# Patient Record
Sex: MALE | Race: WHITE | Employment: OTHER | URBAN - METROPOLITAN AREA
[De-identification: names, ages, dates, MRNs, and addresses within clinical notes are randomized per-mention and may not be internally consistent; named-entity substitution may affect disease eponyms.]

---

## 2018-02-15 ENCOUNTER — HOSPITAL ENCOUNTER (OUTPATIENT)
Age: 68
Discharge: HOME | End: 2018-02-15
Payer: COMMERCIAL

## 2018-02-15 LAB
ALT SERPL-CCNC: 25 U/L (ref 5–41)
AST SERPL-CCNC: 31 U/L
CHOLESTEROL, FASTING: 146 MG/DL
CHOLESTEROL/HDL RATIO: 3.2
HDLC SERPL-MCNC: 46 MG/DL
LDL CHOLESTEROL: 70 MG/DL (ref 0–130)
PROSTATE SPECIFIC ANTIGEN: 1.86 UG/L
TRIGLYCERIDE, FASTING: 150 MG/DL
VLDLC SERPL CALC-MCNC: ABNORMAL MG/DL (ref 1–30)

## 2018-02-15 PROCEDURE — 84450 TRANSFERASE (AST) (SGOT): CPT

## 2018-02-15 PROCEDURE — 84460 ALANINE AMINO (ALT) (SGPT): CPT

## 2018-02-15 PROCEDURE — 84153 ASSAY OF PSA TOTAL: CPT

## 2018-02-15 PROCEDURE — 80061 LIPID PANEL: CPT

## 2018-02-15 PROCEDURE — 36415 COLL VENOUS BLD VENIPUNCTURE: CPT

## 2018-11-12 ENCOUNTER — OFFICE VISIT (OUTPATIENT)
Dept: PRIMARY CARE CLINIC | Age: 68
End: 2018-11-12
Payer: COMMERCIAL

## 2018-11-12 VITALS
BODY MASS INDEX: 38.8 KG/M2 | SYSTOLIC BLOOD PRESSURE: 130 MMHG | RESPIRATION RATE: 20 BRPM | DIASTOLIC BLOOD PRESSURE: 70 MMHG | HEIGHT: 70 IN | HEART RATE: 74 BPM | WEIGHT: 271 LBS | OXYGEN SATURATION: 97 %

## 2018-11-12 DIAGNOSIS — M25.512 CHRONIC LEFT SHOULDER PAIN: Primary | ICD-10-CM

## 2018-11-12 DIAGNOSIS — G89.29 CHRONIC LEFT SHOULDER PAIN: Primary | ICD-10-CM

## 2018-11-12 DIAGNOSIS — I10 ESSENTIAL HYPERTENSION: ICD-10-CM

## 2018-11-12 PROCEDURE — 99213 OFFICE O/P EST LOW 20 MIN: CPT | Performed by: FAMILY MEDICINE

## 2018-11-12 RX ORDER — METHYLPREDNISOLONE 4 MG/1
TABLET ORAL
Qty: 1 KIT | Refills: 0 | Status: SHIPPED | OUTPATIENT
Start: 2018-11-12 | End: 2018-11-18

## 2018-11-12 ASSESSMENT — ENCOUNTER SYMPTOMS
VOMITING: 0
SHORTNESS OF BREATH: 0
CHEST TIGHTNESS: 0
NAUSEA: 0

## 2018-11-12 ASSESSMENT — PATIENT HEALTH QUESTIONNAIRE - PHQ9
SUM OF ALL RESPONSES TO PHQ9 QUESTIONS 1 & 2: 0
2. FEELING DOWN, DEPRESSED OR HOPELESS: 0
SUM OF ALL RESPONSES TO PHQ QUESTIONS 1-9: 0
1. LITTLE INTEREST OR PLEASURE IN DOING THINGS: 0
SUM OF ALL RESPONSES TO PHQ QUESTIONS 1-9: 0

## 2018-11-12 NOTE — PATIENT INSTRUCTIONS
[employer] allows you to send messages to your doctor, view your test results, renew your prescriptions, schedule appointments, view visit notes, and more. How Do I Sign Up? 1. In your Internet browser, go to [email or web address]  2. Click on the Sign Up Now link in the Sign In box. You will see the New Member Sign Up page. 3. Enter your [employer] Access Code exactly as it appears below. You will not need to use this code after youve completed the sign-up process. If you do not sign up before the expiration date, you must request a new code. [employer] Access Code: [email or web address]  Expires: 12/27/2018 10:03 AM    4. Enter your Social Security Number ([id number]) and Date of Birth (mm/dd/yyyy) as indicated and click Submit. You will be taken to the next sign-up page. 5. Create a [employer] ID. This will be your [employer] login ID and cannot be changed, so think of one that is secure and easy to remember. 6. Create a [employer] password. You can change your password at any time. 7. Enter your Password Reset Question and Answer. This can be used at a later time if you forget your password. 8. Enter your e-mail address. You will receive e-mail notification when new information is available in [address] 9. Click Sign Up. You can now view your medical record. Additional Information  If you have questions, please contact the physician practice where you receive care. Remember, [employer] is NOT to be used for urgent needs. For medical emergencies, dial 911. For questions regarding your [employer] account call [phone]. If you have a clinical question, please call your doctor's office.

## 2018-11-12 NOTE — PROGRESS NOTES
and trapezius, some numbness in forearm that is intermittent. Objective:     Physical Exam   Constitutional: He is oriented to person, place, and time. He appears well-developed and well-nourished. HENT:   Head: Normocephalic and atraumatic. Mouth/Throat: No oropharyngeal exudate. Eyes: EOM are normal.   Neck: Normal range of motion. Neck supple. With extension of neck gets some pain in trapezius shoulder region. Cardiovascular: Normal rate, regular rhythm and normal heart sounds. Pulmonary/Chest: Effort normal and breath sounds normal. No respiratory distress. He has no wheezes. Musculoskeletal:   Normal ROM of shoulder, some discomfort with flexion of shoulder. No ttp of shoulder. Negative tinel and phalens sign of left hand/arm,   Neurological: He is alert and oriented to person, place, and time. Skin: Skin is warm and dry. Psychiatric: He has a normal mood and affect. His behavior is normal.     /70   Pulse 74   Resp 20   Ht 5' 10\" (1.778 m)   Wt 271 lb (122.9 kg)   SpO2 97%   BMI 38.88 kg/m²     Assessment:      1. Chronic left shoulder pain  - hx of shoulder pain in past that improved with medrol dose faustino. No pain in neck but pain in shoulder area, but with neck extension sometime gets numbness in his left upper arm and thumb/index finger. Declines imaging at this time. Will try trial of medrol faustino as that has helped resolve it before. Ibuprofen for pain as well. Will check bmp.   - External Referral To Physical Therapy    2. Essential hypertension  - Basic Metabolic Panel;  Future           Plan:      Return in about 3 months (around 2/12/2019) for physical exam.    Orders Placed This Encounter   Procedures    Basic Metabolic Panel     Standing Status:   Future     Standing Expiration Date:   11/12/2019    External Referral To Physical Therapy     Referral Priority:   Routine     Referral Type:   Eval and Treat     Referral Reason:   Specialty Services Required Requested Specialty:   Physical Therapy     Number of Visits Requested:   1     Orders Placed This Encounter   Medications    methylPREDNISolone (MEDROL DOSEPACK) 4 MG tablet     Sig: Take by mouth.      Dispense:  1 kit     Refill:  0            Electronically signed by Sam Hayden DO on 11/12/2018 at 11:22 AM

## 2019-02-05 ENCOUNTER — HOSPITAL ENCOUNTER (OUTPATIENT)
Age: 69
Discharge: HOME | End: 2019-02-05
Payer: COMMERCIAL

## 2019-02-05 LAB
ALBUMIN SERPL-MCNC: 4.1 G/DL (ref 3.5–5.2)
ALBUMIN/GLOBULIN RATIO: 1.4 (ref 1–2.5)
ALP BLD-CCNC: 100 U/L (ref 40–129)
ALT SERPL-CCNC: 18 U/L (ref 5–41)
ANION GAP SERPL CALCULATED.3IONS-SCNC: 11 MMOL/L (ref 9–17)
AST SERPL-CCNC: 19 U/L
BILIRUB SERPL-MCNC: 0.53 MG/DL (ref 0.3–1.2)
BUN BLDV-MCNC: 16 MG/DL (ref 8–23)
BUN/CREAT BLD: ABNORMAL (ref 9–20)
CALCIUM SERPL-MCNC: 8.8 MG/DL (ref 8.6–10.4)
CHLORIDE BLD-SCNC: 104 MMOL/L (ref 98–107)
CHOLESTEROL, FASTING: 128 MG/DL
CHOLESTEROL/HDL RATIO: 3
CO2: 26 MMOL/L (ref 20–31)
CREAT SERPL-MCNC: 0.66 MG/DL (ref 0.7–1.2)
GFR AFRICAN AMERICAN: >60 ML/MIN
GFR NON-AFRICAN AMERICAN: >60 ML/MIN
GFR SERPL CREATININE-BSD FRML MDRD: ABNORMAL ML/MIN/{1.73_M2}
GFR SERPL CREATININE-BSD FRML MDRD: ABNORMAL ML/MIN/{1.73_M2}
GLUCOSE BLD-MCNC: 106 MG/DL (ref 70–99)
HDLC SERPL-MCNC: 42 MG/DL
LDL CHOLESTEROL: 65 MG/DL (ref 0–130)
POTASSIUM SERPL-SCNC: 3.7 MMOL/L (ref 3.7–5.3)
PROSTATE SPECIFIC ANTIGEN: 1.7 UG/L
SODIUM BLD-SCNC: 141 MMOL/L (ref 135–144)
TOTAL PROTEIN: 7.1 G/DL (ref 6.4–8.3)
TRIGLYCERIDE, FASTING: 107 MG/DL
VLDLC SERPL CALC-MCNC: NORMAL MG/DL (ref 1–30)

## 2019-02-05 PROCEDURE — 80053 COMPREHEN METABOLIC PANEL: CPT

## 2019-02-05 PROCEDURE — 84153 ASSAY OF PSA TOTAL: CPT

## 2019-02-05 PROCEDURE — 36415 COLL VENOUS BLD VENIPUNCTURE: CPT

## 2019-02-05 PROCEDURE — 80061 LIPID PANEL: CPT

## 2019-02-11 ENCOUNTER — OFFICE VISIT (OUTPATIENT)
Dept: PRIMARY CARE CLINIC | Age: 69
End: 2019-02-11
Payer: COMMERCIAL

## 2019-02-11 VITALS
OXYGEN SATURATION: 96 % | RESPIRATION RATE: 16 BRPM | DIASTOLIC BLOOD PRESSURE: 74 MMHG | WEIGHT: 261 LBS | SYSTOLIC BLOOD PRESSURE: 126 MMHG | BODY MASS INDEX: 37.45 KG/M2 | HEART RATE: 64 BPM

## 2019-02-11 DIAGNOSIS — Z12.11 ENCOUNTER FOR SCREENING COLONOSCOPY: ICD-10-CM

## 2019-02-11 DIAGNOSIS — Z13.6 ENCOUNTER FOR ABDOMINAL AORTIC ANEURYSM (AAA) SCREENING: ICD-10-CM

## 2019-02-11 DIAGNOSIS — Z00.00 HEALTH MAINTENANCE EXAMINATION: Primary | ICD-10-CM

## 2019-02-11 DIAGNOSIS — Z13.1 SCREENING FOR DIABETES MELLITUS: ICD-10-CM

## 2019-02-11 LAB — HBA1C MFR BLD: 5.4 %

## 2019-02-11 PROCEDURE — 83036 HEMOGLOBIN GLYCOSYLATED A1C: CPT | Performed by: FAMILY MEDICINE

## 2019-02-11 PROCEDURE — 99397 PER PM REEVAL EST PAT 65+ YR: CPT | Performed by: FAMILY MEDICINE

## 2019-02-11 ASSESSMENT — PATIENT HEALTH QUESTIONNAIRE - PHQ9
2. FEELING DOWN, DEPRESSED OR HOPELESS: 0
SUM OF ALL RESPONSES TO PHQ QUESTIONS 1-9: 0
1. LITTLE INTEREST OR PLEASURE IN DOING THINGS: 0
SUM OF ALL RESPONSES TO PHQ QUESTIONS 1-9: 0
SUM OF ALL RESPONSES TO PHQ9 QUESTIONS 1 & 2: 0

## 2019-02-11 ASSESSMENT — ENCOUNTER SYMPTOMS
NAUSEA: 0
SHORTNESS OF BREATH: 0
BLOOD IN STOOL: 0
ABDOMINAL PAIN: 0
SORE THROAT: 0
VOMITING: 0
CONSTIPATION: 0
DIARRHEA: 0
COUGH: 0

## 2019-02-14 ENCOUNTER — TELEPHONE (OUTPATIENT)
Dept: PRIMARY CARE CLINIC | Age: 69
End: 2019-02-14

## 2019-03-07 ENCOUNTER — HOSPITAL ENCOUNTER (OUTPATIENT)
Dept: VASCULAR LAB | Age: 69
Discharge: HOME | End: 2019-03-07
Payer: COMMERCIAL

## 2019-03-07 PROCEDURE — 76706 US ABDL AORTA SCREEN AAA: CPT

## 2019-03-12 ENCOUNTER — ANESTHESIA (OUTPATIENT)
Dept: ENDOSCOPY | Age: 69
End: 2019-03-12
Payer: COMMERCIAL

## 2019-03-12 ENCOUNTER — ANESTHESIA EVENT (OUTPATIENT)
Dept: ENDOSCOPY | Age: 69
End: 2019-03-12
Payer: COMMERCIAL

## 2019-03-12 ENCOUNTER — HOSPITAL ENCOUNTER (OUTPATIENT)
Age: 69
Setting detail: OUTPATIENT SURGERY
Discharge: HOME | End: 2019-03-12
Attending: INTERNAL MEDICINE | Admitting: INTERNAL MEDICINE
Payer: COMMERCIAL

## 2019-03-12 VITALS
TEMPERATURE: 98.1 F | BODY MASS INDEX: 37.22 KG/M2 | OXYGEN SATURATION: 95 % | WEIGHT: 260 LBS | SYSTOLIC BLOOD PRESSURE: 111 MMHG | HEART RATE: 60 BPM | HEIGHT: 70 IN | DIASTOLIC BLOOD PRESSURE: 64 MMHG | RESPIRATION RATE: 14 BRPM

## 2019-03-12 VITALS
OXYGEN SATURATION: 96 % | DIASTOLIC BLOOD PRESSURE: 103 MMHG | RESPIRATION RATE: 17 BRPM | SYSTOLIC BLOOD PRESSURE: 129 MMHG

## 2019-03-12 PROCEDURE — 2720000010 HC SURG SUPPLY STERILE: Performed by: INTERNAL MEDICINE

## 2019-03-12 PROCEDURE — 3609009900 HC COLONOSCOPY W/CONTROL BLEEDING ANY METHOD: Performed by: INTERNAL MEDICINE

## 2019-03-12 PROCEDURE — 2580000003 HC RX 258: Performed by: NURSE ANESTHETIST, CERTIFIED REGISTERED

## 2019-03-12 PROCEDURE — 7100000011 HC PHASE II RECOVERY - ADDTL 15 MIN: Performed by: INTERNAL MEDICINE

## 2019-03-12 PROCEDURE — 7100000010 HC PHASE II RECOVERY - FIRST 15 MIN: Performed by: INTERNAL MEDICINE

## 2019-03-12 PROCEDURE — 2500000003 HC RX 250 WO HCPCS: Performed by: NURSE ANESTHETIST, CERTIFIED REGISTERED

## 2019-03-12 PROCEDURE — 88305 TISSUE EXAM BY PATHOLOGIST: CPT

## 2019-03-12 PROCEDURE — 3700000000 HC ANESTHESIA ATTENDED CARE: Performed by: INTERNAL MEDICINE

## 2019-03-12 PROCEDURE — 3609010400 HC COLONOSCOPY POLYPECTOMY HOT BIOPSY: Performed by: INTERNAL MEDICINE

## 2019-03-12 PROCEDURE — 76937 US GUIDE VASCULAR ACCESS: CPT

## 2019-03-12 PROCEDURE — 6360000002 HC RX W HCPCS: Performed by: NURSE ANESTHETIST, CERTIFIED REGISTERED

## 2019-03-12 PROCEDURE — 2580000003 HC RX 258: Performed by: INTERNAL MEDICINE

## 2019-03-12 PROCEDURE — 2709999900 HC NON-CHARGEABLE SUPPLY: Performed by: INTERNAL MEDICINE

## 2019-03-12 PROCEDURE — 3700000001 HC ADD 15 MINUTES (ANESTHESIA): Performed by: INTERNAL MEDICINE

## 2019-03-12 PROCEDURE — C1773 RET DEV, INSERTABLE: HCPCS | Performed by: INTERNAL MEDICINE

## 2019-03-12 RX ORDER — SODIUM CHLORIDE 9 MG/ML
INJECTION, SOLUTION INTRAVENOUS CONTINUOUS PRN
Status: DISCONTINUED | OUTPATIENT
Start: 2019-03-12 | End: 2019-03-12 | Stop reason: SDUPTHER

## 2019-03-12 RX ORDER — PROPOFOL 10 MG/ML
INJECTION, EMULSION INTRAVENOUS PRN
Status: DISCONTINUED | OUTPATIENT
Start: 2019-03-12 | End: 2019-03-12 | Stop reason: SDUPTHER

## 2019-03-12 RX ORDER — PROPOFOL 10 MG/ML
INJECTION, EMULSION INTRAVENOUS CONTINUOUS PRN
Status: DISCONTINUED | OUTPATIENT
Start: 2019-03-12 | End: 2019-03-12 | Stop reason: SDUPTHER

## 2019-03-12 RX ORDER — LIDOCAINE HYDROCHLORIDE 10 MG/ML
INJECTION, SOLUTION EPIDURAL; INFILTRATION; INTRACAUDAL; PERINEURAL PRN
Status: DISCONTINUED | OUTPATIENT
Start: 2019-03-12 | End: 2019-03-12 | Stop reason: SDUPTHER

## 2019-03-12 RX ORDER — SODIUM CHLORIDE 9 MG/ML
INJECTION, SOLUTION INTRAVENOUS CONTINUOUS
Status: DISCONTINUED | OUTPATIENT
Start: 2019-03-12 | End: 2019-03-12 | Stop reason: HOSPADM

## 2019-03-12 RX ADMIN — SODIUM CHLORIDE: 9 INJECTION, SOLUTION INTRAVENOUS at 08:49

## 2019-03-12 RX ADMIN — PROPOFOL 10 MG: 10 INJECTION, EMULSION INTRAVENOUS at 10:17

## 2019-03-12 RX ADMIN — PROPOFOL 150 MCG/KG/MIN: 10 INJECTION, EMULSION INTRAVENOUS at 09:45

## 2019-03-12 RX ADMIN — PROPOFOL 50 MG: 10 INJECTION, EMULSION INTRAVENOUS at 09:45

## 2019-03-12 RX ADMIN — LIDOCAINE HYDROCHLORIDE 50 MG: 10 INJECTION, SOLUTION EPIDURAL; INFILTRATION; INTRACAUDAL at 09:45

## 2019-03-12 RX ADMIN — PROPOFOL 40 MG: 10 INJECTION, EMULSION INTRAVENOUS at 09:50

## 2019-03-12 RX ADMIN — SODIUM CHLORIDE: 9 INJECTION, SOLUTION INTRAVENOUS at 09:42

## 2019-03-12 ASSESSMENT — PAIN SCALES - GENERAL
PAINLEVEL_OUTOF10: 0
PAINLEVEL_OUTOF10: 0

## 2019-03-12 ASSESSMENT — PAIN - FUNCTIONAL ASSESSMENT: PAIN_FUNCTIONAL_ASSESSMENT: 0-10

## 2019-03-12 ASSESSMENT — LIFESTYLE VARIABLES: SMOKING_STATUS: 0

## 2019-03-13 LAB — SURGICAL PATHOLOGY REPORT: NORMAL

## 2019-09-20 ENCOUNTER — OFFICE VISIT (OUTPATIENT)
Dept: PRIMARY CARE CLINIC | Age: 69
End: 2019-09-20
Payer: COMMERCIAL

## 2019-09-20 VITALS
BODY MASS INDEX: 39.22 KG/M2 | HEIGHT: 69 IN | HEART RATE: 56 BPM | WEIGHT: 264.8 LBS | DIASTOLIC BLOOD PRESSURE: 68 MMHG | SYSTOLIC BLOOD PRESSURE: 138 MMHG | RESPIRATION RATE: 18 BRPM

## 2019-09-20 DIAGNOSIS — Z00.00 ROUTINE GENERAL MEDICAL EXAMINATION AT A HEALTH CARE FACILITY: Primary | ICD-10-CM

## 2019-09-20 PROCEDURE — 3017F COLORECTAL CA SCREEN DOC REV: CPT | Performed by: FAMILY MEDICINE

## 2019-09-20 PROCEDURE — G0439 PPPS, SUBSEQ VISIT: HCPCS | Performed by: FAMILY MEDICINE

## 2019-09-20 PROCEDURE — 4040F PNEUMOC VAC/ADMIN/RCVD: CPT | Performed by: FAMILY MEDICINE

## 2019-09-20 PROCEDURE — 1123F ACP DISCUSS/DSCN MKR DOCD: CPT | Performed by: FAMILY MEDICINE

## 2019-09-20 ASSESSMENT — LIFESTYLE VARIABLES
HOW OFTEN DURING THE LAST YEAR HAVE YOU BEEN UNABLE TO REMEMBER WHAT HAPPENED THE NIGHT BEFORE BECAUSE YOU HAD BEEN DRINKING: 0
HOW OFTEN DURING THE LAST YEAR HAVE YOU HAD A FEELING OF GUILT OR REMORSE AFTER DRINKING: 0
HOW OFTEN DURING THE LAST YEAR HAVE YOU NEEDED AN ALCOHOLIC DRINK FIRST THING IN THE MORNING TO GET YOURSELF GOING AFTER A NIGHT OF HEAVY DRINKING: 0
HOW MANY STANDARD DRINKS CONTAINING ALCOHOL DO YOU HAVE ON A TYPICAL DAY: 1
AUDIT-C TOTAL SCORE: 5
AUDIT TOTAL SCORE: 5
HOW OFTEN DO YOU HAVE SIX OR MORE DRINKS ON ONE OCCASION: 0
HOW OFTEN DO YOU HAVE A DRINK CONTAINING ALCOHOL: 4
HOW OFTEN DURING THE LAST YEAR HAVE YOU FOUND THAT YOU WERE NOT ABLE TO STOP DRINKING ONCE YOU HAD STARTED: 0
HAVE YOU OR SOMEONE ELSE BEEN INJURED AS A RESULT OF YOUR DRINKING: 0
HOW OFTEN DURING THE LAST YEAR HAVE YOU FAILED TO DO WHAT WAS NORMALLY EXPECTED FROM YOU BECAUSE OF DRINKING: 0
HAS A RELATIVE, FRIEND, DOCTOR, OR ANOTHER HEALTH PROFESSIONAL EXPRESSED CONCERN ABOUT YOUR DRINKING OR SUGGESTED YOU CUT DOWN: 0

## 2019-09-20 ASSESSMENT — PATIENT HEALTH QUESTIONNAIRE - PHQ9
SUM OF ALL RESPONSES TO PHQ QUESTIONS 1-9: 0
SUM OF ALL RESPONSES TO PHQ QUESTIONS 1-9: 0

## 2019-09-20 NOTE — PROGRESS NOTES
performed by Yesenia Willard MD at [facility] Endoscopy    TOTAL KNEE ARTHROPLASTY Bilateral      Family History   Problem Relation Age of Onset    Heart Disease Father     Heart Disease Sister     Heart Disease Brother     Heart Disease Sister     Heart Disease Sister        CareTeam (Including outside providers/suppliers regularly involved in providing care):   Patient Care Team:  Abeba Crews DO as PCP - General (Family Medicine)  Abeba Crews DO as PCP - [facility] Empaneled Provider    Wt Readings from Last 3 Encounters:   09/20/19 264 lb 12.8 oz (120.1 kg)   03/12/19 260 lb (117.9 kg)   02/11/19 261 lb (118.4 kg)     Vitals:    09/20/19 1046   Resp: 18   Weight: 264 lb 12.8 oz (120.1 kg)   Height: 5' 8.5\" (1.74 m)     Body mass index is 39.68 kg/m². Based upon direct observation of the patient, evaluation of cognition reveals recent and remote memory intact. Patient's complete Health Risk Assessment and screening values have been reviewed and are found in Flowsheets. The following problems were reviewed today and where indicated follow up appointments were made and/or referrals ordered. Positive Risk Factor Screenings with Interventions:     Health Habits/Nutrition:  Health Habits/Nutrition  Do you exercise for at least 20 minutes 2-3 times per week?: (!) No  Have you lost any weight without trying in the past 3 months?: No  Do you eat fewer than 2 meals per day?: No  Have you seen a dentist within the past year?: Yes  Body mass index is 39.68 kg/m².   Health Habits/Nutrition Interventions:  · plays golf, walks dog, knows he should exercise more    Hearing/Vision:  No exam data present  Hearing/Vision  Do you or your family notice any trouble with your hearing?: No  Do you have difficulty driving, watching TV, or doing any of your daily activities because of your eyesight?: No  Have you had an eye exam within the past year?: (!) No  Hearing/Vision Interventions:  · has an eye doctor, counseled

## 2020-02-03 ENCOUNTER — OFFICE VISIT (OUTPATIENT)
Dept: PRIMARY CARE CLINIC | Age: 70
End: 2020-02-03
Payer: COMMERCIAL

## 2020-02-03 VITALS
DIASTOLIC BLOOD PRESSURE: 74 MMHG | WEIGHT: 267 LBS | SYSTOLIC BLOOD PRESSURE: 132 MMHG | BODY MASS INDEX: 39.55 KG/M2 | RESPIRATION RATE: 18 BRPM | OXYGEN SATURATION: 95 % | HEART RATE: 73 BPM | HEIGHT: 69 IN

## 2020-02-03 PROCEDURE — G8427 DOCREV CUR MEDS BY ELIG CLIN: HCPCS | Performed by: FAMILY MEDICINE

## 2020-02-03 PROCEDURE — 1123F ACP DISCUSS/DSCN MKR DOCD: CPT | Performed by: FAMILY MEDICINE

## 2020-02-03 PROCEDURE — G8417 CALC BMI ABV UP PARAM F/U: HCPCS | Performed by: FAMILY MEDICINE

## 2020-02-03 PROCEDURE — 99214 OFFICE O/P EST MOD 30 MIN: CPT | Performed by: FAMILY MEDICINE

## 2020-02-03 PROCEDURE — 4040F PNEUMOC VAC/ADMIN/RCVD: CPT | Performed by: FAMILY MEDICINE

## 2020-02-03 PROCEDURE — 1036F TOBACCO NON-USER: CPT | Performed by: FAMILY MEDICINE

## 2020-02-03 PROCEDURE — G8484 FLU IMMUNIZE NO ADMIN: HCPCS | Performed by: FAMILY MEDICINE

## 2020-02-03 PROCEDURE — 3017F COLORECTAL CA SCREEN DOC REV: CPT | Performed by: FAMILY MEDICINE

## 2020-02-03 ASSESSMENT — PATIENT HEALTH QUESTIONNAIRE - PHQ9
SUM OF ALL RESPONSES TO PHQ9 QUESTIONS 1 & 2: 0
1. LITTLE INTEREST OR PLEASURE IN DOING THINGS: 0
2. FEELING DOWN, DEPRESSED OR HOPELESS: 0
SUM OF ALL RESPONSES TO PHQ QUESTIONS 1-9: 0
SUM OF ALL RESPONSES TO PHQ QUESTIONS 1-9: 0

## 2020-02-03 ASSESSMENT — ENCOUNTER SYMPTOMS
DIARRHEA: 0
RHINORRHEA: 0
ABDOMINAL PAIN: 0
VOMITING: 0
SORE THROAT: 0
CONSTIPATION: 0
SHORTNESS OF BREATH: 0
NAUSEA: 0
CHEST TIGHTNESS: 0

## 2020-02-03 NOTE — PROGRESS NOTES
[facility] PRIMARY CARE  [facility] [address]  [address] [address]  Dept: [phone]  Dept Fax: [phone]    Taras Cagle is a 81 y.o. male who presents today for his medical conditions/complaints as noted below. Taras Montoyasin is c/o of  Chief Complaint   Patient presents with    Annual Exam         HPI:     HPI     Pt here for annual exam for chronic issues    HTN: well controlled on current medications    Hyperlipidemia and hx o cabg years ago and stent placement: follows with Dr. Jeaneth Prince urologist tyson- brother had prostate cancer, pt on flomax    Had colonoscopy last year. Sees dentist regularly.      Hemoglobin A1C (%)   Date Value   02/11/2019 5.4             ( goal A1C is < 7)   No results found for: LABMICR  LDL Cholesterol (mg/dL)   Date Value   02/05/2019 65   02/15/2018 70   09/26/2016 74       (goal LDL is <100)   AST (U/L)   Date Value   02/05/2019 19     ALT (U/L)   Date Value   02/05/2019 18     BUN (mg/dL)   Date Value   02/05/2019 16     BP Readings from Last 3 Encounters:   02/03/20 132/74   09/20/19 138/68   03/12/19 111/64          (goal 120/80)    Past Medical History:   Diagnosis Date    Coronary artery disease (CAD) excluded     Hyperlipidemia     Hypertension       Past Surgical History:   Procedure Laterality Date    CARDIAC SURGERY      bypass    CATARACT REMOVAL      COLONOSCOPY  3/12/2019    COLONOSCOPY CONTROL HEMORRHAGE performed by [facility]naldo [facility]ek MD at [facility]    COLONOSCOPY  3/12/2019    COLONOSCOPY POLYPECTOMY HOT BIOPSY performed by Arnaldo Cheek MD at [facility]    TOTAL KNEE ARTHROPLASTY Bilateral        Family History   Problem Relation Age of Onset    Heart Disease Father     Heart Disease Sister     Heart Disease Brother     Heart Disease Sister     Heart Disease Sister        Social History     Tobacco Use    Smoking status: Former Smoker     Packs/day: 0.50     Years: dysuria. Objective:     Physical Exam  Constitutional:       General: He is not in acute distress. Appearance: He is well-developed. He is not diaphoretic. HENT:      Head: Normocephalic and atraumatic. Mouth/Throat:      Mouth: Mucous membranes are moist.      Pharynx: Oropharynx is clear. No oropharyngeal exudate. Eyes:      Pupils: Pupils are equal, round, and reactive to light. Neck:      Musculoskeletal: Neck supple. Cardiovascular:      Rate and Rhythm: Normal rate and regular rhythm. Heart sounds: Normal heart sounds. No murmur. Pulmonary:      Effort: Pulmonary effort is normal. No respiratory distress. Breath sounds: Normal breath sounds. No stridor. Abdominal:      General: Bowel sounds are normal. There is no distension. Palpations: Abdomen is soft. Tenderness: There is no abdominal tenderness. Skin:     General: Skin is warm and dry. Neurological:      Mental Status: He is alert and oriented to person, place, and time. Psychiatric:         Behavior: Behavior normal.       /74 (Site: Left Upper Arm, Position: Sitting, Cuff Size: Large Adult)   Pulse 73   Resp 18   Ht 5' 8.5\" (1.74 m)   Wt 267 lb (121.1 kg)   SpO2 95%   BMI 40.00 kg/m²     Assessment:      1. Hyperlipidemia, unspecified hyperlipidemia type  - continue current medication, following with Dr. Cholo Navarro. 2. Essential hypertension  - controlled, continue current medications    3. Hx of CABG  - continue current medications    4. Benign prostatic hyperplasia, unspecified whether lower urinary tract symptoms present  - on flomax, follows with urology    Recommended shingles vaccine and pneumonia vaccine, and healthy diet/exercise           Plan:      Return in about 1 year (around 2/3/2021). No orders of the defined types were placed in this encounter. No orders of the defined types were placed in this encounter.        Patient given educational materials - see patient

## 2020-10-16 ENCOUNTER — HOSPITAL ENCOUNTER (OUTPATIENT)
Dept: PREADMISSION TESTING | Age: 70
Setting detail: SPECIMEN
Discharge: HOME | End: 2020-10-20
Payer: MEDICARE

## 2020-10-16 LAB
SARS-COV-2, RAPID: NORMAL
SARS-COV-2: NORMAL
SARS-COV-2: NOT DETECTED
SOURCE: NORMAL

## 2020-10-16 PROCEDURE — U0003 INFECTIOUS AGENT DETECTION BY NUCLEIC ACID (DNA OR RNA); SEVERE ACUTE RESPIRATORY SYNDROME CORONAVIRUS 2 (SARS-COV-2) (CORONAVIRUS DISEASE [COVID-19]), AMPLIFIED PROBE TECHNIQUE, MAKING USE OF HIGH THROUGHPUT TECHNOLOGIES AS DESCRIBED BY CMS-2020-01-R: HCPCS

## 2020-10-17 ENCOUNTER — TELEPHONE (OUTPATIENT)
Dept: PRIMARY CARE CLINIC | Age: 70
End: 2020-10-17

## 2020-10-20 ENCOUNTER — ANESTHESIA (OUTPATIENT)
Dept: ENDOSCOPY | Age: 70
End: 2020-10-20
Payer: MEDICARE

## 2020-10-20 ENCOUNTER — ANESTHESIA EVENT (OUTPATIENT)
Dept: ENDOSCOPY | Age: 70
End: 2020-10-20
Payer: MEDICARE

## 2020-10-20 ENCOUNTER — HOSPITAL ENCOUNTER (OUTPATIENT)
Age: 70
Setting detail: OUTPATIENT SURGERY
Discharge: HOME | End: 2020-10-20
Attending: INTERNAL MEDICINE | Admitting: INTERNAL MEDICINE
Payer: MEDICARE

## 2020-10-20 VITALS
DIASTOLIC BLOOD PRESSURE: 85 MMHG | HEIGHT: 70 IN | WEIGHT: 260 LBS | TEMPERATURE: 97.4 F | HEART RATE: 69 BPM | RESPIRATION RATE: 24 BRPM | OXYGEN SATURATION: 93 % | SYSTOLIC BLOOD PRESSURE: 146 MMHG | BODY MASS INDEX: 37.22 KG/M2

## 2020-10-20 VITALS
DIASTOLIC BLOOD PRESSURE: 71 MMHG | OXYGEN SATURATION: 92 % | SYSTOLIC BLOOD PRESSURE: 117 MMHG | RESPIRATION RATE: 36 BRPM

## 2020-10-20 PROCEDURE — 3609010300 HC COLONOSCOPY W/BIOPSY SINGLE/MULTIPLE: Performed by: INTERNAL MEDICINE

## 2020-10-20 PROCEDURE — 3700000001 HC ADD 15 MINUTES (ANESTHESIA): Performed by: INTERNAL MEDICINE

## 2020-10-20 PROCEDURE — 7100000011 HC PHASE II RECOVERY - ADDTL 15 MIN: Performed by: INTERNAL MEDICINE

## 2020-10-20 PROCEDURE — 2580000003 HC RX 258: Performed by: INTERNAL MEDICINE

## 2020-10-20 PROCEDURE — 6360000002 HC RX W HCPCS: Performed by: NURSE ANESTHETIST, CERTIFIED REGISTERED

## 2020-10-20 PROCEDURE — 88305 TISSUE EXAM BY PATHOLOGIST: CPT

## 2020-10-20 PROCEDURE — 2709999900 HC NON-CHARGEABLE SUPPLY: Performed by: INTERNAL MEDICINE

## 2020-10-20 PROCEDURE — 7100000010 HC PHASE II RECOVERY - FIRST 15 MIN: Performed by: INTERNAL MEDICINE

## 2020-10-20 PROCEDURE — 3700000000 HC ANESTHESIA ATTENDED CARE: Performed by: INTERNAL MEDICINE

## 2020-10-20 PROCEDURE — 3609010600 HC COLONOSCOPY POLYPECTOMY SNARE/COLD BIOPSY: Performed by: INTERNAL MEDICINE

## 2020-10-20 RX ORDER — PROPOFOL 10 MG/ML
INJECTION, EMULSION INTRAVENOUS CONTINUOUS PRN
Status: DISCONTINUED | OUTPATIENT
Start: 2020-10-20 | End: 2020-10-20 | Stop reason: SDUPTHER

## 2020-10-20 RX ORDER — PROPOFOL 10 MG/ML
INJECTION, EMULSION INTRAVENOUS PRN
Status: DISCONTINUED | OUTPATIENT
Start: 2020-10-20 | End: 2020-10-20 | Stop reason: SDUPTHER

## 2020-10-20 RX ORDER — SODIUM CHLORIDE 9 MG/ML
INJECTION, SOLUTION INTRAVENOUS CONTINUOUS
Status: DISCONTINUED | OUTPATIENT
Start: 2020-10-20 | End: 2020-10-20 | Stop reason: HOSPADM

## 2020-10-20 RX ADMIN — PROPOFOL 50 MG: 10 INJECTION, EMULSION INTRAVENOUS at 09:06

## 2020-10-20 RX ADMIN — PROPOFOL 125 MCG/KG/MIN: 10 INJECTION, EMULSION INTRAVENOUS at 09:06

## 2020-10-20 RX ADMIN — SODIUM CHLORIDE: 9 INJECTION, SOLUTION INTRAVENOUS at 07:44

## 2020-10-20 ASSESSMENT — LIFESTYLE VARIABLES: SMOKING_STATUS: 1

## 2020-10-20 ASSESSMENT — ENCOUNTER SYMPTOMS
SHORTNESS OF BREATH: 0
STRIDOR: 0

## 2020-10-20 ASSESSMENT — PAIN SCALES - GENERAL
PAINLEVEL_OUTOF10: 0
PAINLEVEL_OUTOF10: 0

## 2020-10-20 ASSESSMENT — PAIN - FUNCTIONAL ASSESSMENT: PAIN_FUNCTIONAL_ASSESSMENT: 0-10

## 2020-10-20 NOTE — ANESTHESIA PRE PROCEDURE
Department of Anesthesiology  Preprocedure Note       Name:  Beka Dugan   Age:  78 y.o.  :  1950                                          MRN:  [id number]         Date:  10/20/2020      Surgeon: Rochelle Sanchez):  Chong Armando MD    Procedure: Procedure(s):  COLONOSCOPY DIAGNOSTIC    Medications prior to admission:   Prior to Admission medications    Medication Sig Start Date End Date Taking? Authorizing Provider   clopidogrel (PLAVIX) 75 MG tablet Take 1 tablet by mouth daily 5/19/15  Yes Serge Pepe MD   metoprolol (TOPROL-XL) 25 MG XL tablet Take 25 mg by mouth. 1/2 tab daily   Yes Historical Provider, MD   isosorbide mononitrate (IMDUR) 30 MG CR tablet Take 30 mg by mouth daily. Yes Historical Provider, MD   lisinopril (PRINIVIL;ZESTRIL) 10 MG tablet Take 10 mg by mouth daily. Yes Historical Provider, MD   amLODIPine (NORVASC) 10 MG tablet Take 10 mg by mouth daily. Yes Historical Provider, MD   simvastatin (ZOCOR) 40 MG tablet Take 40 mg by mouth nightly. Yes Historical Provider, MD   aspirin 81 MG tablet Take 81 mg by mouth daily    Yes Historical Provider, MD   tamsulosin (FLOMAX) 0.4 MG capsule Take 0.4 mg by mouth daily.    Yes Historical Provider, MD   nitroGLYCERIN (NITROSTAT) 0.4 MG SL tablet Place 1 tablet under the tongue every 5 minutes as needed for Chest pain 5/19/15   Serge Pepe MD       Current medications:    Current Facility-Administered Medications   Medication Dose Route Frequency Provider Last Rate Last Dose    0.9 % sodium chloride infusion   Intravenous Continuous Chong Armando  mL/hr at 10/20/20 0744         Allergies:  No Known Allergies    Problem List:    Patient Active Problem List   Diagnosis Code    S/P CABG x 4  Z95.1    S/P coronary artery stent placement-Native LCX  / Dr. Arlin Arceo Z95.5    Obesity E66.9    HTN (hypertension) I10    Hyperlipidemia E78.5    S/P LAURA - OM1 5/18/15-Dr. Arlin Arceo Z95.5       Past Medical History: GI/Hepatic/Renal ROS            Endo/Other: Negative Endo/Other ROS                    Abdominal:   (+) obese,         Vascular: negative vascular ROS. Anesthesia Plan      general and MAC     ASA 3       Induction: intravenous. Anesthetic plan and risks discussed with patient. Use of blood products discussed with patient whom consented to blood products. Plan discussed with CRNA.     Attending anesthesiologist reviewed and agrees with 2015 ORAL Pinzon - CRNA   10/20/2020

## 2020-10-20 NOTE — H&P
History and Physical    Pt Name: Dali Alves  MRN: [id number]  YOB: 1950  Date of evaluation: 10/20/2020  Primary Care Physician: Elda Patel DO    SUBJECTIVE:   History of Chief Complaint:    Dali Alves is a 78 y.o. male who is scheduled today for colonoscopy. He reports most recent colonoscopy was last year, and that he \"had a lot of polyps\". He presents for follow-up on this. He denies any acute GI complaints. He reports having stopped his Plavix (hx CAD, CABG,stents) one week ago in prep for today's procedure. Allergies  has No Known Allergies. Medications  Prior to Admission medications    Medication Sig Start Date End Date Taking? Authorizing Provider   clopidogrel (PLAVIX) 75 MG tablet Take 1 tablet by mouth daily 5/19/15  Yes Marcela Patton MD   metoprolol (TOPROL-XL) 25 MG XL tablet Take 25 mg by mouth. 1/2 tab daily   Yes Historical Provider, MD   isosorbide mononitrate (IMDUR) 30 MG CR tablet Take 30 mg by mouth daily. Yes Historical Provider, MD   lisinopril (PRINIVIL;ZESTRIL) 10 MG tablet Take 10 mg by mouth daily. Yes Historical Provider, MD   amLODIPine (NORVASC) 10 MG tablet Take 10 mg by mouth daily. Yes Historical Provider, MD   simvastatin (ZOCOR) 40 MG tablet Take 40 mg by mouth nightly. Yes Historical Provider, MD   aspirin 81 MG tablet Take 81 mg by mouth daily    Yes Historical Provider, MD   tamsulosin (FLOMAX) 0.4 MG capsule Take 0.4 mg by mouth daily. Yes Historical Provider, MD   nitroGLYCERIN (NITROSTAT) 0.4 MG SL tablet Place 1 tablet under the tongue every 5 minutes as needed for Chest pain 5/19/15   Marcela Patton MD     Past Medical History    has a past medical history of Arthritis, Coronary artery disease (CAD) excluded, Enlarged prostate, Hyperlipidemia, Hypertension, Obesity, and Wears partial dentures. Past Surgical History   has a past surgical history that includes Total knee arthroplasty (Bilateral);  Cataract removal; Colonoscopy extremities. VASCULAR:  Brisk cap refill bilateral fingers. Radial pulses are intact, 2+ bilaterally. Dorsalis pedis pulse 2+ bilaterally. 1+ pitting edema LLE, trace edema RLE, (states this is all usual for him)  NEUROLOGIC: CN II-XII are grossly intact. Gait not assessed. IMPRESSIONS:   History of colon polyps       Diagnosis Date    Arthritis     Coronary artery disease (CAD) excluded     CABG x 4 in 1993, 5 stents after between 2000 and 2005    Enlarged prostate     Hyperlipidemia     Hypertension     Obesity     Wears partial dentures     upper   1.    PLANS:   Colonoscopy     ANAIS GASTELUM  Electronically signed 10/20/2020 at 7:58 AM

## 2020-10-20 NOTE — OP NOTE
[facility]        COLONOSCOPY    Patient:   Megan Price    :    1950    Referring/PCP: Hong Dickens DO  Facility:  [facility]  Procedure:   Colonoscopy with biopsy, polypectomy (cold snare)  Date:     10/20/2020  Endoscopist:  Kenia Sanchez MD    Indication:  previous adenomatous polyp. Anesthesia: MAC    Complications:  None      Estimated blood loss: Minimal    Specimen collected: Yes    Description of Procedure:  Informed consent was obtained from the patient after explanation of the procedure including indications, description of the procedure,  benefits and possible risks and complications of the procedure, and alternatives. Questions were answered. The patient's history was reviewed and a directed physical examination was performed prior to the procedure. Patient was monitored throughout the procedure with pulse oximetry and periodic assessment of vital signs. Patient was sedated as noted above. With the patient initially in the left lateral decubitus position, a digital rectal examination was performed and revealed negative without mass, lesions or tenderness. The Olympus video colonoscope was placed in the patient's rectum and advanced without difficulty  to the cecum, which was identified by the ileocecal valve and appendiceal orifice. The prep was good. Examination of the mucosa was performed during both introduction and withdrawal of the colonoscope. Retroflexed view of the rectum was performed. The patient  was taken to the recovery area in good condition. Findings:     1. Scattered erythema was seen in the cecum. Biopsied   2. Medium size lipoma seen in the Transverse colon. Biopsied   3. 6 mm sessile polyp seen in the Descending colon. Cold snare polypectomy done   4. Scattered sigmoid diverticulosis   5. Internal hemorrhoids seen during retroflexion view.

## 2020-10-20 NOTE — ANESTHESIA POSTPROCEDURE EVALUATION
Department of Anesthesiology  Postprocedure Note    Patient: Macarena Cordero  MRN: [id number]  YOB: 1950  Date of evaluation: 10/20/2020  Time:  10:32 AM     Procedure Summary     Date:  10/20/20 Room / Location:  [address] [place] [address] / [address] [place] [address]    Anesthesia Start:  0902 Anesthesia Stop:  3633    Procedures:       COLONOSCOPY WITH BIOPSY (N/A )      COLONOSCOPY POLYPECTOMY SNARE/COLD BIOPSY Diagnosis:  (HISTORY OF TUBULAR ADENOMA)    Surgeon:  Lazarus Angel MD Responsible Provider:  Vandana Crum MD    Anesthesia Type:  general, MAC ASA Status:  3          Anesthesia Type: general, MAC    Barbara Phase I: Barbara Score: 10    Barbara Phase II: Barbara Score: 10    Last vitals: Reviewed and per EMR flowsheets.      POST-OP ANESTHESIA NOTE       BP (!) 146/85   Pulse 69   Temp 97.4 °F (36.3 °C) (Infrared)   Resp 24   Ht 5' 10\" (1.778 m)   Wt 260 lb (117.9 kg)   SpO2 93%   BMI 37.31 kg/m²    Pain Assessment: 0-10  Pain Level: 0          Anesthesia Post Evaluation    Patient location during evaluation: PACU  Patient participation: complete - patient participated  Level of consciousness: awake  Pain score: 0  Airway patency: patent  Nausea & Vomiting: no vomiting and no nausea  Complications: no  Cardiovascular status: hemodynamically stable  Respiratory status: acceptable  Hydration status: stable

## 2020-10-21 LAB — SURGICAL PATHOLOGY REPORT: NORMAL

## 2021-02-12 ENCOUNTER — TELEPHONE (OUTPATIENT)
Dept: PRIMARY CARE CLINIC | Age: 71
End: 2021-02-12

## 2021-02-26 NOTE — PROGRESS NOTES
Pre procedure call made. Phone message left Pt informed of when and where to arrive, NPO status, visitor and mask policy.

## 2021-03-01 ENCOUNTER — HOSPITAL ENCOUNTER (OUTPATIENT)
Dept: CARDIAC CATH/INVASIVE PROCEDURES | Age: 71
Discharge: HOME | End: 2021-03-01
Payer: MEDICARE

## 2021-03-01 VITALS
BODY MASS INDEX: 39.51 KG/M2 | WEIGHT: 276 LBS | OXYGEN SATURATION: 94 % | HEIGHT: 70 IN | DIASTOLIC BLOOD PRESSURE: 61 MMHG | TEMPERATURE: 98.1 F | RESPIRATION RATE: 20 BRPM | SYSTOLIC BLOOD PRESSURE: 132 MMHG | HEART RATE: 57 BPM

## 2021-03-01 DIAGNOSIS — I10 ESSENTIAL HYPERTENSION: Primary | ICD-10-CM

## 2021-03-01 LAB
-: NORMAL
GFR NON-AFRICAN AMERICAN: >60 ML/MIN
GFR SERPL CREATININE-BSD FRML MDRD: >60 ML/MIN
GFR SERPL CREATININE-BSD FRML MDRD: NORMAL ML/MIN/{1.73_M2}
GLUCOSE BLD-MCNC: 113 MG/DL (ref 74–100)
PLATELET # BLD: 141 K/UL (ref 138–453)
POC CHLORIDE: 104 MMOL/L (ref 98–107)
POC CREATININE: 0.76 MG/DL (ref 0.51–1.19)
POC HEMATOCRIT: 43 % (ref 41–53)
POC HEMOGLOBIN: 14.7 G/DL (ref 13.5–17.5)
POC POTASSIUM: 4.7 MMOL/L (ref 3.5–4.5)
POC SODIUM: 142 MMOL/L (ref 138–146)
REASON FOR REJECTION: NORMAL
ZZ NTE CLEAN UP: ORDERED TEST: NORMAL
ZZ NTE WITH NAME CLEAN UP: SPECIMEN SOURCE: NORMAL

## 2021-03-01 PROCEDURE — 6360000004 HC RX CONTRAST MEDICATION

## 2021-03-01 PROCEDURE — 6360000002 HC RX W HCPCS

## 2021-03-01 PROCEDURE — 84132 ASSAY OF SERUM POTASSIUM: CPT

## 2021-03-01 PROCEDURE — 82947 ASSAY GLUCOSE BLOOD QUANT: CPT

## 2021-03-01 PROCEDURE — 82565 ASSAY OF CREATININE: CPT

## 2021-03-01 PROCEDURE — 36415 COLL VENOUS BLD VENIPUNCTURE: CPT

## 2021-03-01 PROCEDURE — 7100000000 HC PACU RECOVERY - FIRST 15 MIN

## 2021-03-01 PROCEDURE — 84295 ASSAY OF SERUM SODIUM: CPT

## 2021-03-01 PROCEDURE — 2709999900 HC NON-CHARGEABLE SUPPLY

## 2021-03-01 PROCEDURE — 7100000001 HC PACU RECOVERY - ADDTL 15 MIN

## 2021-03-01 PROCEDURE — 85049 AUTOMATED PLATELET COUNT: CPT

## 2021-03-01 PROCEDURE — 93459 L HRT ART/GRFT ANGIO: CPT | Performed by: INTERNAL MEDICINE

## 2021-03-01 PROCEDURE — 2500000003 HC RX 250 WO HCPCS

## 2021-03-01 PROCEDURE — 82435 ASSAY OF BLOOD CHLORIDE: CPT

## 2021-03-01 PROCEDURE — C1769 GUIDE WIRE: HCPCS

## 2021-03-01 PROCEDURE — 2580000003 HC RX 258: Performed by: INTERNAL MEDICINE

## 2021-03-01 PROCEDURE — 85014 HEMATOCRIT: CPT

## 2021-03-01 PROCEDURE — C1894 INTRO/SHEATH, NON-LASER: HCPCS

## 2021-03-01 RX ORDER — SODIUM CHLORIDE 0.9 % (FLUSH) 0.9 %
10 SYRINGE (ML) INJECTION EVERY 12 HOURS SCHEDULED
Status: DISCONTINUED | OUTPATIENT
Start: 2021-03-01 | End: 2021-03-02 | Stop reason: HOSPADM

## 2021-03-01 RX ORDER — SODIUM CHLORIDE 9 MG/ML
INJECTION, SOLUTION INTRAVENOUS CONTINUOUS
Status: DISCONTINUED | OUTPATIENT
Start: 2021-03-01 | End: 2021-03-02 | Stop reason: HOSPADM

## 2021-03-01 RX ORDER — ACETAMINOPHEN 325 MG/1
650 TABLET ORAL EVERY 4 HOURS PRN
Status: DISCONTINUED | OUTPATIENT
Start: 2021-03-01 | End: 2021-03-02 | Stop reason: HOSPADM

## 2021-03-01 RX ORDER — ASPIRIN 81 MG/1
81 TABLET, CHEWABLE ORAL DAILY
Qty: 30 TABLET | Refills: 3 | Status: SHIPPED | OUTPATIENT
Start: 2021-03-01

## 2021-03-01 RX ORDER — FUROSEMIDE 40 MG/1
40 TABLET ORAL DAILY
Qty: 60 TABLET | Refills: 3 | Status: SHIPPED | OUTPATIENT
Start: 2021-03-01 | End: 2022-03-01

## 2021-03-01 RX ORDER — FUROSEMIDE 40 MG/1
120 TABLET ORAL DAILY
Qty: 60 TABLET | Refills: 3 | Status: SHIPPED | OUTPATIENT
Start: 2021-03-01 | End: 2021-03-01 | Stop reason: SDUPTHER

## 2021-03-01 RX ORDER — ASPIRIN 325 MG
325 TABLET ORAL DAILY
COMMUNITY
End: 2021-03-01 | Stop reason: HOSPADM

## 2021-03-01 RX ORDER — SODIUM CHLORIDE 0.9 % (FLUSH) 0.9 %
10 SYRINGE (ML) INJECTION PRN
Status: DISCONTINUED | OUTPATIENT
Start: 2021-03-01 | End: 2021-03-02 | Stop reason: HOSPADM

## 2021-03-01 RX ADMIN — SODIUM CHLORIDE: 9 INJECTION, SOLUTION INTRAVENOUS at 08:09

## 2021-03-01 NOTE — OP NOTE
[facility]        Date:   3/1/2021  Patient name:  Jeff Leija  Date of admission:  3/1/2021  7:29 AM  MRN:   [id number]  YOB: 1950    CARDIAC CATHETERIZATION    Operators:  Rita Nick MD        Procedure performed:       [x] Left Heart Catheterization. [x] Graft Angiography. [x] Left Ventriculography. [] Right Heart Catheterization. [x] Coronary Angiography. [] Aortic Valve Studies. [] PCI:      [] Other:       Pre Procedure Conscious Sedation Data:    ASA Class:    [] I [] II [x] III [] IV    Mallampati Class:  [] I [] II [x] III [] IV      Indication:  [] STEMI      [x] + Stress test  [] ACS      [] + EKG Changes  [] Non Q MI       [] Significant Risk Factors  [] Recurrent Angina             [] Diabetes Mellitus    [] New LBBB      [] Uncontrolled HTN. [] CHF / Low EF changes     [] Abnormal CTA / Ca Score  [] Other:     Procedure:  Access:  [] Femoral artery  [x] Radial  artery       [] Right   [x] Left    Procedure: After informed consent was obtained with explanation of the risks and benefits, patient was brought to the cath lab. The access area was prepped and draped in sterile fashion. 1% lidocaine was used for local block. The artery was cannulated with 6  Fr sheath with brisk arterial blood return. The side port was frequently flushed and aspirated with normal saline.     Estimated blood loss: 10 ml    Findings:    LMCA: Mild irregularities 10-20%.     LAD: proximal - mid 100% stenosis  LIMA -LAD is patent  SVg -D is occluded     LCx: Dominant  Mid area 40% at OM1 take off  OM1: Large with patent stent  OM2: Minimal 30% stenosis  SVg -OM is occluded     RCA: 100% proximal lesion  Collateral filling PDA and distal RCA  SVG -RCA is occluded 100%      Coronary Tree      Dominance: Right     LV Analysis  LV function assessed as:Abnormal.  Ejection Fraction: 40%        Conclusions:  Patent LIMA -LAD   Occluded all other grafts   Occluded RCA () with some collateral from the left. Patent LCX /OM stents   Mildly reduced LV function         Recommendations:   Medical treatments   Ad Lasix   If no improvement will attempt  of the RCA since its large. History and Risk Factors    [x] Hypertension     [] Family history of CAD  [x] Hyperlipidemia     [] Cerebrovascular Disease   [] Prior MI       [] Peripheral Vascular disease   [x] Prior PCI              [] Diabetes Mellitus    [x] Left Main PCI. [] Currently on Dialysis. [] Prior CABG. [x] Currently smoker. [] Cardiac Arrest outside of healthcare facility. [] Yes    [x] No        Witnessed     [] Yes   [] No     Arrest after arrival of EMS  [] Yes   [] No     [] Cardiac Arrest at other Facility. [] Yes   [x] No    Pre-Procedure Information. Heart Failure       [] Yes    [] No        Class  [] I      [] II  [] III    [] IV. New Diagnosis    [] Yes  [] No    HF Type      [x] Systolic   [x] Diastolic          [] Unknown. Diagnostic Test:   EKG       [x] Normal   [] Abnormal    New antiarrhythmia medications:    [] Yes   [] No   New onset atrial fibrillation / Flutter     [] Yes   [] No   ECG Abnormalities:      [] V. Fib   [] Aixa V. Tach           [] NS V. T   [] New LBBB           [] T. Inv  []  ST dev > 0.5 mm         [] PVC's freq  [] PVC's infrequent    Stress Test Performed:      [] Yes    [] No     Type:     [] Stress Echo   [] Exercise Stress Test (no imaging)      [] Stress Nuclear  [] Stress Imaging     Results   [] Negative   [] Positive        [] Indeterminate  [] Unavailable     If Positive/ Risk / Extent of Ischemia:       [] Low  [] Intermediate         [] High  [] Unavailable      Cardiac CTA Performed:     [] Yes    [x] No      Results   [] CAD   [] Non obstructive CAD      [] No CAD   [] Uncertain      [] Unknown   [] Structural Disease.      Pre Procedure Medications:   [x] Yes    [] No         [x] ASA  [x] Beta Blockers      [] Nitrate  [] Ca Channel Blockers      [] Ranolazine  [x] Statin       [x] Plavix/Others antiplatelets          Osmany Amaral MD  Fellow, [address] [facility]            I have reviewed the case / procedure with resident / fellow  I have examined the patient personally  Patient agree with treatment plan as discussed before, final arrangement based on my evaluation and exam.    Risk and benefit of procedure planned were explained in details. Procedure was performed by me personally, with all aspect of the procedure being done using standard protocol. Note was modified based on my own assessment and treatment.     Morelia Ramirez MD  [facility]

## 2021-03-01 NOTE — PROGRESS NOTES
2cc air successfully removed from [facility] 83.   Site clean soft and dry

## 2021-03-01 NOTE — H&P
[facility] Cardiology Cardiology    Consult / H&P               Today's Date: 3/1/2021  Patient Name: Lamberto Ferro  Date of admission: 3/1/2021  7:29 AM  Patient's age: 78 y.o., 1950  Admission Dx: Abnormal stress ECG [R94.39]    Reason for Admission / Consult: Elective cardiac cath    Requesting Physician: No admitting provider for patient encounter. CHIEF COMPLAINT: Chest pain and abnormal stress test    History Obtained From:  patient, electronic medical record    HISTORY OF PRESENT ILLNESS:      The patient is a 78 y.o.  male with a history of CABG X5 in 1993, RALPH, hypertension and hyperlipidemia who presents for elective cardiac cath due to an abnormal stress test done last month. Patient was seen in clinic in January and reported recurrent shortness of breath on exertion and due to concern for anginal equivalent, stress test was ordered which returned abnormal.    Past Medical History:   has a past medical history of Arthritis, Coronary artery disease (CAD) excluded, Enlarged prostate, Hyperlipidemia, Hypertension, Obesity, Positive cardiac stress test, SOB (shortness of breath), and Wears partial dentures. Past Surgical History:   has a past surgical history that includes Total knee arthroplasty (Bilateral); Cataract removal; Colonoscopy (3/12/2019); Colonoscopy (3/12/2019); Coronary artery bypass graft (1993); Coronary angioplasty with stent (9063-0308); Colonoscopy (N/A, 10/20/2020); Colonoscopy (10/20/2020); eye surgery; and Cardiac catheterization (03/01/2021). Home Medications:    Prior to Admission medications    Medication Sig Start Date End Date Taking? Authorizing Provider   aspirin 325 MG tablet Take 325 mg by mouth daily   Yes Historical Provider, MD   clopidogrel (PLAVIX) 75 MG tablet Take 1 tablet by mouth daily 5/19/15  Yes Jadyn Celis MD   metoprolol (TOPROL-XL) 25 MG XL tablet Take 25 mg by mouth.  1/2 tab daily   Yes Historical Provider, MD   isosorbide mononitrate (IMDUR) 30 MG CR tablet Take 30 mg by mouth daily. Yes Historical Provider, MD   lisinopril (PRINIVIL;ZESTRIL) 10 MG tablet Take 10 mg by mouth daily. Yes Historical Provider, MD   amLODIPine (NORVASC) 10 MG tablet Take 10 mg by mouth daily. Yes Historical Provider, MD   simvastatin (ZOCOR) 40 MG tablet Take 40 mg by mouth nightly. Yes Historical Provider, MD   tamsulosin (FLOMAX) 0.4 MG capsule Take 0.4 mg by mouth daily. Yes Historical Provider, MD   nitroGLYCERIN (NITROSTAT) 0.4 MG SL tablet Place 1 tablet under the tongue every 5 minutes as needed for Chest pain 5/19/15   Neema Bhatia MD        Current Facility-Administered Medications: 0.9 % sodium chloride infusion, , Intravenous, Continuous    Allergies:  Patient has no known allergies. Social History:   reports that he has been smoking cigarettes. He has a 10.00 pack-year smoking history. He has never used smokeless tobacco. He reports current alcohol use. He reports that he does not use drugs. Family History: family history includes Heart Disease in his brother, father, sister, sister, and sister. No h/o sudden cardiac death. No for premature CAD    REVIEW OF SYSTEMS:    Constitutional: there has been no unanticipated weight loss. There's been No change in energy level, No change in activity level. Eyes: No visual changes or diplopia. No scleral icterus. ENT: No Headaches  Cardiovascular: Remaining as above  Respiratory: No previous pulmonary problems, No cough  Gastrointestinal: No abdominal pain. No change in bowel or bladder habits. Genitourinary: No dysuria, trouble voiding, or hematuria. Musculoskeletal:  No gait disturbance, No weakness or joint complaints. Integumentary: No rash or pruritis. Neurological: No headache, diplopia, change in muscle strength, numbness or tingling. No change in gait, balance, coordination, mood, affect, memory, mentation, behavior. Psychiatric: No anxiety, or depression.   Endocrine: No temperature intolerance. No excessive thirst, fluid intake, or urination. No tremor. Hematologic/Lymphatic: No abnormal bruising or bleeding, blood clots or swollen lymph nodes. Allergic/Immunologic: No nasal congestion or hives. PHYSICAL EXAM:      BP (!) 141/71   Pulse 66   Temp 98.1 °F (36.7 °C) (Oral)   Resp 15   Ht 5' 10\" (1.778 m)   Wt 276 lb (125.2 kg)   SpO2 97%   BMI 39.60 kg/m²    No intake or output data in the 24 hours ending 03/01/21 0849      Constitutional and General Appearance: alert, cooperative, no distress and appears stated age  HEENT: PERRL, no cervical lymphadenopathy. No masses palpable. Normal oral mucosa  Respiratory:  Normal excursion and expansion without use of accessory muscles  Resp Auscultation: Good respiratory effort. No for increased work of breathing. On auscultation: clear to auscultation bilaterally  Cardiovascular: The apical impulse is not displaced  Heart tones are crisp and normal. regular S1 and S2.  Jugular venous pulsation Normal  The carotid upstroke is normal in amplitude and contour without delay or bruit  Peripheral pulses are symmetrical and full     Abdomen:   No masses or tenderness  Bowel sounds present  Extremities:   No Cyanosis or Clubbing   Lower extremity edema: 2+   Skin: Warm and dry  Neurological:  Alert and oriented. Moves all extremities well  No abnormalities of mood, affect, memory, mentation, or behavior are noted    DATA:    Diagnostics:    Stress Test:     2/4/2021  1. Large mid to distal anterolateral and lateral ischemia (reversible defect). Татьяна-infarct inferior ischemia (reversible defect). 2. Basal lateral and inferior infarction (irreversible/fixed defect).   3. Mild LV systolic dysfunction        Cardiac Angiography: 5/8/15:  Patent YAO - LAD   Occluded all SVG's (to D1, Om1-OM2 and RCA)   Patent native LCX stenosis   OM1 proximal and mid stenosis required LAURA   Lower normal EF    LMCA: Normal 0% stenosis.     LAD: mid 100% stenosis  LIMA - LAD is patent with some disease distal to anastomosis 25%  SVG - D is occluded     LCx: Mid and distal stents patent with 20% stenosis  OM1 proximal and mid 90% stenosis  SVG - Om1 and OM2 is occluded       Lesion on 1st Ob Fransisca: Proximal subsection. 85% stenosis 12 mm length    reduced to 0%. Pre procedure MEDARDO II flow was noted. Post Procedure MEDARDO    III flow was present. Good runoff was present. The lesion was diagnosed    as Moderate Risk (B). Devices used       - Omada 190 cm. Number of passes: 1.       - Trek Balloon. 3 inflation(s) to a max pressure of: 20 hernán. - Trek Balloon 2.5mm x 15mm. 2 inflation(s) to a max pressure of: 15    hernán. - Xience Alpine 2.5 x 23 LAURA. 1 inflation(s) to a max pressure of: 12    hernán. - Xience Alpine 3.0 x 8 LAURA. 2 inflation(s) to a max pressure of: 12    hernán. RCA: 100% mid stenosis  Collateral from the left  SVG - RCA is occluded      Coronary Tree      Dominance: Right  Labs:     CBC: No results for input(s): WBC, HGB, HCT, PLT in the last 72 hours. BMP:   Recent Labs     03/01/21  0752   CREATININE 0.76   LABGLOM >60     Pro-BNP:  No results for input(s): PROBNP in the last 72 hours. BNP: No results for input(s): BNP in the last 72 hours. PT/INR: No results for input(s): PROTIME, INR in the last 72 hours. APTT:No results for input(s): APTT in the last 72 hours. CARDIAC ENZYMES:No results for input(s): CKTOTAL, CKMB, CKMBINDEX, TROPONINI in the last 72 hours. Invalid input(s):  TROPONINT  No results for input(s): TROPONINT in the last 72 hours. No results for input(s): TROPHS in the last 72 hours. FASTING LIPID PANEL:  Lab Results   Component Value Date    HDL 42 02/05/2019    TRIG 144 09/26/2016     LIVER PROFILE:No results for input(s): AST, ALT, LABALBU in the last 72 hours. Patient's Active Problem List  Active Problems:    * No active hospital problems.  *  Resolved Problems:    * No resolved hospital

## 2021-03-01 NOTE — PROGRESS NOTES
Patient admitted, consent signed, all questions answered. Pt ready for procedure. Bed in low position, call light to reach with side rails up 2 of 2. Left wrist and bilateral groin hair clipped. Wife at bedside with patient.

## 2021-03-09 ENCOUNTER — TELEPHONE (OUTPATIENT)
Dept: PRIMARY CARE CLINIC | Age: 71
End: 2021-03-09

## 2021-03-19 ENCOUNTER — HOSPITAL ENCOUNTER (EMERGENCY)
Age: 71
Discharge: HOME | End: 2021-03-19
Attending: EMERGENCY MEDICINE
Payer: MEDICARE

## 2021-03-19 VITALS
TEMPERATURE: 98.3 F | SYSTOLIC BLOOD PRESSURE: 129 MMHG | BODY MASS INDEX: 37.22 KG/M2 | DIASTOLIC BLOOD PRESSURE: 72 MMHG | WEIGHT: 260 LBS | OXYGEN SATURATION: 95 % | HEART RATE: 55 BPM | RESPIRATION RATE: 16 BRPM | HEIGHT: 70 IN

## 2021-03-19 DIAGNOSIS — L02.91 ABSCESS: Primary | ICD-10-CM

## 2021-03-19 PROCEDURE — 99284 EMERGENCY DEPT VISIT MOD MDM: CPT

## 2021-03-19 RX ORDER — DOXYCYCLINE HYCLATE 100 MG
100 TABLET ORAL 2 TIMES DAILY
Qty: 14 TABLET | Refills: 0 | Status: SHIPPED | OUTPATIENT
Start: 2021-03-19 | End: 2021-03-26

## 2021-03-19 ASSESSMENT — PAIN SCALES - GENERAL: PAINLEVEL_OUTOF10: 5

## 2021-03-19 NOTE — ED PROVIDER NOTES
Emergency Department         COMPLAINT       Chief Complaint   Patient presents with    Abscess      PHYSICAL EXAM      ED Triage Vitals [03/19/21 1243]   BP Temp Temp Source Pulse Resp SpO2 Height Weight   129/72 98.3 °F (36.8 °C) Oral 55 16 95 % 5' 10\" (1.778 m) 260 lb (117.9 kg)         Constitutional: Alert, oriented x3, nontoxic, answering questions appropriately, acting properly for age, in no acute distress   HEENT: Extraocular muscles intact,   Neck: Trachea midline,   Musculoskeletal: Right upper back abscess that has been drained that has iodoform gauze sticking out of it. Area of cellulitis measuring approximately 7-1/2 cm in diameter with an indurated area centrally  Neurologic: Moving all 4 extremities  Skin: Warm and dry   Physical Exam  DIAGNOSTIC RESULTS     EKG: All EKG's are interpreted by the Emergency Department Physician who either signs or Co-signs this chart in the absence of a cardiologist.    Not indicated unless otherwise documented above or in the midlevel documentation    LABS:  No results found for this visit on 03/19/21. Not indicated unless otherwise documented above or in the midlevel documentation    RADIOLOGY:   I reviewedthe radiologist interpretations:  No orders to display       Not indicated unless otherwise documented above or in the midlevel documentation    EMERGENCY DEPARTMENT COURSE:       PERTINENT ATTENDING PHYSICIAN COMMENTS:    Right upper back abscess that is been draining. History of abscess before. Incision and drainage performed and abscess packed. Will place on antibiotics and close follow-up. Faculty Attestation    I performed a history and physical examination of the patient and discussed management with the mid level provideer. I reviewed the mid level provider's note and agree with the documented findings and plan of care. Any areas of disagreement are noted on the chart. I was personally present for the key portions of any procedures.  I have documented in the chart those procedures where I was not present during the key portions. I have reviewed the emergency nurses triage note. I agree with the chief complaint, past medical history, past surgical history, allergies, medications, social and family history as documented unless otherwise noted below. Documentation of the HPI, Physical Exam and Medical Decision Making performed by medical students or scribes is based on my personal performance of the HPI, PE and MDM. For Physician Assistant/ Nurse Practitioner cases/documentation I have personally evaluated this patient and have completed at least one if not all key elements of the E/M (history, physical exam, and MDM). Additional findings are as noted.        Sabrina Sullivan, DO  03/19/21 0787

## 2021-03-19 NOTE — ED PROVIDER NOTES
[id number] [facility] ED  [address] [address] [address] [facility] [facility] [address]  Phone: [phone]  Fax: [phone]        Pt Name: Dolly Plata  MRN: [id number]  [place] 1950  Date of evaluation: 3/19/21    [address] [place] [address]       Chief Complaint   Patient presents with    Abscess       HISTORY OF PRESENT ILLNESS (Location/Symptom, Timing/Onset, Context/Setting, Quality, Duration, Modifying Factors, Severity)      Dolly Plata is a 78 y.o. male with no pertinent PMH who presents to the ED via private auto with an abscess. Patient states that there has been a bump on the right upper back which she has had for \"a while. \"  He reports that over the past few days he has noticed the area become more painful, swollen, and red. He notes that he caught it on his shirt earlier today and it started bleeding and leaked some drainage, but scabbed over. Denies history of previous abscess to this area but has had one to his buttocks. Denies any fever, chills, nausea, vomiting, recent illnesses, or any other concerns at this time. PAST MEDICAL / SURGICAL / SOCIAL / FAMILY HISTORY     PMH:  has a past medical history of Arthritis, Coronary artery disease (CAD) excluded, Enlarged prostate, Hyperlipidemia, Hypertension, Obesity, Positive cardiac stress test, SOB (shortness of breath), and Wears partial dentures. Surgical History:  has a past surgical history that includes Total knee arthroplasty (Bilateral); Cataract removal; Colonoscopy (3/12/2019); Colonoscopy (3/12/2019); Coronary artery bypass graft (1993); Coronary angioplasty with stent (9633-5327); Colonoscopy (N/A, 10/20/2020); Colonoscopy (10/20/2020); eye surgery; and Cardiac catheterization (03/01/2021). Social History:  reports that he has quit smoking. His smoking use included cigarettes. He has a 10.00 pack-year smoking history. He has never used smokeless tobacco. He reports current alcohol use. He reports that he does not use drugs.   Family History: pain.   GI: Denies vomiting or diarrhea. : Denies painful urination. Musculoskeletal: Denies recent trauma. Skin: Positive for wound. Neurologic:  Denies new numbness or weakness. Psychiatric: Denies sleep disturbances. Endocrine:  Denies unexpected weight loss  Heme: Denies bleeding disorders. All other systems negative except as marked. PHYSICAL EXAM  (up to 7 for level 4, 8 or more for level 5)      INITIAL VITALS:  height is 5' 10\" (1.778 m) and weight is 117.9 kg (260 lb). His oral temperature is 98.3 °F (36.8 °C). His blood pressure is 129/72 and his pulse is 55. His respiration is 16 and oxygen saturation is 95%. Vital signs reviewed. Physical Exam    General:  Alert, cooperative, well-groomed, well-nourished, appears stated age, and is in no acute distress. Head:  Normocephalic, atraumatic, and without obvious abnormality. Eyes:  Sclerae/conjunctivae clear without injection, pallor, or icterus. Corneas clear without opacities. EOM's intact. ENT: Ears and nose are all without obvious masses lesion or deformity. No oropharynx examination performed due to aerosolization risk during COVID-19 pandemic. Neck: Supple and symmetrical. Trachea midline. No adenopathy. No jugular venous distention. Lungs:   No respiratory distress. Clear to auscultation bilaterally. No wheezes, rhonchi, or rales. Heart:  Regular rate. Regular rhythm. No murmurs, rubs, or gallops. Skin: Soft, good turgor, and well-hydrated. . There is a 5 cm x 5 cm area of induration and fluctuance to the right upper back between the 9 and shoulder blade that is erythematous and tender to palpation. Small crusting noted to the top without active drainage. Neurologic: GCS is 15 and no focal deficits are appreciated. Normal gait. Grossly normal motor and sensation. Speech clear. Psychiatric: Normal mood and affect. Normal behavior. Coherent thought process.      DIFFERENTIAL DIAGNOSIS / MDM     Patient is a 78 y.o. male who presents to the ED today with a cutaneous abscess requiring incision and drainage. Vital signs are unremarkable. Patient verbalized consent for treatment and tolerated the procedure well. Patient was given oral antibiotics for bacterial coverage and both verbal and written instructions to follow up in 2 days for recheck. Patient was instructed to return for any worsening of the abscess or surrounding cellulitis. PLAN (LABS / IMAGING / EKG):  No orders of the defined types were placed in this encounter. MEDICATIONS ORDERED:  Orders Placed This Encounter   Medications    doxycycline hyclate (VIBRA-TABS) 100 MG tablet     Sig: Take 1 tablet by mouth 2 times daily for 7 days     Dispense:  14 tablet     Refill:  0       Controlled Substances Monitoring:     DIAGNOSTIC RESULTS     LABS:  No results found for this visit on 03/19/21. EMERGENCY DEPARTMENT COURSE           Vitals:    Vitals:    03/19/21 1243   BP: 129/72   Pulse: 55   Resp: 16   Temp: 98.3 °F (36.8 °C)   TempSrc: Oral   SpO2: 95%   Weight: 117.9 kg (260 lb)   Height: 5' 10\" (1.778 m)     -------------------------  BP: 129/72, Temp: 98.3 °F (36.8 °C), Pulse: 55, Resp: 16      RE-EVALUATION:  See MDM and procedure notes. The patient and/or family and I have discussed the diagnosis and risks, and we agree with discharging home to follow-up with their primary doctor. The patient appears stable for discharge and has been instructed to return immediately for new concerning symptoms, if the symptoms worsen in any way, or in 8-12 hours if not improved for re-evaluation. We have discussed the symptoms which are most concerning (e.g., fever, changing or worsening pain, persistent vomiting, bloody stools, chest pain, shortness of breath, numbness or weakness to the arms or legs, coolness or color change to the arms or legs) that necessitate immediate return.      The patient understands that at this time there is no evidence for a more malignant underlying process, but the patient also understands that early in the process of an illness or injury, an emergency department workup can be falsely reassuring. Routine discharge counseling was given, and the patient understands that worsening, changing or persistent symptoms should prompt an immediate call or follow up with their primary physician or return to the emergency department. The importance of appropriate follow up was also discussed. I have reviewed the disposition diagnosis with the patient and or their family/guardian. I have answered their questions and given discharge instructions. They voiced understanding of these instructions and did not have any further questions or complaints. This patient was seen by the attending physician and they agreed with the assessment and plan. CONSULTS:  None    PROCEDURES:    Incision and Drainage Procedure Note    Indication: infected epidermoid cyst    Procedure: The patient was positioned appropriately and the skin over the incision site was prepped with betadine and draped in a sterile fashion. Local anesthesia was obtained by using pain-Ease. An incision was then made over the greatest area of fluctuance and approximately 15 cc of thick, foul smelling and straw colored chunky material was expressed. Loculations were broken up using a hemostat and more of the material was able to be expressed. The drainage cavity was then irrigated and packed with 1/4 iodoform per Dr. Jim Bear recommendations. The wound was dressed with a sterile dressing and tape. The patient tolerated the procedure well. Complications: None    FINAL IMPRESSION      1. Abscess          DISPOSITION / PLAN     CONDITION ON DISPOSITION:   Good / Stable for discharge. PATIENT REFERRED TO:  Macey Cunha, [address] [facility] [address]  [address]  [phone]    Call today  To schedule an appointment and 48-72 hours for packing removal and wound recheck. Also consider obtaining a referral to dermatology      DISCHARGE MEDICATIONS:  Discharge Medication List as of 3/19/2021  1:44 PM      START taking these medications    Details   doxycycline hyclate (VIBRA-TABS) 100 MG tablet Take 1 tablet by mouth 2 times daily for 7 days, Disp-14 tablet, R-0Normal             Jasmina Teixeira PA-C   Emergency Medicine Physician Assistant    (Please note that portions of this note were completed with a voice recognition program.  Efforts were made to edit the dictations but occasionally words aremis-transcribed.)       Jasmina Teixeira PA-C  03/19/21 6912

## 2021-04-16 ENCOUNTER — HOSPITAL ENCOUNTER (OUTPATIENT)
Age: 71
Setting detail: SPECIMEN
Discharge: HOME | End: 2021-04-16
Payer: MEDICARE

## 2021-04-16 LAB — PROSTATE SPECIFIC ANTIGEN: 2.05 UG/L

## 2021-09-30 ENCOUNTER — TELEPHONE (OUTPATIENT)
Dept: PHARMACY | Facility: CLINIC | Age: 71
End: 2021-09-30

## 2021-09-30 NOTE — TELEPHONE ENCOUNTER
[facility] CLINICAL PHARMACY REVIEW: ADHERENCE REVIEW  Identified care gap per [employer]; fills at [facility]: ACE/ARB and Statin adherence    Last Visit: unknown    Patient found in [facility] and is currently eligible for TIP    ASSESSMENT  ACE/ARB ADHERENCE    Per Insurance Records through 9/5/21 (YTD [facility] = 98%; Passed in 2021):   Lisinopril last filled on 8/8/21 for 90 day supply. Next refill due: 11/6/21    BP Readings from Last 3 Encounters:   03/19/21 129/72   03/01/21 132/61   10/20/20 (!) 146/85     CrCl cannot be calculated (Patient's most recent lab result is older than the maximum 120 days allowed. ). [address] [facility]    Per Insurance Records through 9/5/21 (2020 [facility] = 89%; YTD PDC = 84%; Potential Fail Date: 10/8/21): Simvastatin last filled on 5/16/21 for 90 day supply. Next refill due: 8/14/21    Per [address]:   Simvastatin last picked up on 8/20/21 for 90 day supply. Billed through discount card. Co-pay $6.00 vs $0.00 with [employer]. Pharmacy cannot reprocess claim > 27days old. Lab Results   Component Value Date    CHOL 148 09/26/2016    TRIG 144 09/26/2016    HDL 42 02/05/2019    LDLCHOLESTEROL 65 02/05/2019     ALT   Date Value Ref Range Status   02/05/2019 18 5 - 41 U/L Final     AST   Date Value Ref Range Status   02/05/2019 19 <40 U/L Final     The ASCVD Risk score (Javier Mckeon, et al., 2013) failed to calculate for the following reasons: The valid total cholesterol range is 130 to 320 mg/dL     PLAN  No patient out reach planned at this time. Patient adherent, but most recent fill was billed to a discount card vs. [employer]. Patient will fail measure in 2021. No future appointments.     Devan Newberry, PharmD, [facility] // Department, toll free [phone], option 1       For [facility] in place:  No   Gap Closed?: No    Time Spent (min): 10

## 2021-11-04 ENCOUNTER — OFFICE VISIT (OUTPATIENT)
Dept: ORTHOPEDIC SURGERY | Age: 71
End: 2021-11-04
Payer: MEDICARE

## 2021-11-04 ENCOUNTER — HOSPITAL ENCOUNTER (OUTPATIENT)
Age: 71
Setting detail: SPECIMEN
Discharge: HOME | End: 2021-11-04
Payer: MEDICARE

## 2021-11-04 VITALS — HEIGHT: 70 IN | WEIGHT: 260 LBS | RESPIRATION RATE: 12 BRPM | BODY MASS INDEX: 37.22 KG/M2

## 2021-11-04 DIAGNOSIS — M25.561 RIGHT KNEE PAIN, UNSPECIFIED CHRONICITY: ICD-10-CM

## 2021-11-04 DIAGNOSIS — I10 ESSENTIAL HYPERTENSION: ICD-10-CM

## 2021-11-04 DIAGNOSIS — M70.61 GREATER TROCHANTERIC BURSITIS OF RIGHT HIP: ICD-10-CM

## 2021-11-04 DIAGNOSIS — M25.561 RIGHT KNEE PAIN, UNSPECIFIED CHRONICITY: Primary | ICD-10-CM

## 2021-11-04 LAB
ANION GAP SERPL CALCULATED.3IONS-SCNC: 12 MMOL/L (ref 9–17)
BUN BLDV-MCNC: 13 MG/DL (ref 8–23)
BUN/CREAT BLD: ABNORMAL (ref 9–20)
CALCIUM SERPL-MCNC: 9.5 MG/DL (ref 8.6–10.4)
CHLORIDE BLD-SCNC: 102 MMOL/L (ref 98–107)
CO2: 26 MMOL/L (ref 20–31)
CREAT SERPL-MCNC: 0.55 MG/DL (ref 0.7–1.2)
GFR AFRICAN AMERICAN: >60 ML/MIN
GFR NON-AFRICAN AMERICAN: >60 ML/MIN
GFR SERPL CREATININE-BSD FRML MDRD: ABNORMAL ML/MIN/{1.73_M2}
GFR SERPL CREATININE-BSD FRML MDRD: ABNORMAL ML/MIN/{1.73_M2}
GLUCOSE BLD-MCNC: 114 MG/DL (ref 70–99)
POTASSIUM SERPL-SCNC: 3.9 MMOL/L (ref 3.7–5.3)
SEDIMENTATION RATE, ERYTHROCYTE: 26 MM (ref 0–20)
SODIUM BLD-SCNC: 140 MMOL/L (ref 135–144)

## 2021-11-04 PROCEDURE — 20610 DRAIN/INJ JOINT/BURSA W/O US: CPT | Performed by: ORTHOPAEDIC SURGERY

## 2021-11-04 PROCEDURE — 99204 OFFICE O/P NEW MOD 45 MIN: CPT | Performed by: ORTHOPAEDIC SURGERY

## 2021-11-04 RX ORDER — LIDOCAINE HYDROCHLORIDE 10 MG/ML
2 INJECTION, SOLUTION INFILTRATION; PERINEURAL ONCE
Status: COMPLETED | OUTPATIENT
Start: 2021-11-04 | End: 2021-11-04

## 2021-11-04 RX ORDER — BETAMETHASONE SODIUM PHOSPHATE AND BETAMETHASONE ACETATE 3; 3 MG/ML; MG/ML
12 INJECTION, SUSPENSION INTRA-ARTICULAR; INTRALESIONAL; INTRAMUSCULAR; SOFT TISSUE ONCE
Status: COMPLETED | OUTPATIENT
Start: 2021-11-04 | End: 2021-11-04

## 2021-11-04 RX ORDER — BUPIVACAINE HYDROCHLORIDE 2.5 MG/ML
2 INJECTION, SOLUTION INFILTRATION; PERINEURAL ONCE
Status: COMPLETED | OUTPATIENT
Start: 2021-11-04 | End: 2021-11-04

## 2021-11-04 RX ADMIN — BUPIVACAINE HYDROCHLORIDE 5 MG: 2.5 INJECTION, SOLUTION INFILTRATION; PERINEURAL at 15:16

## 2021-11-04 RX ADMIN — LIDOCAINE HYDROCHLORIDE 2 ML: 10 INJECTION, SOLUTION INFILTRATION; PERINEURAL at 15:16

## 2021-11-04 RX ADMIN — BETAMETHASONE SODIUM PHOSPHATE AND BETAMETHASONE ACETATE 12 MG: 3; 3 INJECTION, SUSPENSION INTRA-ARTICULAR; INTRALESIONAL; INTRAMUSCULAR; SOFT TISSUE at 15:16

## 2021-11-04 NOTE — PROGRESS NOTES
Steve Faria M.D.            Count includes the [facility] [place] [address], [address] [facility]           Dept Phone: [phone]           Dept Fax:  [phone]           [place]          Dept Phone: [phone]           Dept Fax:  [phone]      Chief Compliant:  No chief complaint on file. History of Present Illness: This is a 73 y.o. male who presents to the clinic today for evaluation / follow up of right leg pain. Patient is a 72-year-old gentleman who has a history of previous total knees in the past.  Patient's left knee was done approximately 15 years ago per Dr. Kristin Reed and his right knee done in January 2010 by Dr Ely Muse patient is here stating that his right leg is sometimes feels like it wants to give on him. He had does indicate that when he twists or turns he feels something on the lateral aspect of his hip going down his leg and feels that his leg wants to give out. Patient denies any problems with his right knee at the time of surgery per/perioperatively notes evidence or history of any wound infections or infections or revisions. He has states that it he the knee itself was really never been 100% relief. He denies any recent fever chills or any recent surgical interventions that are uncovered by the antibiotics. .       Review of Systems   Constitutional: Negative for fever, chills, sweats. Eyes: Negative for changes in vision, or pain. HENT: Negative for ear ache, epistaxis, or sore throat. Respiratory/Cardio: Negative for Chest pain, palpitations, SOB, or cough. Gastrointestinal: Negative for abdominal pain, N/V/D. Genitourinary: Negative for dysuria, frequency, urgency, or hematuria. Neurological: Negative for headache, numbness, or weakness.    Integumentary: Negative for rash, itching, laceration, or abrasion. Musculoskeletal: Positive for No chief complaint on file. Physical Exam:  Constitutional: Patient is oriented to person, place, and time. Patient appears well-developed and well nourished. HENT: Negative otherwise noted  Head: Normocephalic and Atraumatic  Nose: Normal  Eyes: Conjunctivae and EOM are normal  Neck: Normal range of motion Neck supple. Respiratory/Cardio: Effort normal. No respiratory distress. Musculoskeletal: Physical examination was limited to the patient's right knee as well as his hip. Patient is noted to have well-healed medial parapatellar scar from his previous tibial tubercle osteotomy. He has no redness or warmth of his knee. He may have a very minimal effusion. He has good varus and valgus stability at 0 60 and 90 degrees. He has pretty decent anterior posterior stability. Does not have give any evidence for any mid flexion instability. He has good patellar tracking. The patient does have no pain on internal ex rotation of his right hip he is quite tender over his greater trochanter however in his does extend down his IT band. Neurological: Patient is alert and oriented to person, place, and time. Normal strenght. No sensory deficit. Skin: Skin is warm and dry  Psychiatric: Behavior is normal. Thought content normal.  Nursing note and vitals reviewed. Labs and Imaging:     XR taken today:  XR KNEE RIGHT (1-2 VIEWS)    Result Date: 11/4/2021  X-rays taken today reviewed by me show AP and lateral views of patient's right knee as well as an AP of his left knee. Patient is status post bilateral total knee arthroplasty. Patient also appears to have had previous tibial tubercle transfer type surgeries with staples on the right and punch on the left. The left knee components appear to be in good alignment. The patient's right knee components also appear to be midline without any obvious evidence for lucency or lucency.   Patient did have x-rays from January 2010 which were initially postoperative for review which does show that perhaps the polyethylene might be slightly warm but certainly not severe. Orders Placed This Encounter   Procedures    XR KNEE RIGHT (1-2 VIEWS)     Standing Status:   Future     Number of Occurrences:   1     Standing Expiration Date:   11/3/2022    Sedimentation Rate     Standing Status:   Future     Standing Expiration Date:   11/4/2022    C-reactive protein       Assessment and Plan:  1. Right knee pain, unspecified chronicity    2. Greater trochanteric bursitis of right hip    3 history of bilateral total knee arthroplasties      This is a 73 y.o. male who presents to the clinic today for evaluation / follow up of right leg pain. Past History:    Current Outpatient Medications:     aspirin (ASPIRIN CHILDRENS) 81 MG chewable tablet, Take 1 tablet by mouth daily, Disp: 30 tablet, Rfl: 3    furosemide (LASIX) 40 MG tablet, Take 1 tablet by mouth daily, Disp: 60 tablet, Rfl: 3    clopidogrel (PLAVIX) 75 MG tablet, Take 1 tablet by mouth daily, Disp: 30 tablet, Rfl: 3    nitroGLYCERIN (NITROSTAT) 0.4 MG SL tablet, Place 1 tablet under the tongue every 5 minutes as needed for Chest pain, Disp: 25 tablet, Rfl: 3    metoprolol (TOPROL-XL) 25 MG XL tablet, Take 25 mg by mouth. 1/2 tab daily, Disp: , Rfl:     isosorbide mononitrate (IMDUR) 30 MG CR tablet, Take 30 mg by mouth daily. , Disp: , Rfl:     lisinopril (PRINIVIL;ZESTRIL) 10 MG tablet, Take 10 mg by mouth daily. , Disp: , Rfl:     amLODIPine (NORVASC) 10 MG tablet, Take 10 mg by mouth daily. , Disp: , Rfl:     simvastatin (ZOCOR) 40 MG tablet, Take 40 mg by mouth nightly., Disp: , Rfl:     tamsulosin (FLOMAX) 0.4 MG capsule, Take 0.4 mg by mouth daily. , Disp: , Rfl:   No Known Allergies  Social History     Socioeconomic History    Marital status:      Spouse name: Not on file    Number of children: Not on file    Years of education: Not on file    Highest education level: Not on file   Occupational History    Not on file   Tobacco Use    Smoking status: Former Smoker     Packs/day: 0.50     Years: 20.00     Pack years: 10.00     Types: Cigarettes    Smokeless tobacco: Never Used    Tobacco comment: one pack lasts a week and a half   Substance and Sexual Activity    Alcohol use: Yes     Comment: couple drinks a day, liquor    Drug use: No    Sexual activity: Not on file   Other Topics Concern    Not on file   Social History Narrative    Not on file     Social Determinants of Health     Financial Resource Strain:     Difficulty of Paying Living Expenses:    Food Insecurity:     Worried About Running Out of Food in the Last Year:     Ran Out of Food in the Last Year:    Transportation Needs:     Lack of Transportation (Medical):      Lack of Transportation (Non-Medical):    Physical Activity:     Days of Exercise per Week:     Minutes of Exercise per Session:    Stress:     Feeling of Stress :    Social Connections:     Frequency of Communication with Friends and Family:     Frequency of Social Gatherings with Friends and Family:     Attends Orthodoxy Services:     Active Member of Clubs or Organizations:     Attends Club or Organization Meetings:     Marital Status:    Intimate Partner Violence:     Fear of Current or Ex-Partner:     Emotionally Abused:     Physically Abused:     Sexually Abused:      Past Medical History:   Diagnosis Date    Arthritis     Coronary artery disease (CAD) excluded     CABG x 4 in 1993, 5 stents after between 2000 and 2005    Enlarged prostate     Hyperlipidemia     Hypertension     Obesity     Positive cardiac stress test 2021    SOB (shortness of breath)     Wears partial dentures     upper     Past Surgical History:   Procedure Laterality Date    CARDIAC CATHETERIZATION  03/01/2021    CATARACT REMOVAL      COLONOSCOPY  3/12/2019    COLONOSCOPY CONTROL HEMORRHAGE performed by Lisa LAGUNAS Sarah Garcia MD at [facility]    COLONOSCOPY  3/12/2019    COLONOSCOPY POLYPECTOMY HOT BIOPSY performed by Dash Gutierres MD at [facility]    COLONOSCOPY N/A 10/20/2020    COLONOSCOPY WITH BIOPSY performed by Dash Gutierres MD at [facility]    COLONOSCOPY  10/20/2020    COLONOSCOPY POLYPECTOMY SNARE/COLD BIOPSY performed by Dash Gutierres MD at . [address]  [id number]    thinks he may have 4 or 5 stents    CORONARY ARTERY BYPASS GRAFT  1993    x4    EYE SURGERY      TOTAL KNEE ARTHROPLASTY Bilateral      Family History   Problem Relation Age of Onset    Heart Disease Father     Heart Disease Sister     Heart Disease Brother     Heart Disease Sister     Heart Disease Sister    Plan  An informed verbal consent for the procedure was obtained and risks including, but not limited to: allergy to medications, injection, bleeding, stiffness of joint, recurrence of symptoms, loss of function, swelling, drainage, irrigation, need for surgery and pseudo-septic inflammation, were explained to the patient. Also, discussed was the potential for further injections, irrigation and debridement and surgery. Alternate means of treatment have also been discussed with the patient.       Administrations This Visit     betamethasone acetate-betamethasone sodium phosphate (CELESTONE) injection 12 mg     Admin Date  11/04/2021  15:16 Action  Given Dose  12 mg Route  Intra-artICUlar Site   Administered By  Tash Moon MA    Ordering Provider: Calvin Antoine MD    NDC: 74001-593-75    Lot#: [id number]    : Ernesto Hardy    Patient Supplied?: No          bupivacaine (MARCAINE) 0.25 % injection 5 mg     Admin Date  11/04/2021  15:16 Action  Given Dose  5 mg Route  Intra-artICUlar Site   Administered By  Tash Moon MA    Ordering Provider: Calvin Antoine MD    NDC: 6877-5643-71    Lot#: [id number]    : [address]    Patient Supplied?: No          lidocaine 1 % injection 2 mL     Admin Date  11/04/2021  15:16 Action  Given Dose  2 mL Route  Intra-artICUlar Site   Administered By  Sathya Nichols MA    Ordering Provider: Ava Davenport MD    NDC: 8128-8494-31    Lot#: [id number]    : HIKMA    Patient Supplied?: No            Under sterile conditions the patient's right greater trochanter 20 maximal tenderness was injected with lidocaine 2 cc bupivacaine 2 cc and Celestone 2 cc. He tolerated procedure well    Patient will also have a sed rate as well as CRP ordered just to verify they were not dealing with an infectious process. We will have the patient check back here in the next 2 to 3 weeks for reevaluation to see how he reacts to the injection                    Provider Attestation:  Naveed Avelar, personally performed the services described in this documentation. All medical record entries made by the scribe were at my direction and in my presence. I have reviewed the chart and discharge instructions and agree that the records reflect my personal performance and is accurate and complete. Ava Davenport MD. 11/04/21      Please note that this chart was generated using voice recognition Dragon dictation software. Although every effort was made to ensure the accuracy of this automated transcription, some errors in transcription may have occurred.

## 2022-03-07 ENCOUNTER — HOSPITAL ENCOUNTER (OUTPATIENT)
Age: 72
Setting detail: SPECIMEN
Discharge: HOME | End: 2022-03-07

## 2022-03-07 LAB
ALT SERPL-CCNC: 14 U/L (ref 5–41)
AST SERPL-CCNC: 18 U/L
CHOLESTEROL, FASTING: 106 MG/DL
CHOLESTEROL/HDL RATIO: 2.7
HDLC SERPL-MCNC: 40 MG/DL
LDL CHOLESTEROL: 46 MG/DL (ref 0–130)
TRIGLYCERIDE, FASTING: 102 MG/DL

## 2022-05-27 ENCOUNTER — HOSPITAL ENCOUNTER (OUTPATIENT)
Age: 72
Setting detail: SPECIMEN
Discharge: HOME | End: 2022-05-27

## 2022-05-27 LAB — PROSTATE SPECIFIC ANTIGEN: 1.7 NG/ML

## 2022-07-12 ENCOUNTER — TELEPHONE (OUTPATIENT)
Dept: PHARMACY | Facility: CLINIC | Age: 72
End: 2022-07-12

## 2022-07-12 NOTE — TELEPHONE ENCOUNTER
[facility] [place] [facility]: ADHERENCE REVIEW  Identified care gap per [employer]: fills at [facility]: Statin adherence    Last Visit: unknown outside [address] [place] [address] Records claims through 6.12.22 YTD Luciano Ortiz = Filled only once; Potential Fail Date: 7/23/22):   SIMVASTATIN  TAB 40MG last filled on 2/20/22 for 90 day supply. Next refill due: 5/21/22    Per [facility] [facility]:  Same as above. Per [address]:   Not contacted    Lab Results   Component Value Date    CHOL 148 09/26/2016    TRIG 144 09/26/2016    HDL 40 (L) 03/07/2022    LDLCHOLESTEROL 46 03/07/2022     ALT   Date Value Ref Range Status   03/07/2022 14 5 - 41 U/L Final     AST   Date Value Ref Range Status   03/07/2022 18 <40 U/L Final     The ASCVD Risk score (Arnaldo Huitron., et al., 2013) failed to calculate for the following reasons: The systolic blood pressure is missing    The valid total cholesterol range is 130 to 320 mg/dL     PLAN  The following are interventions that have been identified:   - Patient overdue refilling SIMVASTATIN  TAB 40MG and active on home medication list.     Attempting to reach patient to review.  Left message asking for return call. No future appointments. Maribel Quiroz CPhT.    [address] [facility] free: [phone]

## 2022-07-28 NOTE — TELEPHONE ENCOUNTER
Patient returned call, stating he is currently taking Simvastatin 40 mg daily, and picked up a 90 day refill from [facility] (on file) on 5/19/22, and the bottle has refills remaining.

## 2022-11-15 ENCOUNTER — HOSPITAL ENCOUNTER (EMERGENCY)
Age: 72
Discharge: HOME | End: 2022-11-16
Attending: EMERGENCY MEDICINE
Payer: MEDICARE

## 2022-11-15 ENCOUNTER — APPOINTMENT (OUTPATIENT)
Dept: GENERAL RADIOLOGY | Age: 72
End: 2022-11-15
Payer: MEDICARE

## 2022-11-15 ENCOUNTER — TELEPHONE (OUTPATIENT)
Dept: PRIMARY CARE CLINIC | Age: 72
End: 2022-11-15

## 2022-11-15 ENCOUNTER — HOSPITAL ENCOUNTER (OUTPATIENT)
Age: 72
Setting detail: SPECIMEN
Discharge: HOME | End: 2022-11-15

## 2022-11-15 DIAGNOSIS — E87.6 HYPOKALEMIA: Primary | ICD-10-CM

## 2022-11-15 DIAGNOSIS — M19.041 OSTEOARTHRITIS OF RIGHT HAND, UNSPECIFIED OSTEOARTHRITIS TYPE: ICD-10-CM

## 2022-11-15 LAB
ANION GAP SERPL CALCULATED.3IONS-SCNC: 10 MMOL/L (ref 9–17)
BUN BLDV-MCNC: 19 MG/DL (ref 8–23)
CALCIUM SERPL-MCNC: 9.3 MG/DL (ref 8.6–10.4)
CHLORIDE BLD-SCNC: 101 MMOL/L (ref 98–107)
CO2: 35 MMOL/L (ref 20–31)
CREAT SERPL-MCNC: 0.72 MG/DL (ref 0.7–1.2)
GFR SERPL CREATININE-BSD FRML MDRD: >60 ML/MIN/1.73M2
GLUCOSE BLD-MCNC: 106 MG/DL (ref 70–99)
POTASSIUM SERPL-SCNC: 2.7 MMOL/L (ref 3.7–5.3)
POTASSIUM SERPL-SCNC: 2.8 MMOL/L (ref 3.7–5.3)
POTASSIUM SERPL-SCNC: 2.9 MMOL/L (ref 3.7–5.3)
POTASSIUM SERPL-SCNC: 2.9 MMOL/L (ref 3.7–5.3)
SODIUM BLD-SCNC: 146 MMOL/L (ref 135–144)

## 2022-11-15 PROCEDURE — 6360000002 HC RX W HCPCS: Performed by: EMERGENCY MEDICINE

## 2022-11-15 PROCEDURE — 96365 THER/PROPH/DIAG IV INF INIT: CPT

## 2022-11-15 PROCEDURE — 96366 THER/PROPH/DIAG IV INF ADDON: CPT

## 2022-11-15 PROCEDURE — 36415 COLL VENOUS BLD VENIPUNCTURE: CPT

## 2022-11-15 PROCEDURE — 96367 TX/PROPH/DG ADDL SEQ IV INF: CPT

## 2022-11-15 PROCEDURE — 6370000000 HC RX 637 (ALT 250 FOR IP): Performed by: EMERGENCY MEDICINE

## 2022-11-15 PROCEDURE — 99285 EMERGENCY DEPT VISIT HI MDM: CPT

## 2022-11-15 PROCEDURE — 93005 ELECTROCARDIOGRAM TRACING: CPT | Performed by: EMERGENCY MEDICINE

## 2022-11-15 PROCEDURE — 73130 X-RAY EXAM OF HAND: CPT

## 2022-11-15 PROCEDURE — 84132 ASSAY OF SERUM POTASSIUM: CPT

## 2022-11-15 RX ORDER — POTASSIUM CHLORIDE 7.45 MG/ML
10 INJECTION INTRAVENOUS
Status: DISPENSED | OUTPATIENT
Start: 2022-11-15 | End: 2022-11-16

## 2022-11-15 RX ORDER — POTASSIUM CHLORIDE 20 MEQ/1
40 TABLET, EXTENDED RELEASE ORAL ONCE
Status: COMPLETED | OUTPATIENT
Start: 2022-11-15 | End: 2022-11-15

## 2022-11-15 RX ORDER — MAGNESIUM SULFATE 1 G/100ML
1000 INJECTION INTRAVENOUS ONCE
Status: COMPLETED | OUTPATIENT
Start: 2022-11-15 | End: 2022-11-15

## 2022-11-15 RX ORDER — POTASSIUM CHLORIDE 7.45 MG/ML
10 INJECTION INTRAVENOUS ONCE
Status: COMPLETED | OUTPATIENT
Start: 2022-11-15 | End: 2022-11-15

## 2022-11-15 RX ADMIN — MAGNESIUM SULFATE HEPTAHYDRATE 1000 MG: 1 INJECTION, SOLUTION INTRAVENOUS at 20:59

## 2022-11-15 RX ADMIN — POTASSIUM CHLORIDE 40 MEQ: 1500 TABLET, EXTENDED RELEASE ORAL at 23:52

## 2022-11-15 RX ADMIN — POTASSIUM CHLORIDE 10 MEQ: 7.46 INJECTION, SOLUTION INTRAVENOUS at 19:04

## 2022-11-15 RX ADMIN — POTASSIUM CHLORIDE 40 MEQ: 1500 TABLET, EXTENDED RELEASE ORAL at 18:54

## 2022-11-15 RX ADMIN — POTASSIUM CHLORIDE 10 MEQ: 7.46 INJECTION, SOLUTION INTRAVENOUS at 23:51

## 2022-11-15 RX ADMIN — MAGNESIUM SULFATE HEPTAHYDRATE 1000 MG: 1 INJECTION, SOLUTION INTRAVENOUS at 23:13

## 2022-11-15 RX ADMIN — POTASSIUM CHLORIDE 40 MEQ: 1500 TABLET, EXTENDED RELEASE ORAL at 20:53

## 2022-11-15 ASSESSMENT — PAIN SCALES - GENERAL: PAINLEVEL_OUTOF10: 7

## 2022-11-15 ASSESSMENT — PAIN DESCRIPTION - DESCRIPTORS: DESCRIPTORS: DISCOMFORT

## 2022-11-15 ASSESSMENT — PAIN DESCRIPTION - LOCATION: LOCATION: HAND

## 2022-11-15 ASSESSMENT — PAIN - FUNCTIONAL ASSESSMENT: PAIN_FUNCTIONAL_ASSESSMENT: 0-10

## 2022-11-15 ASSESSMENT — PAIN DESCRIPTION - PAIN TYPE: TYPE: ACUTE PAIN

## 2022-11-15 ASSESSMENT — PAIN DESCRIPTION - ORIENTATION: ORIENTATION: RIGHT

## 2022-11-15 NOTE — ED PROVIDER NOTES
[facility] Emergency Department  [address] [address],[facility] [address] [facility] [address]  Phone: [phone]  Fax: Guerita Singh [phone]      Pt Name: Cristian Arango  MRN: [id number]  [place] 1950  Date of evaluation: 11/15/2022    CHIEF COMPLAINT       Chief Complaint   Patient presents with    Abnormal Lab     Labs drawn this morning-called back for a low K+ 2.8   Pt does not report any symptoms    Hand Pain     Pt has swelling of right hand above pointer & middle finger       HISTORY OF PRESENT ILLNESS    Cristian Arango is a 81 y.o. male who presents to the emergency department sent by his doctor for abnormal lab. Found to be hypokalemic and outpatient labs that were done today. Potassium of 2.8. He denies any chest pain or shortness of breath. No paresthesias or weakness. Also complaining of right hand pain over the second metacarpal phalangeal joint where there is swelling. He denies injury. He is right-handed. No other symptoms. REVIEW OF SYSTEMS       Constitutional: No fevers or chills   HEENT: No sore throat, rhinorrhea, or earache   Eyes: No blurry vision or double vision no drainage   Cardiovascular: No chest pain or tachycardia   Respiratory: No wheezing or shortness of breath no cough   Gastrointestinal: No nausea, vomiting, diarrhea, constipation, or abdominal pain   : No hematuria or dysuria   Musculoskeletal: Right hand swelling and pain  Skin: No rash   Neurological: No focal neurologic complaints, paresthesias, weakness, or headache     PAST MEDICAL HISTORY    has a past medical history of Arthritis, Coronary artery disease (CAD) excluded, Enlarged prostate, Hyperlipidemia, Hypertension, Obesity, Positive cardiac stress test, SOB (shortness of breath), and Wears partial dentures. SURGICAL HISTORY      has a past surgical history that includes Total knee arthroplasty (Bilateral); Cataract removal; Colonoscopy (3/12/2019);  Colonoscopy (3/12/2019); Coronary artery bypass graft (); Coronary angioplasty with stent ([id number]); Colonoscopy (N/A, 10/20/2020); Colonoscopy (10/20/2020); eye surgery; and Cardiac catheterization (2021). CURRENT MEDICATIONS       Previous Medications    AMLODIPINE (NORVASC) 10 MG TABLET    Take 10 mg by mouth daily. ASPIRIN (ASPIRIN CHILDRENS) 81 MG CHEWABLE TABLET    Take 1 tablet by mouth daily    CLOPIDOGREL (PLAVIX) 75 MG TABLET    Take 1 tablet by mouth daily    FUROSEMIDE (LASIX) 40 MG TABLET    Take 1 tablet by mouth daily    ISOSORBIDE MONONITRATE (IMDUR) 30 MG CR TABLET    Take 30 mg by mouth daily. LISINOPRIL (PRINIVIL;ZESTRIL) 10 MG TABLET    Take 10 mg by mouth daily. METOPROLOL (TOPROL-XL) 25 MG XL TABLET    Take 25 mg by mouth. 1/2 tab daily    NITROGLYCERIN (NITROSTAT) 0.4 MG SL TABLET    Place 1 tablet under the tongue every 5 minutes as needed for Chest pain    SIMVASTATIN (ZOCOR) 40 MG TABLET    Take 40 mg by mouth nightly. TAMSULOSIN (FLOMAX) 0.4 MG CAPSULE    Take 0.4 mg by mouth daily. ALLERGIES     has No Known Allergies. FAMILY HISTORY     He indicated that his mother is . He indicated that his father is . He indicated that all of his three sisters are alive. He indicated that only one of his two brothers is alive. family history includes Heart Disease in his brother, father, sister, sister, and sister. SOCIAL HISTORY      reports that he has quit smoking. His smoking use included cigarettes. He has a 10.00 pack-year smoking history. He has never used smokeless tobacco. He reports current alcohol use. He reports that he does not use drugs.     PHYSICAL EXAM       ED Triage Vitals [11/15/22 1810]   BP Temp Temp Source Heart Rate Resp SpO2 Height Weight   (!) 198/87 97.9 °F (36.6 °C) Oral 61 16 94 % 5' 10\" (1.778 m) 140 lb (63.5 kg)       Constitutional: Alert, oriented x3, nontoxic, answering questions appropriately, acting properly for age, in no acute distress   HEENT: Extraocular muscles intact,  Neck: Trachea midline   Cardiovascular: Regular rhythm and rate no murmurs   Respiratory: Clear to auscultation bilaterally no wheezes, rhonchi, rales, no respiratory distress no tachypnea no retractions no hypoxia  Gastrointestinal: Soft, nontender, nondistended, positive bowel sounds. No rebound, rigidity, or guarding. Musculoskeletal: Right upper extremity no pain at the shoulder elbow or. Radial ulnar arteries intact and capillary refill less than 2 seconds. There is swelling over the second metacarpal phalangeal joint with some mild erythema no increased warmth. Mild tenderness. No deformity. Neurologic: Moving all 4 extremities without difficulty there are no gross focal neurologic deficits   Skin: Warm and dry       DIFFERENTIAL DIAGNOSIS/ MDM:     IV potassium supplementation EKG x-ray right hand. DIAGNOSTIC RESULTS     EKG: All EKG's are interpreted by the Emergency Department Physician who either signs or Co-signs this chart in the absence of a cardiologist.    1830 sinus rhythm rate 50 first-degree AV block    no acute ST or T wave changes. Not indicated unless otherwise documented above    LABS:  No results found for this visit on 11/15/22.     Not indicated unless otherwise documented above    RADIOLOGY:   I reviewed the radiologist interpretations:    No orders to display       Not indicated unless otherwise documented above    EMERGENCY DEPARTMENT COURSE:     The patient was given the following medications:  Orders Placed This Encounter   Medications    potassium chloride (KLOR-CON M) extended release tablet 40 mEq    potassium chloride 10 mEq/100 mL IVPB (Peripheral Line)        Vitals:   -------------------------  BP (!) 198/87   Pulse 61   Temp 97.9 °F (36.6 °C) (Oral)   Resp 16   Ht 5' 10\" (1.778 m)   Wt 63.5 kg (140 lb)   SpO2 94%   BMI 20.09 kg/m²     7 PM care transferred to Dr. Clemencia Prakash    (Please note that portions of this note were completed with a voice recognition program.  Efforts were made to edit the dictations but occasionally words are mis-transcribed.)    Grace Limon DO   Attending Emergency Physician     Grace Limon [place]  11/15/22 1837

## 2022-11-15 NOTE — TELEPHONE ENCOUNTER
The writer received call for NT from [facility] ([facility]) for the patient for right index finger pain with limited movement and swollen x 2 days     Patient transferred to the writer, he states that the first knuckle of his right index finger is very swollen but all the other fingers are fine. Patient scheduled with Antohny Hernandez CNP for further evaluation.

## 2022-11-16 ENCOUNTER — HOSPITAL ENCOUNTER (OUTPATIENT)
Age: 72
Setting detail: SPECIMEN
Discharge: HOME | End: 2022-11-16

## 2022-11-16 ENCOUNTER — OFFICE VISIT (OUTPATIENT)
Dept: PRIMARY CARE CLINIC | Age: 72
End: 2022-11-16
Payer: MEDICARE

## 2022-11-16 VITALS
OXYGEN SATURATION: 93 % | WEIGHT: 251 LBS | DIASTOLIC BLOOD PRESSURE: 76 MMHG | HEART RATE: 57 BPM | SYSTOLIC BLOOD PRESSURE: 126 MMHG | BODY MASS INDEX: 36.01 KG/M2

## 2022-11-16 VITALS
SYSTOLIC BLOOD PRESSURE: 165 MMHG | WEIGHT: 140 LBS | RESPIRATION RATE: 18 BRPM | HEART RATE: 51 BPM | OXYGEN SATURATION: 93 % | BODY MASS INDEX: 20.04 KG/M2 | TEMPERATURE: 97.9 F | HEIGHT: 70 IN | DIASTOLIC BLOOD PRESSURE: 77 MMHG

## 2022-11-16 DIAGNOSIS — R73.09 ELEVATED GLUCOSE: ICD-10-CM

## 2022-11-16 DIAGNOSIS — E87.6 HYPOKALEMIA: ICD-10-CM

## 2022-11-16 DIAGNOSIS — M79.641 RIGHT HAND PAIN: Primary | ICD-10-CM

## 2022-11-16 DIAGNOSIS — M79.641 RIGHT HAND PAIN: ICD-10-CM

## 2022-11-16 LAB
ALBUMIN SERPL-MCNC: 4 G/DL (ref 3.5–5.2)
ALBUMIN/GLOBULIN RATIO: 1.3 (ref 1–2.5)
ALP BLD-CCNC: 124 U/L (ref 40–129)
ALT SERPL-CCNC: 16 U/L (ref 5–41)
ANION GAP SERPL CALCULATED.3IONS-SCNC: 12 MMOL/L (ref 9–17)
AST SERPL-CCNC: 25 U/L
BILIRUB SERPL-MCNC: 0.9 MG/DL (ref 0.3–1.2)
BUN BLDV-MCNC: 17 MG/DL (ref 8–23)
CALCIUM SERPL-MCNC: 8.8 MG/DL (ref 8.6–10.4)
CHLORIDE BLD-SCNC: 107 MMOL/L (ref 98–107)
CO2: 31 MMOL/L (ref 20–31)
CREAT SERPL-MCNC: 0.77 MG/DL (ref 0.7–1.2)
EKG ATRIAL RATE: 50 BPM
EKG P AXIS: 53 DEGREES
EKG P-R INTERVAL: 220 MS
EKG Q-T INTERVAL: 480 MS
EKG QRS DURATION: 104 MS
EKG QTC CALCULATION (BAZETT): 437 MS
EKG R AXIS: 46 DEGREES
EKG T AXIS: -117 DEGREES
EKG VENTRICULAR RATE: 50 BPM
ESTIMATED AVERAGE GLUCOSE: 103 MG/DL
GFR SERPL CREATININE-BSD FRML MDRD: >60 ML/MIN/1.73M2
GLUCOSE BLD-MCNC: 100 MG/DL (ref 70–99)
HBA1C MFR BLD: 5.2 % (ref 4–6)
POTASSIUM SERPL-SCNC: 3.1 MMOL/L (ref 3.7–5.3)
POTASSIUM SERPL-SCNC: 3.5 MMOL/L (ref 3.7–5.3)
SODIUM BLD-SCNC: 150 MMOL/L (ref 135–144)
TOTAL PROTEIN: 7.1 G/DL (ref 6.4–8.3)
URIC ACID: 7.7 MG/DL (ref 3.4–7)

## 2022-11-16 PROCEDURE — 1123F ACP DISCUSS/DSCN MKR DOCD: CPT | Performed by: NURSE PRACTITIONER

## 2022-11-16 PROCEDURE — 84132 ASSAY OF SERUM POTASSIUM: CPT

## 2022-11-16 PROCEDURE — 99214 OFFICE O/P EST MOD 30 MIN: CPT | Performed by: NURSE PRACTITIONER

## 2022-11-16 PROCEDURE — 3074F SYST BP LT 130 MM HG: CPT | Performed by: NURSE PRACTITIONER

## 2022-11-16 PROCEDURE — 36415 COLL VENOUS BLD VENIPUNCTURE: CPT

## 2022-11-16 PROCEDURE — 3078F DIAST BP <80 MM HG: CPT | Performed by: NURSE PRACTITIONER

## 2022-11-16 RX ORDER — POTASSIUM CHLORIDE 20 MEQ/1
40 TABLET, EXTENDED RELEASE ORAL DAILY
Qty: 14 TABLET | Refills: 0 | Status: SHIPPED | OUTPATIENT
Start: 2022-11-16 | End: 2022-11-23

## 2022-11-16 RX ORDER — PREDNISONE 20 MG/1
40 TABLET ORAL DAILY
Qty: 10 TABLET | Refills: 0 | Status: SHIPPED | OUTPATIENT
Start: 2022-11-16 | End: 2022-11-21

## 2022-11-16 SDOH — ECONOMIC STABILITY: FOOD INSECURITY: WITHIN THE PAST 12 MONTHS, THE FOOD YOU BOUGHT JUST DIDN'T LAST AND YOU DIDN'T HAVE MONEY TO GET MORE.: NEVER TRUE

## 2022-11-16 SDOH — ECONOMIC STABILITY: FOOD INSECURITY: WITHIN THE PAST 12 MONTHS, YOU WORRIED THAT YOUR FOOD WOULD RUN OUT BEFORE YOU GOT MONEY TO BUY MORE.: NEVER TRUE

## 2022-11-16 ASSESSMENT — PATIENT HEALTH QUESTIONNAIRE - PHQ9
SUM OF ALL RESPONSES TO PHQ QUESTIONS 1-9: 0
2. FEELING DOWN, DEPRESSED OR HOPELESS: 0
SUM OF ALL RESPONSES TO PHQ QUESTIONS 1-9: 0
1. LITTLE INTEREST OR PLEASURE IN DOING THINGS: 0
SUM OF ALL RESPONSES TO PHQ QUESTIONS 1-9: 0
SUM OF ALL RESPONSES TO PHQ QUESTIONS 1-9: 0
SUM OF ALL RESPONSES TO PHQ9 QUESTIONS 1 & 2: 0

## 2022-11-16 ASSESSMENT — ENCOUNTER SYMPTOMS
ABDOMINAL PAIN: 0
BACK PAIN: 0
COUGH: 0
SHORTNESS OF BREATH: 0

## 2022-11-16 ASSESSMENT — SOCIAL DETERMINANTS OF HEALTH (SDOH): HOW HARD IS IT FOR YOU TO PAY FOR THE VERY BASICS LIKE FOOD, HOUSING, MEDICAL CARE, AND HEATING?: NOT HARD AT ALL

## 2022-11-16 NOTE — PROGRESS NOTES
[address] PRIMARY CARE  [address] [facility]  [address] [address]  Dept: [phone]  Dept Fax: [phone]    Tutu Troncoso is a 81 y.o. male who presentstoday for his medical conditions/complaints as noted below. Tutu Troncoso is c/o of  Chief Complaint   Patient presents with    Finger Pain     R 1st digit pain/swelling x2 days. No bruising/discoloration. Denies injury.  Imaging done 11/15           HPI:     Presents for acute visit/ER follow up  PCP- Dr. Chio Ogden  BP well controlled    C/o right hand 2nd digit pain x 2 days  Right hand dominant  Denies any mechanism of injury  Loss of  strength  Incidental ER visit yesterday, xray right hand WNL    Follows with Dr. Aubree Carter for cardiology  Was notified of low potassium level and advised to ER  Was given supplements and discharged home  Given rx for oral potassium replacement, he needs to pick this up today  Does not have orders to repeat labs    Denies any other problems/concerns      Hemoglobin A1C (%)   Date Value   2019 5.4             ( goal A1C is < 7)   No results found for: LABMICR  LDL Cholesterol (mg/dL)   Date Value   2022 46   2019 65   02/15/2018 70       (goal LDL is <100)   AST (U/L)   Date Value   2022 18     ALT (U/L)   Date Value   2022 14     BUN (mg/dL)   Date Value   11/15/2022 19     BP Readings from Last 3 Encounters:   22 126/76   22 (!) 165/77   21 129/72          ([id number])    Past Medical History:   Diagnosis Date    Arthritis     Coronary artery disease (CAD) excluded     CABG x 4 in , 5 stents after between  and     Enlarged prostate     Hyperlipidemia     Hypertension     Obesity     Positive cardiac stress test     SOB (shortness of breath)     Wears partial dentures     upper      Past Surgical History:   Procedure Laterality Date    CARDIAC CATHETERIZATION  2021    CATARACT REMOVAL      COLONOSCOPY  3/12/2019    COLONOSCOPY CONTROL HEMORRHAGE performed by Rachana Garcia MD at [facility]    COLONOSCOPY  3/12/2019    COLONOSCOPY POLYPECTOMY HOT BIOPSY performed by Rachana Garcia MD at [facility]    COLONOSCOPY N/A 10/20/2020    COLONOSCOPY WITH BIOPSY performed by Rachana Garcia MD at [facility]    COLONOSCOPY  10/20/2020    COLONOSCOPY POLYPECTOMY SNARE/COLD BIOPSY performed by Rachana Garcia MD at [address]  [id number]    thinks he may have 4 or 5 stents    CORONARY ARTERY BYPASS GRAFT  1993    x4    EYE SURGERY      TOTAL KNEE ARTHROPLASTY Bilateral        Family History   Problem Relation Age of Onset    Heart Disease Father     Heart Disease Sister     Heart Disease Brother     Heart Disease Sister     Heart Disease Sister           Social History     Tobacco Use    Smoking status: Former     Packs/day: 0.50     Years: 20.00     Pack years: 10.00     Types: Cigarettes    Smokeless tobacco: Never    Tobacco comments:     one pack lasts a week and a half   Substance Use Topics    Alcohol use: Yes     Comment: couple drinks a day, liquor      Current Outpatient Medications   Medication Sig Dispense Refill    potassium chloride (KLOR-CON M) 20 MEQ extended release tablet Take 2 tablets by mouth daily for 7 days 14 tablet 0    predniSONE (DELTASONE) 20 MG tablet Take 2 tablets by mouth daily for 5 days 10 tablet 0    aspirin (ASPIRIN CHILDRENS) 81 MG chewable tablet Take 1 tablet by mouth daily 30 tablet 3    clopidogrel (PLAVIX) 75 MG tablet Take 1 tablet by mouth daily 30 tablet 3    nitroGLYCERIN (NITROSTAT) 0.4 MG SL tablet Place 1 tablet under the tongue every 5 minutes as needed for Chest pain 25 tablet 3    metoprolol (TOPROL-XL) 25 MG XL tablet Take 25 mg by mouth. 1/2 tab daily      isosorbide mononitrate (IMDUR) 30 MG CR tablet Take 30 mg by mouth daily.       lisinopril (PRINIVIL;ZESTRIL) 10 MG tablet Take 10 mg by mouth daily.  amLODIPine (NORVASC) 10 MG tablet Take 10 mg by mouth daily.  simvastatin (ZOCOR) 40 MG tablet Take 40 mg by mouth nightly.  tamsulosin (FLOMAX) 0.4 MG capsule Take 0.4 mg by mouth daily.  furosemide (LASIX) 40 MG tablet Take 1 tablet by mouth daily 60 tablet 3     No current facility-administered medications for this visit. No Known Allergies    Health Maintenance   Topic Date Due    Depression Screen  Never done    Hepatitis C screen  Never done    Pneumococcal 65+ years Vaccine (1 - PCV) Never done   [employer] Visit (AWV)  10/10/2020    Diabetes screen  02/11/2022    DTaP/Tdap/Td vaccine (1 - Tdap) 12/07/2022 (Originally 6/2/2011)    Lipids  03/07/2023    Colorectal Cancer Screen  10/20/2030    Flu vaccine  Completed    Shingles vaccine  Completed    COVID-19 Vaccine  Completed    AAA screen  Completed    Hepatitis A vaccine  Aged Out    Hib vaccine  Aged Out    Meningococcal (ACWY) vaccine  Aged Out       Subjective:      Review of Systems   Constitutional:  Negative for chills, fatigue and fever. HENT:  Negative for congestion. Eyes:  Negative for visual disturbance. Respiratory:  Negative for cough and shortness of breath. Cardiovascular:  Negative for chest pain and palpitations. Gastrointestinal:  Negative for abdominal pain. Genitourinary:  Negative for difficulty urinating and dysuria. Musculoskeletal:  Positive for arthralgias. Negative for back pain. Neurological:  Negative for dizziness and headaches. Psychiatric/Behavioral:  Negative for self-injury, sleep disturbance and suicidal ideas. The patient is not nervous/anxious. Objective:     Physical Exam  Vitals and nursing note reviewed. Constitutional:       Appearance: He is well-developed. HENT:      Head: Normocephalic and atraumatic. Eyes:      Pupils: Pupils are equal, round, and reactive to light. Cardiovascular:      Rate and Rhythm: Normal rate and regular rhythm. Heart sounds: Normal heart sounds. Pulmonary:      Effort: Pulmonary effort is normal.      Breath sounds: Normal breath sounds. Abdominal:      General: Bowel sounds are normal.      Palpations: Abdomen is soft. Tenderness: There is no abdominal tenderness. Musculoskeletal:         General: Normal range of motion. Cervical back: Normal range of motion. Skin:     General: Skin is warm and dry. Neurological:      Mental Status: He is alert and oriented to person, place, and time. Psychiatric:         Behavior: Behavior normal.         Thought Content: Thought content normal.         Judgment: Judgment normal.     /76   Pulse 57   Wt 251 lb (113.9 kg)   SpO2 93%   BMI 36.01 kg/m²     Assessment:       Diagnosis Orders   1. Right hand pain  predniSONE (DELTASONE) 20 MG tablet    Uric Acid      2. Hypokalemia  Comprehensive Metabolic Panel      3. Elevated glucose  Hemoglobin A1C                Plan:      Return in about 2 weeks (around 11/30/2022) for AWV. Right hand pain- xray WNL. Rx given for prednisone. Follow up with PCP in 2 weeks for recheck/earlier if needed. Hypokalemia/elevated glucose- Rx given for labs, follow up pending results. Orders Placed This Encounter   Procedures    Comprehensive Metabolic Panel     Standing Status:   Future     Standing Expiration Date:   11/16/2023    Hemoglobin A1C     Standing Status:   Future     Standing Expiration Date:   11/16/2023    Uric Acid     Standing Status:   Future     Standing Expiration Date:   11/16/2023        Orders Placed This Encounter   Medications    predniSONE (DELTASONE) 20 MG tablet     Sig: Take 2 tablets by mouth daily for 5 days     Dispense:  10 tablet     Refill:  0       Patient given educational materials - see patient instructions. Discussed use, benefit, and side effects of prescribed medications. All patientquestions answered. Pt voiced understanding. Reviewed health maintenance. Instructedto continue current medications, diet and exercise. Patient agreed with treatmentplan. Follow up as directed.      Electronicallysigned by ORAL Mcguire CNP on 11/16/2022 at 9:54 AM

## 2022-11-16 NOTE — DISCHARGE INSTRUCTIONS
Take your medication as indicated and prescribed. For pain use acetaminophen (Tylenol) or ibuprofen (Motrin / Advil), unless prescribed medications that have acetaminophen or ibuprofen (or similar medications) in it. You can take over the counter acetaminophen tablets (1 - 2 tablets of the 500-mg strength every 6 hours) or ibuprofen tablets (2 tablets every 4 hours). Use an ice pack or bag filled with ice and apply to the injured area 3 - 4 times a day for 15 - 20 minutes each time. If the injury is older than 3 days, then use a heating pad to help relax the muscles. PLEASE RETURN TO THE EMERGENCY DEPARTMENT IMMEDIATELY for worsening of pain, decrease sensation to arms or legs, inability to move arms or legs, shortness of breath, severe chest pain, excessive nausea or vomiting, notice any bruising to your abdomen or have increase in abdominal pain, or if you develop any concerning symptoms such as: high fever not relieved by acetaminophen (Tylenol) and/or ibuprofen (Motrin / Advil), chills, feeling of your heart fluttering or racing, persistent nausea and/or vomiting, vomiting up blood, blood in your stool, loss of consciousness, numbness, weakness or tingling in the arms or legs or change in color of the extremities, changes in mental status, persistent headache, blurry vision, loss of bladder / bowel control, unable to follow up with your physician, or other any other care or concern. 128

## 2022-11-16 NOTE — ED PROVIDER NOTES
[facility] Emergency Department  [address] [address] [facility] [address] [facility] [address]  Phone: [phone]  Fax: [phone]        ADDENDUM:      Care of this patient was assumed from Dr. Isabella Bennett. The patient was seen for Abnormal Lab (Labs drawn this morning-called back for a low K+ 2.8 /Pt does not report any symptoms) and Hand Pain (Pt has swelling of right hand above pointer & middle finger)  . The patient's initial evaluation and plan have been discussed with the prior provider who initially evaluated the patient. Nursing Notes, Past Medical Hx, Past Surgical Hx, Allergies, were all reviewed. PAST MEDICAL HISTORY    has a past medical history of Arthritis, Coronary artery disease (CAD) excluded, Enlarged prostate, Hyperlipidemia, Hypertension, Obesity, Positive cardiac stress test, SOB (shortness of breath), and Wears partial dentures. SURGICAL HISTORY      has a past surgical history that includes Total knee arthroplasty (Bilateral); Cataract removal; Colonoscopy (3/12/2019); Colonoscopy (3/12/2019); Coronary artery bypass graft (1993); Coronary angioplasty with stent (5323-5318); Colonoscopy (N/A, 10/20/2020); Colonoscopy (10/20/2020); eye surgery; and Cardiac catheterization (03/01/2021). CURRENT MEDICATIONS       Previous Medications    AMLODIPINE (NORVASC) 10 MG TABLET    Take 10 mg by mouth daily. ASPIRIN (ASPIRIN CHILDRENS) 81 MG CHEWABLE TABLET    Take 1 tablet by mouth daily    CLOPIDOGREL (PLAVIX) 75 MG TABLET    Take 1 tablet by mouth daily    FUROSEMIDE (LASIX) 40 MG TABLET    Take 1 tablet by mouth daily    ISOSORBIDE MONONITRATE (IMDUR) 30 MG CR TABLET    Take 30 mg by mouth daily. LISINOPRIL (PRINIVIL;ZESTRIL) 10 MG TABLET    Take 10 mg by mouth daily. METOPROLOL (TOPROL-XL) 25 MG XL TABLET    Take 25 mg by mouth.  1/2 tab daily    NITROGLYCERIN (NITROSTAT) 0.4 MG SL TABLET    Place 1 tablet under the tongue every 5 minutes as needed for Chest pain    SIMVASTATIN (ZOCOR) 40 MG TABLET    Take 40 mg by mouth nightly. TAMSULOSIN (FLOMAX) 0.4 MG CAPSULE    Take 0.4 mg by mouth daily. ALLERGIES     has No Known Allergies. Diagnostic Results     LABS:   Results for orders placed or performed during the hospital encounter of 11/15/22   Potassium   Result Value Ref Range    Potassium 2.7 (LL) 3.7 - 5.3 mmol/L   Potassium   Result Value Ref Range    Potassium 2.9 (LL) 3.7 - 5.3 mmol/L   Potassium   Result Value Ref Range    Potassium 2.9 (LL) 3.7 - 5.3 mmol/L   Potassium   Result Value Ref Range    Potassium 3.1 (L) 3.7 - 5.3 mmol/L       RADIOLOGY:  XR HAND RIGHT (MIN 3 VIEWS)   Final Result   No fracture or dislocation. Degenerative changes of the hand. RECENT VITALS:  BP: (!) 165/77, Temp: 97.9 °F (36.6 °C), Heart Rate: 51, Resp: 18     ED Course     The patient was given the following medications:  Orders Placed This Encounter   Medications    potassium chloride (KLOR-CON M) extended release tablet 40 mEq    potassium chloride 10 mEq/100 mL IVPB (Peripheral Line)    potassium chloride (KLOR-CON M) extended release tablet 40 mEq    magnesium sulfate 1000 mg in dextrose 5% 100 mL IVPB    magnesium sulfate 1000 mg in dextrose 5% 100 mL IVPB    potassium chloride (KLOR-CON M) extended release tablet 40 mEq    potassium chloride 10 mEq/100 mL IVPB (Peripheral Line)    potassium chloride (KLOR-CON M) 20 MEQ extended release tablet     Sig: Take 2 tablets by mouth daily for 7 days     Dispense:  14 tablet     Refill:  0       Medical Decision Making           The patient is a 82-year-old male who presents for evaluation of hypokalemia that was found on outpatient lab work. Patient also reports that he has had a dull, achy, nonradiating pain to his right hand. He denies any injury. X-ray of the right hand shows no fracture or dislocation. He does have degenerative changes of the hand and his hand pain is likely from osteoarthritis.   He is neurovascularly intact. The potassium was replaced initially with 10 mEq of IV potassium and 40 mEq of oral potassium. At time of signout plan to recheck the potassium and likely discharge. Repeat potassium has only mildly improved to 2.9. I subsequently ordered IV magnesium and more oral potassium. We rechecked the potassium again and it has not changed. I suspect this may have been because the magnesium was not given prior to the potassium being replaced initially and this may have delayed resorption of the potassium. I subsequently ordered another gram of magnesium followed by 20 mEq of IV potassium and 40 mEq of oral potassium. The potassium improved to 3.1. I feel that it is safe to send him home with this level. He was given oral potassium supplements to be given daily for the next week. The patient declines admission and is requesting discharge. He was instructed to take his medications as prescribed and was given information on potassium rich foods. He was instructed to follow-up with his PCP within 1 to 2 days and to return to the ER for worsening symptoms or any other concern. The patient understands that at this time there is no evidence for a more malignant underlying process, but also understands that early in the process of an illness or injury, an emergency department workup can be falsely reassuring. Routine discharge counseling was given, and the patient understands that worsening, changing or persistent symptoms should prompt an immediate call or follow up with their primary physician or return to the emergency department. The importance of appropriate follow up was also discussed. I have reviewed the disposition diagnosis with the patient. I have answered their questions and given discharge instructions. They voiced understanding of these instructions and did not have any further questions or complaints. Disposition     FINAL IMPRESSION      1. Hypokalemia    2.  Osteoarthritis of right hand, unspecified osteoarthritis type          DISPOSITION/PLAN   DISPOSITION Decision To Discharge 11/16/2022 01:36:49 AM      CONDITION ON DISPOSITION:   Stable    PATIENT REFERRED TO:  Ewa Dodd DO  [place] [address]  [phone]    Schedule an appointment as soon as possible for a visit in 2 days      [address] [facility] Emergency Department  [address] [place] [address]  [phone]  Go to   If symptoms worsen    DISCHARGE MEDICATIONS:  New Prescriptions    POTASSIUM CHLORIDE (KLOR-CON M) 20 MEQ EXTENDED RELEASE TABLET    Take 2 tablets by mouth daily for 7 days             (Please note that portions of this note were completed with a voice recognition program.  Efforts were made to edit the dictations but occasionally words are mis-transcribed.)    Yarelis Ervin DO  Emergency Medicine Physician                  Yarelis Ervin DO  11/16/22 0958

## 2022-12-09 SDOH — HEALTH STABILITY: PHYSICAL HEALTH: ON AVERAGE, HOW MANY MINUTES DO YOU ENGAGE IN EXERCISE AT THIS LEVEL?: 10 MIN

## 2022-12-09 SDOH — HEALTH STABILITY: PHYSICAL HEALTH: ON AVERAGE, HOW MANY DAYS PER WEEK DO YOU ENGAGE IN MODERATE TO STRENUOUS EXERCISE (LIKE A BRISK WALK)?: 1 DAY

## 2022-12-09 ASSESSMENT — PATIENT HEALTH QUESTIONNAIRE - PHQ9
SUM OF ALL RESPONSES TO PHQ QUESTIONS 1-9: 0
SUM OF ALL RESPONSES TO PHQ QUESTIONS 1-9: 0
1. LITTLE INTEREST OR PLEASURE IN DOING THINGS: 0
SUM OF ALL RESPONSES TO PHQ QUESTIONS 1-9: 0
SUM OF ALL RESPONSES TO PHQ QUESTIONS 1-9: 0
SUM OF ALL RESPONSES TO PHQ9 QUESTIONS 1 & 2: 0
2. FEELING DOWN, DEPRESSED OR HOPELESS: 0

## 2022-12-09 ASSESSMENT — LIFESTYLE VARIABLES
HOW OFTEN DURING THE LAST YEAR HAVE YOU BEEN UNABLE TO REMEMBER WHAT HAPPENED THE NIGHT BEFORE BECAUSE YOU HAD BEEN DRINKING: NEVER
HOW OFTEN DURING THE LAST YEAR HAVE YOU FAILED TO DO WHAT WAS NORMALLY EXPECTED FROM YOU BECAUSE OF DRINKING: NEVER
HOW MANY STANDARD DRINKS CONTAINING ALCOHOL DO YOU HAVE ON A TYPICAL DAY: 1 OR 2
HOW OFTEN DURING THE LAST YEAR HAVE YOU FAILED TO DO WHAT WAS NORMALLY EXPECTED FROM YOU BECAUSE OF DRINKING: 0
HOW OFTEN DO YOU HAVE SIX OR MORE DRINKS ON ONE OCCASION: 2
HOW OFTEN DURING THE LAST YEAR HAVE YOU NEEDED AN ALCOHOLIC DRINK FIRST THING IN THE MORNING TO GET YOURSELF GOING AFTER A NIGHT OF HEAVY DRINKING: NEVER
HOW OFTEN DURING THE LAST YEAR HAVE YOU FOUND THAT YOU WERE NOT ABLE TO STOP DRINKING ONCE YOU HAD STARTED: 0
HOW OFTEN DO YOU HAVE A DRINK CONTAINING ALCOHOL: 4 OR MORE TIMES A WEEK
HAS A RELATIVE, FRIEND, DOCTOR, OR ANOTHER HEALTH PROFESSIONAL EXPRESSED CONCERN ABOUT YOUR DRINKING OR SUGGESTED YOU CUT DOWN: 0
HOW OFTEN DURING THE LAST YEAR HAVE YOU FOUND THAT YOU WERE NOT ABLE TO STOP DRINKING ONCE YOU HAD STARTED: NEVER
HOW MANY STANDARD DRINKS CONTAINING ALCOHOL DO YOU HAVE ON A TYPICAL DAY: 1
HOW OFTEN DURING THE LAST YEAR HAVE YOU HAD A FEELING OF GUILT OR REMORSE AFTER DRINKING: NEVER
HAVE YOU OR SOMEONE ELSE BEEN INJURED AS A RESULT OF YOUR DRINKING: NO
HOW OFTEN DO YOU HAVE A DRINK CONTAINING ALCOHOL: 5
HOW OFTEN DURING THE LAST YEAR HAVE YOU BEEN UNABLE TO REMEMBER WHAT HAPPENED THE NIGHT BEFORE BECAUSE YOU HAD BEEN DRINKING: 0
HOW OFTEN DURING THE LAST YEAR HAVE YOU HAD A FEELING OF GUILT OR REMORSE AFTER DRINKING: 0
HOW OFTEN DURING THE LAST YEAR HAVE YOU NEEDED AN ALCOHOLIC DRINK FIRST THING IN THE MORNING TO GET YOURSELF GOING AFTER A NIGHT OF HEAVY DRINKING: 0
HAVE YOU OR SOMEONE ELSE BEEN INJURED AS A RESULT OF YOUR DRINKING: 0
HAS A RELATIVE, FRIEND, DOCTOR, OR ANOTHER HEALTH PROFESSIONAL EXPRESSED CONCERN ABOUT YOUR DRINKING OR SUGGESTED YOU CUT DOWN: NO

## 2022-12-12 ENCOUNTER — HOSPITAL ENCOUNTER (OUTPATIENT)
Age: 72
Setting detail: SPECIMEN
Discharge: HOME | End: 2022-12-12

## 2022-12-12 ENCOUNTER — OFFICE VISIT (OUTPATIENT)
Dept: PRIMARY CARE CLINIC | Age: 72
End: 2022-12-12
Payer: MEDICARE

## 2022-12-12 VITALS
HEART RATE: 61 BPM | SYSTOLIC BLOOD PRESSURE: 120 MMHG | WEIGHT: 243 LBS | DIASTOLIC BLOOD PRESSURE: 82 MMHG | BODY MASS INDEX: 34.87 KG/M2 | OXYGEN SATURATION: 96 %

## 2022-12-12 DIAGNOSIS — E87.0 HYPERNATREMIA: ICD-10-CM

## 2022-12-12 DIAGNOSIS — E87.6 LOW BLOOD POTASSIUM: ICD-10-CM

## 2022-12-12 DIAGNOSIS — Z00.00 MEDICARE ANNUAL WELLNESS VISIT, SUBSEQUENT: Primary | ICD-10-CM

## 2022-12-12 DIAGNOSIS — I10 PRIMARY HYPERTENSION: ICD-10-CM

## 2022-12-12 LAB
ABSOLUTE EOS #: 0.22 K/UL (ref 0–0.44)
ABSOLUTE IMMATURE GRANULOCYTE: <0.03 K/UL (ref 0–0.3)
ABSOLUTE LYMPH #: 1.92 K/UL (ref 1.1–3.7)
ABSOLUTE MONO #: 0.48 K/UL (ref 0.1–1.2)
ALBUMIN SERPL-MCNC: 4.2 G/DL (ref 3.5–5.2)
ALBUMIN/GLOBULIN RATIO: 1.2 (ref 1–2.5)
ALP BLD-CCNC: 124 U/L (ref 40–129)
ALT SERPL-CCNC: 24 U/L (ref 5–41)
ANION GAP SERPL CALCULATED.3IONS-SCNC: 13 MMOL/L (ref 9–17)
AST SERPL-CCNC: 32 U/L
BASOPHILS # BLD: 1 % (ref 0–2)
BASOPHILS ABSOLUTE: 0.06 K/UL (ref 0–0.2)
BILIRUB SERPL-MCNC: 0.8 MG/DL (ref 0.3–1.2)
BUN BLDV-MCNC: 15 MG/DL (ref 8–23)
CALCIUM SERPL-MCNC: 9.3 MG/DL (ref 8.6–10.4)
CHLORIDE BLD-SCNC: 97 MMOL/L (ref 98–107)
CO2: 33 MMOL/L (ref 20–31)
CREAT SERPL-MCNC: 0.91 MG/DL (ref 0.7–1.2)
EOSINOPHILS RELATIVE PERCENT: 3 % (ref 1–4)
GFR SERPL CREATININE-BSD FRML MDRD: >60 ML/MIN/1.73M2
GLUCOSE BLD-MCNC: 96 MG/DL (ref 70–99)
HCT VFR BLD CALC: 43 % (ref 40.7–50.3)
HEMOGLOBIN: 13.7 G/DL (ref 13–17)
IMMATURE GRANULOCYTES: 0 %
LYMPHOCYTES # BLD: 28 % (ref 24–43)
MCH RBC QN AUTO: 32.4 PG (ref 25.2–33.5)
MCHC RBC AUTO-ENTMCNC: 31.9 G/DL (ref 28.4–34.8)
MCV RBC AUTO: 101.7 FL (ref 82.6–102.9)
MONOCYTES # BLD: 7 % (ref 3–12)
NRBC AUTOMATED: 0 PER 100 WBC
PDW BLD-RTO: 14.3 % (ref 11.8–14.4)
PLATELET # BLD: 135 K/UL (ref 138–453)
PMV BLD AUTO: 9.6 FL (ref 8.1–13.5)
POTASSIUM SERPL-SCNC: 3.3 MMOL/L (ref 3.7–5.3)
RBC # BLD: 4.23 M/UL (ref 4.21–5.77)
SEG NEUTROPHILS: 61 % (ref 36–65)
SEGMENTED NEUTROPHILS ABSOLUTE COUNT: 4.27 K/UL (ref 1.5–8.1)
SODIUM BLD-SCNC: 143 MMOL/L (ref 135–144)
TOTAL PROTEIN: 7.7 G/DL (ref 6.4–8.3)
WBC # BLD: 7 K/UL (ref 3.5–11.3)

## 2022-12-12 PROCEDURE — 3074F SYST BP LT 130 MM HG: CPT | Performed by: FAMILY MEDICINE

## 2022-12-12 PROCEDURE — 3078F DIAST BP <80 MM HG: CPT | Performed by: FAMILY MEDICINE

## 2022-12-12 PROCEDURE — G0439 PPPS, SUBSEQ VISIT: HCPCS | Performed by: FAMILY MEDICINE

## 2022-12-12 PROCEDURE — 1123F ACP DISCUSS/DSCN MKR DOCD: CPT | Performed by: FAMILY MEDICINE

## 2022-12-12 NOTE — PROGRESS NOTES
Medicare Annual Wellness Visit    Hal Goldsmith is here for Medicare AWV    Assessment & Plan   Medicare annual wellness visit, subsequent  Hypernatremia  -     Comprehensive Metabolic Panel; Future  Low blood potassium  -     Comprehensive Metabolic Panel; Future  Primary hypertension  -     CBC with Auto Differential; Future    Recommendations for Preventive Services Due: see orders and patient instructions/AVS.  Recommended screening schedule for the next 5-10 years is provided to the patient in written form: see Patient Instructions/AVS.     Return in about 1 year (around 12/12/2023) for medicare wellness. Subjective       Patient's complete Health Risk Assessment and screening values have been reviewed and are found in Flowsheets. The following problems were reviewed today and where indicated follow up appointments were made and/or referrals ordered. In November had swelling of R index finger and was seen in ER and then walk in. Improved with prednisone, now just feels sore at times but no swelling or redness. Takes tylenol or ibuprofen which helps when needed. Pt did have hypokalemia so we will recheck. Follows with Dr. Azul Ulloa , hx of CABG and stents. Denies any chest pain or shortness of breath    Positive Risk Factor Screenings with Interventions:         Alcohol Screening:  Alcohol Use: Heavy Drinker    Frequency of Alcohol Consumption: 4 or more times a week    Average Number of Drinks: 1 or 2    Frequency of Binge Drinking: Less than monthly      AUDIT-C Score: 5   AUDIT Total Score: 5    Interpretation of AUDIT-C score:   3-7 indicates potential alcohol risk. 8 or more is associated with harmful or hazardous drinking. 15 or more in women, and 15 or more in men, is likely to indicate alcohol dependence. Interventions:                  Weight and Activity:  Physical Activity: Insufficiently Active    Days of Exercise per Week: 1 day    Minutes of Exercise per Session: 10 min     On average, how many days per week do you engage in moderate to strenuous exercise (like a brisk walk)?: 1 day  Have you lost any weight without trying in the past 3 months?: No     Obesity Interventions: recommend walking more               Vision Screen:  Do you have difficulty driving, watching TV, or doing any of your daily activities because of your eyesight?: No  Have you had an eye exam within the past year?: (!) No  No results found. Interventions: Had lens placed years ago and vision has been good. Recommend follow up with eye exam                         Objective   Vitals:    12/12/22 0915   BP: 120/82   Pulse: 61   SpO2: 96%   Weight: 243 lb (110.2 kg)      Body mass index is 34.87 kg/m². Physical Exam  General Appearance: alert and oriented to person, place and time, well developed and well- nourished, in no acute distress  Skin: warm and dry, no rash or erythema  Head: normocephalic and atraumatic  Eyes: pupils equal, round, and reactive to light, extraocular eye movements intact, conjunctivae normal    Neck: supple   Pulmonary/Chest: clear to auscultation bilaterally- no wheezes, rales or rhonchi, normal air movement, no respiratory distress  Cardiovascular: normal rate, regular rhythm, normal S1 and S2,     Neurologic: speech normal       No Known Allergies  Prior to Visit Medications    Medication Sig Taking?  Authorizing Provider   potassium chloride (KLOR-CON M) 20 MEQ extended release tablet Take 2 tablets by mouth daily for 7 days  Alvaro Carlton DO   aspirin (ASPIRIN CHILDRENS) 81 MG chewable tablet Take 1 tablet by mouth daily  Tonia Santiago MD   furosemide (LASIX) 40 MG tablet Take 1 tablet by mouth daily  Tonia Santiago MD   clopidogrel (PLAVIX) 75 MG tablet Take 1 tablet by mouth daily  Kirstin Bolivar MD   nitroGLYCERIN (NITROSTAT) 0.4 MG SL tablet Place 1 tablet under the tongue every 5 minutes as needed for Chest pain  Kirstin Bolivar MD   metoprolol (TOPROL-XL) 25 MG XL tablet Take 25 mg by mouth. 1/2 tab daily  Historical Provider, MD   isosorbide mononitrate (IMDUR) 30 MG CR tablet Take 30 mg by mouth daily. Historical Provider, MD   lisinopril (PRINIVIL;ZESTRIL) 10 MG tablet Take 10 mg by mouth daily. Historical Provider, MD   amLODIPine (NORVASC) 10 MG tablet Take 10 mg by mouth daily. Historical Provider, MD   simvastatin (ZOCOR) 40 MG tablet Take 40 mg by mouth nightly. Historical Provider, MD   tamsulosin (FLOMAX) 0.4 MG capsule Take 0.4 mg by mouth daily.   Historical Provider, MD [place] (Including outside providers/suppliers regularly involved in providing care):   Patient Care Team:  Ta Reina DO as PCP - General (Family Medicine)  Ta Reina DO as PCP - [address] [place] [address] Dr Davila Provider     Reviewed and updated this visit:  Tobacco  Allergies  Meds  Med Hx  Surg Hx  Soc Hx  Fam Hx

## 2023-03-30 ENCOUNTER — HOSPITAL ENCOUNTER (OUTPATIENT)
Age: 73
Setting detail: SPECIMEN
Discharge: HOME | End: 2023-03-30

## 2023-03-30 LAB
ALT SERPL-CCNC: 27 U/L (ref 5–41)
AST SERPL-CCNC: 44 U/L
CHOLEST SERPL-MCNC: 164 MG/DL
CHOLESTEROL/HDL RATIO: 3.4
HDLC SERPL-MCNC: 48 MG/DL
LDLC SERPL CALC-MCNC: 80 MG/DL (ref 0–130)
TRIGL SERPL-MCNC: 179 MG/DL

## 2023-03-31 ENCOUNTER — HOSPITAL ENCOUNTER (OUTPATIENT)
Age: 73
Setting detail: SPECIMEN
Discharge: HOME | End: 2023-03-31

## 2023-03-31 LAB
ALT SERPL-CCNC: 25 U/L (ref 5–41)
AST SERPL-CCNC: 29 U/L

## 2023-07-21 ENCOUNTER — HOSPITAL ENCOUNTER (OUTPATIENT)
Age: 73
Setting detail: SPECIMEN
Discharge: HOME | End: 2023-07-21

## 2023-07-21 LAB — PSA SERPL-MCNC: 3.87 NG/ML

## 2023-08-10 ENCOUNTER — OFFICE VISIT (OUTPATIENT)
Dept: FAMILY MEDICINE CLINIC | Age: 73
End: 2023-08-10
Payer: MEDICARE

## 2023-08-10 VITALS
OXYGEN SATURATION: 94 % | DIASTOLIC BLOOD PRESSURE: 60 MMHG | TEMPERATURE: 97.9 F | WEIGHT: 249 LBS | HEIGHT: 70 IN | HEART RATE: 68 BPM | RESPIRATION RATE: 18 BRPM | SYSTOLIC BLOOD PRESSURE: 118 MMHG | BODY MASS INDEX: 35.65 KG/M2

## 2023-08-10 DIAGNOSIS — M54.2 NECK PAIN: Primary | ICD-10-CM

## 2023-08-10 PROBLEM — I25.119 ATHEROSCLEROTIC HEART DISEASE OF NATIVE CORONARY ARTERY WITH UNSPECIFIED ANGINA PECTORIS (HCC): Status: ACTIVE | Noted: 2023-08-10

## 2023-08-10 PROBLEM — D68.9 COAGULATION DEFECT (HCC): Status: ACTIVE | Noted: 2023-08-10

## 2023-08-10 PROBLEM — I20.9 ANGINA PECTORIS, UNSPECIFIED (HCC): Status: ACTIVE | Noted: 2023-08-10

## 2023-08-10 PROCEDURE — 1123F ACP DISCUSS/DSCN MKR DOCD: CPT | Performed by: NURSE PRACTITIONER

## 2023-08-10 PROCEDURE — 99213 OFFICE O/P EST LOW 20 MIN: CPT | Performed by: NURSE PRACTITIONER

## 2023-08-10 PROCEDURE — 3074F SYST BP LT 130 MM HG: CPT | Performed by: NURSE PRACTITIONER

## 2023-08-10 PROCEDURE — 3078F DIAST BP <80 MM HG: CPT | Performed by: NURSE PRACTITIONER

## 2023-08-10 RX ORDER — BUMETANIDE 2 MG/1
2 TABLET ORAL DAILY
COMMUNITY

## 2023-08-10 RX ORDER — METHYLPREDNISOLONE 4 MG/1
TABLET ORAL
Qty: 1 KIT | Refills: 0 | Status: SHIPPED | OUTPATIENT
Start: 2023-08-10 | End: 2023-08-16

## 2023-08-10 SDOH — ECONOMIC STABILITY: INCOME INSECURITY: HOW HARD IS IT FOR YOU TO PAY FOR THE VERY BASICS LIKE FOOD, HOUSING, MEDICAL CARE, AND HEATING?: NOT HARD AT ALL

## 2023-08-10 SDOH — ECONOMIC STABILITY: FOOD INSECURITY: WITHIN THE PAST 12 MONTHS, THE FOOD YOU BOUGHT JUST DIDN'T LAST AND YOU DIDN'T HAVE MONEY TO GET MORE.: NEVER TRUE

## 2023-08-10 SDOH — ECONOMIC STABILITY: FOOD INSECURITY: WITHIN THE PAST 12 MONTHS, YOU WORRIED THAT YOUR FOOD WOULD RUN OUT BEFORE YOU GOT MONEY TO BUY MORE.: NEVER TRUE

## 2023-08-10 SDOH — ECONOMIC STABILITY: HOUSING INSECURITY
IN THE LAST 12 MONTHS, WAS THERE A TIME WHEN YOU DID NOT HAVE A STEADY PLACE TO SLEEP OR SLEPT IN A SHELTER (INCLUDING NOW)?: NO

## 2023-08-10 ASSESSMENT — PATIENT HEALTH QUESTIONNAIRE - PHQ9
SUM OF ALL RESPONSES TO PHQ QUESTIONS 1-9: 0
SUM OF ALL RESPONSES TO PHQ9 QUESTIONS 1 & 2: 0
2. FEELING DOWN, DEPRESSED OR HOPELESS: 0
1. LITTLE INTEREST OR PLEASURE IN DOING THINGS: 0
SUM OF ALL RESPONSES TO PHQ QUESTIONS 1-9: 0

## 2023-08-10 ASSESSMENT — ENCOUNTER SYMPTOMS: BACK PAIN: 0

## 2023-08-10 NOTE — PROGRESS NOTES
[address] [facility] WALK-IN  [place] [address]  [place] [address]  Dept: [phone]    Krysta Suarez is a 72 y.o. male Established patient, who presents to the walk-in clinic today with conditions/complaints as noted below:    Chief Complaint   Patient presents with    Neck Pain     Onset 1 week ago; has a bad disc in his neck and it flares up every couple years         HPI:     Patient presents to walk in clinic with concerns about flare up of chronic neck pain. He has a disc problem and tells me every few years he has a flare up of pain. He painted inside of his house. Has been hurting for last week. Last flare up he got a steroid pack and it helped. Has been taking OTC ibuprofen 600 mg as needed which helps some. Also using heating pad as needed. Neck Pain   This is a new problem. The current episode started in the past 7 days. The problem occurs intermittently. Associated with: painting his house. The pain is present in the midline. Pertinent negatives include no chest pain, fever, headaches, leg pain, numbness, paresis, tingling or weakness. He has tried heat and NSAIDs for the symptoms. The treatment provided mild relief. Past Medical History:   Diagnosis Date    Arthritis     Coronary artery disease (CAD) excluded     CABG x 4 in 1993, 5 stents after between 2000 and 2005    Enlarged prostate     Hyperlipidemia     Hypertension     Obesity     Positive cardiac stress test 2021    SOB (shortness of breath)     Wears partial dentures     upper       Current Outpatient Medications   Medication Sig Dispense Refill    bumetanide (BUMEX) 2 MG tablet Take 1 tablet by mouth daily      methylPREDNISolone (MEDROL DOSEPACK) 4 MG tablet Take by mouth.  1 kit 0    metoprolol succinate (TOPROL XL) 25 MG extended release tablet TAKE ONE TABLET BY MOUTH TWICE A  tablet 1    simvastatin (ZOCOR) 80 MG tablet       lisinopril

## 2023-09-06 ENCOUNTER — HOSPITAL ENCOUNTER (OUTPATIENT)
Dept: GENERAL RADIOLOGY | Age: 73
Discharge: HOME | End: 2023-09-08
Payer: MEDICARE

## 2023-09-06 ENCOUNTER — HOSPITAL ENCOUNTER (OUTPATIENT)
Age: 73
Discharge: HOME | End: 2023-09-08
Payer: MEDICARE

## 2023-09-06 ENCOUNTER — OFFICE VISIT (OUTPATIENT)
Dept: FAMILY MEDICINE CLINIC | Age: 73
End: 2023-09-06
Payer: MEDICARE

## 2023-09-06 ENCOUNTER — HOSPITAL ENCOUNTER (OUTPATIENT)
Age: 73
Setting detail: SPECIMEN
Discharge: HOME | End: 2023-09-06

## 2023-09-06 VITALS
HEART RATE: 56 BPM | BODY MASS INDEX: 34.84 KG/M2 | DIASTOLIC BLOOD PRESSURE: 80 MMHG | WEIGHT: 242.8 LBS | TEMPERATURE: 97.9 F | OXYGEN SATURATION: 97 % | SYSTOLIC BLOOD PRESSURE: 118 MMHG

## 2023-09-06 DIAGNOSIS — M54.2 NECK PAIN: ICD-10-CM

## 2023-09-06 DIAGNOSIS — Z91.89 AT INCREASED RISK OF EXPOSURE TO COVID-19 VIRUS: ICD-10-CM

## 2023-09-06 DIAGNOSIS — J40 BRONCHITIS: Primary | ICD-10-CM

## 2023-09-06 DIAGNOSIS — R06.02 SOB (SHORTNESS OF BREATH) ON EXERTION: ICD-10-CM

## 2023-09-06 PROCEDURE — 99214 OFFICE O/P EST MOD 30 MIN: CPT | Performed by: NURSE PRACTITIONER

## 2023-09-06 PROCEDURE — 72040 X-RAY EXAM NECK SPINE 2-3 VW: CPT

## 2023-09-06 PROCEDURE — 3079F DIAST BP 80-89 MM HG: CPT | Performed by: NURSE PRACTITIONER

## 2023-09-06 PROCEDURE — 1123F ACP DISCUSS/DSCN MKR DOCD: CPT | Performed by: NURSE PRACTITIONER

## 2023-09-06 PROCEDURE — 3074F SYST BP LT 130 MM HG: CPT | Performed by: NURSE PRACTITIONER

## 2023-09-06 RX ORDER — PREDNISONE 20 MG/1
20 TABLET ORAL 2 TIMES DAILY
Qty: 10 TABLET | Refills: 0 | Status: SHIPPED | OUTPATIENT
Start: 2023-09-06 | End: 2023-09-11

## 2023-09-06 RX ORDER — DOXYCYCLINE HYCLATE 100 MG
100 TABLET ORAL 2 TIMES DAILY
Qty: 20 TABLET | Refills: 0 | Status: SHIPPED | OUTPATIENT
Start: 2023-09-06

## 2023-09-06 ASSESSMENT — ENCOUNTER SYMPTOMS
NAUSEA: 0
SORE THROAT: 0
COUGH: 1
VOMITING: 0
SHORTNESS OF BREATH: 1
WHEEZING: 1

## 2023-09-06 NOTE — PROGRESS NOTES
Extraocular Movements: Extraocular movements intact. Conjunctiva/sclera: Conjunctivae normal.      Pupils: Pupils are equal, round, and reactive to light. Cardiovascular:      Rate and Rhythm: Normal rate and regular rhythm. Pulses: Normal pulses. Pulmonary:      Effort: Pulmonary effort is normal. No tachypnea. Breath sounds: Wheezing present. Abdominal:      General: Bowel sounds are normal.      Palpations: Abdomen is soft. Musculoskeletal:      Cervical back: Neck supple. Tenderness present. No rigidity. Pain with movement and spinous process tenderness present. No muscular tenderness. Skin:     General: Skin is warm and dry. Capillary Refill: Capillary refill takes less than 2 seconds. Neurological:      Mental Status: He is alert and oriented to person, place, and time. Psychiatric:         Mood and Affect: Mood normal.         Thought Content: Thought content normal.         MEDICAL DECISION MAKING Assessment/Plan:     Fariha Vinson was seen today for congestion and cough. Diagnoses and all orders for this visit:    Bronchitis  -     predniSONE (DELTASONE) 20 MG tablet; Take 1 tablet by mouth 2 times daily for 5 days  -     doxycycline hyclate (VIBRA-TABS) 100 MG tablet; Take 1 tablet by mouth 2 times daily    Neck pain  -     predniSONE (DELTASONE) 20 MG tablet; Take 1 tablet by mouth 2 times daily for 5 days  -     XR CERVICAL SPINE (2-3 VIEWS); Future    SOB (shortness of breath) on exertion  -     predniSONE (DELTASONE) 20 MG tablet; Take 1 tablet by mouth 2 times daily for 5 days    At increased risk of exposure to COVID-19 virus  -     COVID-19; Future        Results for orders placed or performed during the hospital encounter of 07/21/23   PSA, Diagnostic   Result Value Ref Range    PSA 3.87 <4.1 ng/mL     Based on the patient's history and exam will treat as bronchitis.    COVID-19 testing sent to lab per pt request.   The patient does not have clinical findings suggestive

## 2023-09-07 LAB
SARS-COV-2 RNA RESP QL NAA+PROBE: NORMAL
SARS-COV-2 RNA RESP QL NAA+PROBE: NOT DETECTED
SOURCE: NORMAL

## 2023-09-14 ENCOUNTER — OFFICE VISIT (OUTPATIENT)
Dept: PRIMARY CARE CLINIC | Age: 73
End: 2023-09-14
Payer: MEDICARE

## 2023-09-14 VITALS
DIASTOLIC BLOOD PRESSURE: 55 MMHG | OXYGEN SATURATION: 96 % | HEART RATE: 66 BPM | BODY MASS INDEX: 34.29 KG/M2 | WEIGHT: 239 LBS | SYSTOLIC BLOOD PRESSURE: 98 MMHG

## 2023-09-14 DIAGNOSIS — J40 BRONCHITIS: ICD-10-CM

## 2023-09-14 DIAGNOSIS — M54.2 NECK PAIN: Primary | ICD-10-CM

## 2023-09-14 DIAGNOSIS — G44.52 NEW PERSISTENT DAILY HEADACHE: ICD-10-CM

## 2023-09-14 DIAGNOSIS — H53.149 PHOTOPHOBIA: ICD-10-CM

## 2023-09-14 DIAGNOSIS — I25.119 ATHEROSCLEROSIS OF NATIVE CORONARY ARTERY OF NATIVE HEART WITH ANGINA PECTORIS (HCC): ICD-10-CM

## 2023-09-14 PROBLEM — D68.9 COAGULATION DEFECT (HCC): Status: RESOLVED | Noted: 2023-08-10 | Resolved: 2023-09-14

## 2023-09-14 PROCEDURE — 1123F ACP DISCUSS/DSCN MKR DOCD: CPT | Performed by: FAMILY MEDICINE

## 2023-09-14 PROCEDURE — 99214 OFFICE O/P EST MOD 30 MIN: CPT | Performed by: FAMILY MEDICINE

## 2023-09-14 PROCEDURE — 3078F DIAST BP <80 MM HG: CPT | Performed by: FAMILY MEDICINE

## 2023-09-14 PROCEDURE — 3074F SYST BP LT 130 MM HG: CPT | Performed by: FAMILY MEDICINE

## 2023-09-14 RX ORDER — BENZONATATE 100 MG/1
100 CAPSULE ORAL 3 TIMES DAILY PRN
Qty: 30 CAPSULE | Refills: 1 | Status: SHIPPED | OUTPATIENT
Start: 2023-09-14

## 2023-09-14 ASSESSMENT — ENCOUNTER SYMPTOMS
WHEEZING: 0
VOMITING: 0
COUGH: 1

## 2023-09-14 NOTE — PROGRESS NOTES
[address]  PRIMARY CARE  [address]  [address] [facility] [address]  [facility] [place] [address]  Dept: [phone]  Dept Fax: [phone]    Sabina Cunha is a 72 y.o. male who presents today for his medical conditions/complaints as noted below. Sabina Cunha is c/o of  Chief Complaint   Patient presents with    Neck Pain     Ongoing this time for 1mo, was given a steroid and has had terrible migraines since, when neck pain flares up pts vision becomes very bright and white    Cough     Pt coughs and has a hard time catching his breath       HPI:     HPI      Pt here for follow up on neck pain and cough    Pt notes ongoing intermittent neck pain for some time but had flare up last month for which he got steroids which did help the neck pain. However he notes for the past month he has been having headaches on a daily basis. States he gets photophobia as well with his headaches. Pt notes he does not usually have headaches and this is new for him. Takes advil at times for the headaches. BP slightly low today, denies feeling any dizziness. Went to walk in on 9/6 for cough- was given doxycline and steroids.   States his cough is somewhat improving but when he does cough a lot has hard time catching his breath        Hemoglobin A1C (%)   Date Value   11/16/2022 5.2   02/11/2019 5.4             ( goal A1C is < 7)   No components found for: \"LABMICR\"  LDL Cholesterol (mg/dL)   Date Value   03/30/2023 80   03/07/2022 46   02/05/2019 65       (goal LDL is <100)   AST (U/L)   Date Value   03/31/2023 29     ALT (U/L)   Date Value   03/31/2023 25     BUN (mg/dL)   Date Value   12/12/2022 15     BP Readings from Last 3 Encounters:   09/14/23 (!) 98/55   09/06/23 118/80   08/10/23 118/60          (goal 120/80)    Past Medical History:   Diagnosis Date    Arthritis     Coronary artery disease (CAD) excluded     CABG x 4 in 1993, 5 stents after between 2000 and 2005    Enlarged prostate

## 2023-09-20 ENCOUNTER — HOSPITAL ENCOUNTER (OUTPATIENT)
Dept: PHYSICAL THERAPY | Facility: CLINIC | Age: 73
Setting detail: THERAPIES SERIES
Discharge: HOME | End: 2023-09-20
Payer: MEDICARE

## 2023-09-20 PROCEDURE — 97110 THERAPEUTIC EXERCISES: CPT

## 2023-09-20 PROCEDURE — 97161 PT EVAL LOW COMPLEX 20 MIN: CPT

## 2023-09-20 NOTE — CONSULTS
[] [address]  [address] [facility].  [phone]  F: [phone] [] 204 [facility]  [address]   [address]  P: [phone]  F: [phone] [] [address]  [address] [facility]  P: [phone]  F: [phone] [x] [place]: [phone]  F: [phone] [] [address]  One [facility]   [address]   P: [phone]  F: [phone]  [] [address] [facility].   P: [phone]  F: [phone] [] 205 [facility]  [address]  P: [phone]  F: [phone] [] 224 [facility]  [address] [facility]  [place]: [phone]  F: [phone] [] [address] [place]  [place]: [phone]  F: [phone]      Physical Therapy Spine Evaluation    Date:  2023  Patient: Dawson Harness  : 1950  MRN: [id number]  Physician: Caitlin Sanchez DO   Insurance: [employer] Medicare   Medical Diagnosis:   M54.2 (ICD-10-CM) - Neck pain   H53.149 (ICD-10-CM) - Photophobia   Rehab Codes:   M54.2 (ICD-10-CM) - Neck pain   H53.149 (ICD-10-CM) - Photophobia   M62.81  Onset Date: 23; see below  Next 's appt.: 23; 23- MRI of brain      Subjective:   CC:    \"A few years ago- course of steroids- and then it's gone- but didn't happen like this this time. \"  L hand- first two digits- \"been asleep- since 4 or 5 years ago. \"   \"Just sitting here- it's fine. \"   \"But today I cut the grass- assisted through that could barely hold my neck up- painful across my

## 2023-09-21 NOTE — FLOWSHEET NOTE
Juan Fall Risk Assessment    Patient Name:  Wade Dumont  : 1950    Risk Factor Scale  Score   History of Falls [] Yes  [x] No 25  0 0   Secondary Diagnosis [] Yes  [x] No 15  0 0   Ambulatory Aid [] Furniture  [] Crutches/cane/walker  [x] None/bedrest/wheelchair/nurse 30  15  0 0   IV/Heparin Lock [] Yes  [x] No 20  0 0   Gait/Transferring [] Impaired  [] Weak  [x] Normal/bedrest/immobile 20  10  0 0   Mental Status [] Forgets limitations  [x] Oriented to own ability 15  0 0      Total: 0     Based on the Assessment score: check the appropriate box.     [x]  No intervention needed   Low =   Score of 0-24    []  Use standard prevention interventions Moderate =  Score of 24-44   [] Give patient handout and discuss fall prevention strategies   [] Establish goal of education for patient/family RE: fall prevention strategies    []  Use high risk prevention interventions High = Score of 45 and higher   [] Give patient handout and discuss fall prevention strategies   [] Establish goal of education for patient/family Re: fall prevention strategies   [] Discuss lifeline / other resources    Electronically signed by:   Astrid Doe PT  Date: 2023

## 2023-09-25 ENCOUNTER — HOSPITAL ENCOUNTER (OUTPATIENT)
Dept: MRI IMAGING | Age: 73
Discharge: HOME | End: 2023-09-27
Payer: MEDICARE

## 2023-09-25 ENCOUNTER — TELEPHONE (OUTPATIENT)
Dept: PRIMARY CARE CLINIC | Age: 73
End: 2023-09-25

## 2023-09-25 DIAGNOSIS — G44.52 NEW PERSISTENT DAILY HEADACHE: ICD-10-CM

## 2023-09-25 DIAGNOSIS — H53.149 PHOTOPHOBIA: ICD-10-CM

## 2023-09-25 DIAGNOSIS — N17.9 AKI (ACUTE KIDNEY INJURY) (HCC): Primary | ICD-10-CM

## 2023-09-25 LAB — CREAT BLD-MCNC: 2.2 MG/DL (ref 0.6–1.4)

## 2023-09-25 PROCEDURE — 2580000003 HC RX 258: Performed by: FAMILY MEDICINE

## 2023-09-25 PROCEDURE — 82565 ASSAY OF CREATININE: CPT

## 2023-09-25 PROCEDURE — A9579 GAD-BASE MR CONTRAST NOS,1ML: HCPCS | Performed by: FAMILY MEDICINE

## 2023-09-25 PROCEDURE — 6360000004 HC RX CONTRAST MEDICATION: Performed by: FAMILY MEDICINE

## 2023-09-25 PROCEDURE — 70551 MRI BRAIN STEM W/O DYE: CPT

## 2023-09-25 RX ORDER — SODIUM CHLORIDE 0.9 % (FLUSH) 0.9 %
10 SYRINGE (ML) INJECTION PRN
Status: DISCONTINUED | OUTPATIENT
Start: 2023-09-25 | End: 2023-09-25

## 2023-09-25 NOTE — TELEPHONE ENCOUNTER
Please let pt know to avoid any ibuprofen/aleve/motrin and we can recheck his labs in 1 week . I have placed the order for metabolic panel. Recommend he touch base with his cardiologist to see if they want to adjust his bumex due to his creatinine of 2.2.

## 2023-09-25 NOTE — TELEPHONE ENCOUNTER
Call from [facility] with [address] to notify us that the patient's MRI 10 [facility] was just done wo contrast due to lab findings. Due to patient history of hypertension a creatinine level was drawn, level was 2.2 and GFR was 32. She states their GFR cutoff level is 30. At the discretion of their radiologist Dr. Ruperto Banda they did the test without contrast.     Patient was concerned about his test levels and was informed that we would contact him to give more information regarding this.

## 2023-09-26 ENCOUNTER — HOSPITAL ENCOUNTER (OUTPATIENT)
Dept: PHYSICAL THERAPY | Facility: CLINIC | Age: 73
Setting detail: THERAPIES SERIES
Discharge: HOME | End: 2023-09-26
Payer: MEDICARE

## 2023-09-26 PROCEDURE — 97110 THERAPEUTIC EXERCISES: CPT

## 2023-09-26 PROCEDURE — 97140 MANUAL THERAPY 1/> REGIONS: CPT

## 2023-09-26 NOTE — FLOWSHEET NOTE
[] [address]  [address] [facility].  [phone]  F: [phone] [] 204 [employer]  [address]   [address]  P: [phone]  F: [phone] [] [address]  [address] [facility]  P: [phone]  F: [phone] [x] [place]: [phone]  F: [phone] [] 224 [facility]  One [facility]   [address]   P: [phone]  F: [phone]  [] 48[address] [facility].   P: [phone]  F: [phone] [] 205 [facility]  [address] [facility] [address]   [address]  P: [phone]  F: [phone] [] 224 [facility]  7[address] [facility]  [place]: [phone]  F: [phone] [] 1 [address] [facility] [address]  [place]: [phone]  F: [phone]      Physical Therapy Daily Treatment Note    Date:  2023  Patient Name:  Crow Ulloa    :  1950  MRN: [id number]  Physician: Kiara Hinds DO                             Insurance: [facility] Medicare   Medical Diagnosis:   M54.2 (ICD-10-CM) - Neck pain   H53.149 (ICD-10-CM) - Photophobia   Rehab Codes:   M54.2 (ICD-10-CM) - Neck pain   H53.149 (ICD-10-CM) - Photophobia   M62.81  Onset Date: 23; see below                   Next 's appt.: 23; 23- MRI of brain      Visit# / total visits: 2     Cancels/No Shows: 0/0    Subjective:    Pain:  [x] Yes  [] No  Location: Neck   Pain Rating: (0-10 scale) 0/10 at rest, 7/10 at worst with movement   Pain altered Tx:  [x] No  [] Yes  Action:  Comments: Pt reports no sig changes

## 2023-09-28 ENCOUNTER — HOSPITAL ENCOUNTER (OUTPATIENT)
Dept: PHYSICAL THERAPY | Facility: CLINIC | Age: 73
Setting detail: THERAPIES SERIES
Discharge: HOME | End: 2023-09-28
Payer: MEDICARE

## 2023-09-28 PROCEDURE — 97140 MANUAL THERAPY 1/> REGIONS: CPT

## 2023-09-28 PROCEDURE — 97110 THERAPEUTIC EXERCISES: CPT

## 2023-09-28 NOTE — FLOWSHEET NOTE
[] [address]  4600 [facility].  [phone]  F: [phone] [] 204 [facility]  [address]   [address]  P: [phone]  F: [phone] [] [address]  151 [facility]  P: [phone]  F: [phone] [x] [place]: [phone]  F: [phone] [] 224 [facility]  One [facility]   [address]   P: [phone]  F: [phone]  [] 26[address] [facility].   P: [phone]  F: [phone] [] 205 [facility]  [address] [facility]   [address]  P: [phone]  F: [phone] [] 224 [facility]  7[address] [facility]  [place]: [phone]  F: [phone] [] 1 [address] [place] [facility] [address]  [place]: [phone]  F: [phone]      Physical Therapy Daily Treatment Note    Date:  2023  Patient Name:  Lizz Ochoa    :  1950  MRN: [id number]  Physician: Gia Arevalo DO                             Insurance: [employer] Mast Medicare   Medical Diagnosis:   M54.2 (ICD-10-CM) - Neck pain   H53.149 (ICD-10-CM) - Photophobia   Rehab Codes:   M54.2 (ICD-10-CM) - Neck pain   H53.149 (ICD-10-CM) - Photophobia   M62.81  Onset Date: 23; see below                   Next 's appt.: 23; 23- MRI of brain      Visit# / total visits: 3/12     Cancels/No Shows: 0/0    Subjective:    Pain:  [x] Yes  [] No  Location: Neck   Pain Rating: (0-10 scale) 0/10 at rest  Pain altered Tx:  [x] No  [] Yes  Action:  Comments: Pt reports his neck was feeling great when he left last

## 2023-09-28 NOTE — RESULT ENCOUNTER NOTE
Please let pt know MRI did not show any acute findings just some mild chronic changes that can occur with time.

## 2023-10-03 ENCOUNTER — HOSPITAL ENCOUNTER (OUTPATIENT)
Dept: PHYSICAL THERAPY | Facility: CLINIC | Age: 73
Setting detail: THERAPIES SERIES
Discharge: HOME | End: 2023-10-03
Payer: MEDICARE

## 2023-10-03 ENCOUNTER — HOSPITAL ENCOUNTER (OUTPATIENT)
Age: 73
Setting detail: SPECIMEN
Discharge: HOME | End: 2023-10-03

## 2023-10-03 DIAGNOSIS — N17.9 AKI (ACUTE KIDNEY INJURY) (HCC): ICD-10-CM

## 2023-10-03 LAB
ALBUMIN SERPL-MCNC: 4.1 G/DL (ref 3.5–5.2)
ALBUMIN/GLOB SERPL: 1.5 {RATIO} (ref 1–2.5)
ALP SERPL-CCNC: 72 U/L (ref 40–129)
ALT SERPL-CCNC: 18 U/L (ref 5–41)
ANION GAP SERPL CALCULATED.3IONS-SCNC: 14 MMOL/L (ref 9–17)
AST SERPL-CCNC: 19 U/L
BILIRUB SERPL-MCNC: 0.4 MG/DL (ref 0.3–1.2)
BUN SERPL-MCNC: 53 MG/DL (ref 8–23)
CALCIUM SERPL-MCNC: 8.9 MG/DL (ref 8.6–10.4)
CHLORIDE SERPL-SCNC: 103 MMOL/L (ref 98–107)
CO2 SERPL-SCNC: 20 MMOL/L (ref 20–31)
CREAT SERPL-MCNC: 2 MG/DL (ref 0.7–1.2)
GFR SERPL CREATININE-BSD FRML MDRD: 35 ML/MIN/1.73M2
GLUCOSE SERPL-MCNC: 113 MG/DL (ref 70–99)
POTASSIUM SERPL-SCNC: 5.9 MMOL/L (ref 3.7–5.3)
PROT SERPL-MCNC: 6.8 G/DL (ref 6.4–8.3)
SODIUM SERPL-SCNC: 137 MMOL/L (ref 135–144)

## 2023-10-03 PROCEDURE — 97110 THERAPEUTIC EXERCISES: CPT

## 2023-10-03 PROCEDURE — 97140 MANUAL THERAPY 1/> REGIONS: CPT

## 2023-10-03 NOTE — FLOWSHEET NOTE
services in order to: inc cervical spine AROM- all motions; inc gross shoulder/scapular strength; and overall improve tolerance for riding his , leaning over his kitchen counters, golf. STG/LTG:  STG: (to be met in 6 treatments)  ? Pain: Patient will report <7/10 pain with active movement in order to improve QOL. ? ROM: Will demo 5-10 deg inc in all cervical spine AROM- especially R side motions- in order to improve functional mobility. ? Strength: Will demo 4+/5 B gross shoulder strength, 4/5 B scapular strength, in order to better support cervical spine and prevent future injuries. Deep cervical flexor test without complaints at the finish. ? Function: Patient will report decreased TTP to B ELIEL in order to indicate decreased inflammation and improved healing of injured site. Patient to be independent with home exercise program as demonstrated by performance with correct form without cues. LTG: (to be met in 12 treatments)  Will ride , stand over kitchen counters to perform tasks for x20 minutes with <51/0 P and symptoms for improved QOL and IADLS. Improve NDI to <18% impaired or limited      Patient goals: \"relief from pain. \"     Pt. Education:  [x] Yes  [] No  [x] Reviewed Prior HEP/Ed  Method of Education: [] Verbal  [] Demo  [] Written  Comprehension of Education:  [] Verbalizes understanding. [] Demonstrates understanding. [x] Needs review. [] Demonstrates/verbalizes HEP/Ed previously given. Access Code: [email or web address]  URL: [email or web address]  Date: 10/03/2023  Prepared by: Jami Dancer    Exercises  - Seated Correct Posture  - 1 x daily - 7 x weekly - 3 sets - 10 reps  - Seated Scapular Retraction  - 1 x daily - 7 x weekly - 2 sets - 10 reps - 5 sec hold  - Supine Cervical Retraction with Towel  - 1 x daily - 7 x weekly - 1 sets - 10 reps - 5 sec hold  - Supine Suboccipital Release with Tennis Balls  - 1 x daily - 7 x weekly - 1 sets - 1 reps - 1 min

## 2023-10-04 DIAGNOSIS — N17.9 AKI (ACUTE KIDNEY INJURY) (HCC): Primary | ICD-10-CM

## 2023-10-04 DIAGNOSIS — E87.5 HYPERKALEMIA: ICD-10-CM

## 2023-10-04 NOTE — RESULT ENCOUNTER NOTE
Spoke with pt recommend he discontinue his bumex for now given his low kidney function and also not take his potassium supplement. Recommend also discontinue lisinopril as well ( he was on 10 mg bid) but his BP was low in office 98/55.  We will recheck his labs on Friday and then will likely refer to nephrology but will check on labs Friday first

## 2023-10-05 ENCOUNTER — HOSPITAL ENCOUNTER (OUTPATIENT)
Dept: PHYSICAL THERAPY | Facility: CLINIC | Age: 73
Setting detail: THERAPIES SERIES
Discharge: HOME | End: 2023-10-05
Payer: MEDICARE

## 2023-10-05 PROCEDURE — 97140 MANUAL THERAPY 1/> REGIONS: CPT

## 2023-10-05 PROCEDURE — 97110 THERAPEUTIC EXERCISES: CPT

## 2023-10-05 NOTE — FLOWSHEET NOTE
[] [address]  46[address] [facility].  [phone]  F: [phone] [] 204 [facility]  [address]   [address]  P: [phone]  F: [phone] [] [address]  151 [facility]  P: [phone]  F: [phone] [x] [place]: [phone]  F: [phone] [] [address]  One [facility]   [address]   P: [phone]  F: [phone]  [] [address] [facility].   P: [phone]  F: [phone] [] 205 [facility]  [address] [place]    [address]  P: [phone]  F: [phone] [] [address]  [address] [facility]  [place]: [phone]  F: [phone] [] 1 Medical [facility] [address]  [place]: [phone]  F: [phone]      Physical Therapy Daily Treatment Note    Date:  10/5/2023  Patient Name:  Schuyler May    :  1950  MRN: [id number]  Physician: Mikie Bhatia DO                             Insurance: [employer] Medicare   Medical Diagnosis:   M54.2 (ICD-10-CM) - Neck pain   H53.149 (ICD-10-CM) - Photophobia   Rehab Codes:   M54.2 (ICD-10-CM) - Neck pain   H53.149 (ICD-10-CM) - Photophobia   M62.81  Onset Date: 23; see below                   Next 's appt.: 23; 23- MRI of brain    Visit# / total visits:      Cancels/No Shows: 0/0    Subjective:    Pain:  [x] Yes  [] No  Location: Neck   Pain Rating: (0-10 scale) 0/10 at rest  Pain altered Tx:  [x] No  [] Yes  Action:  Comments: Pt reported no pain currently, stated he had a rough day

## 2023-10-06 ENCOUNTER — HOSPITAL ENCOUNTER (OUTPATIENT)
Age: 73
Setting detail: SPECIMEN
Discharge: HOME | End: 2023-10-06

## 2023-10-06 DIAGNOSIS — E87.5 HYPERKALEMIA: ICD-10-CM

## 2023-10-06 DIAGNOSIS — N17.9 AKI (ACUTE KIDNEY INJURY) (HCC): ICD-10-CM

## 2023-10-06 LAB
ANION GAP SERPL CALCULATED.3IONS-SCNC: 11 MMOL/L (ref 9–16)
BUN SERPL-MCNC: 36 MG/DL (ref 8–23)
CALCIUM SERPL-MCNC: 9.1 MG/DL (ref 8.6–10.4)
CHLORIDE SERPL-SCNC: 108 MMOL/L (ref 98–107)
CO2 SERPL-SCNC: 20 MMOL/L (ref 20–31)
CREAT SERPL-MCNC: 1.4 MG/DL (ref 0.7–1.2)
GFR SERPL CREATININE-BSD FRML MDRD: 55 ML/MIN/1.73M2
GLUCOSE SERPL-MCNC: 104 MG/DL (ref 74–99)
POTASSIUM SERPL-SCNC: 5.9 MMOL/L (ref 3.7–5.3)
SODIUM SERPL-SCNC: 139 MMOL/L (ref 136–145)

## 2023-10-07 ENCOUNTER — TELEPHONE (OUTPATIENT)
Dept: PRIMARY CARE CLINIC | Age: 73
End: 2023-10-07

## 2023-10-07 DIAGNOSIS — E87.5 HYPERKALEMIA: Primary | ICD-10-CM

## 2023-10-07 NOTE — TELEPHONE ENCOUNTER
I called and left a voicemail for the patient regarding his potassium it is still the same at 5.9 however his kidney function does look like it starting to get better since holding the Bumex and the lisinopril and potassium. I recommend he keeps holding off of these and we will recheck on Monday his blood work to see if the potassium is getting better because it does take time for it to go down when stopping the medication. recommend monitoring BP.

## 2023-10-07 NOTE — PROGRESS NOTES
I called and left a voicemail for the patient regarding his potassium it is still the same at 5.9 however his kidney function does look like it starting to get better since holding the Bumex and the lisinopril and potassium. I recommend he keeps holding off of these and we will recheck on Monday his blood work to see if the potassium is getting better because it does take time for it to go down when stopping the medication. If he does  have any questions he can try calling back. I will place order for him.

## 2023-10-09 ENCOUNTER — HOSPITAL ENCOUNTER (OUTPATIENT)
Dept: PHYSICAL THERAPY | Facility: CLINIC | Age: 73
Setting detail: THERAPIES SERIES
Discharge: HOME | End: 2023-10-09
Payer: MEDICARE

## 2023-10-09 ENCOUNTER — HOSPITAL ENCOUNTER (OUTPATIENT)
Age: 73
Setting detail: SPECIMEN
Discharge: HOME | End: 2023-10-09

## 2023-10-09 DIAGNOSIS — E87.5 HYPERKALEMIA: ICD-10-CM

## 2023-10-09 LAB
ANION GAP SERPL CALCULATED.3IONS-SCNC: 12 MMOL/L (ref 9–17)
BUN SERPL-MCNC: 19 MG/DL (ref 8–23)
CALCIUM SERPL-MCNC: 8.9 MG/DL (ref 8.6–10.4)
CHLORIDE SERPL-SCNC: 106 MMOL/L (ref 98–107)
CO2 SERPL-SCNC: 19 MMOL/L (ref 20–31)
CREAT SERPL-MCNC: 1.2 MG/DL (ref 0.7–1.2)
GFR SERPL CREATININE-BSD FRML MDRD: >60 ML/MIN/1.73M2
GLUCOSE SERPL-MCNC: 110 MG/DL (ref 70–99)
POTASSIUM SERPL-SCNC: 5.4 MMOL/L (ref 3.7–5.3)
SODIUM SERPL-SCNC: 137 MMOL/L (ref 135–144)

## 2023-10-09 PROCEDURE — 97110 THERAPEUTIC EXERCISES: CPT

## 2023-10-09 PROCEDURE — 97140 MANUAL THERAPY 1/> REGIONS: CPT

## 2023-10-09 NOTE — FLOWSHEET NOTE
rest  Pain altered Tx:  [x] No  [] Yes  Action:  Comments:     Objective:  Modalities: MHP pre; CP post-held  Precautions: potential cervical spondylosis + postural syndrome- worst with riding , leaning over counters in kitchen- but intermittent   Exercises: concurrent migraines secondary to photophobia- upcoming MRI of brain   Exercise Reps/ Time Weight/ Level Comments COMPLETED   Posture education      Utilize towel roll for lumbar support, switch up types of siting surfaces while at home- verbalized understanding to all  -   UBE  2' ea  L0  x          Seated cervical retraction  2x10     x               Seated cervical flexion str. 2x5\"     -   Andre Emanuel. Ext  2x10 Lime Tband  Max cueing required  x   B UT stretch  2x20\" ea    x    Doorway Pec str 2x30\" ea   \"W\", \"Y\" x   Other: Forehead headaches- brought on by visual stimulation. Manual:  SOR- held   MFR to cervical musculature throughout- held   Gentle cervical distraction in approx 30 deg flex- held   IASTM via Hyperice- Flat head attachment to B UT and levators      Specific Instructions for next treatment:     Treatment Charges: Mins Units   [x]  Modalities:MHP      [x]  Ther Exercise 25 1   [x]  Manual Therapy 10 1   []  Ther Activities     []  Neuro Re-ed     []  Vasocompression     [] Gait     []  Other     Total Treatment time 35 2     Assessment: [x] Progressing toward goals. Pt began with UBE and then performed activities as listed above with good tolerance. Added Tband extension requiring max verbal cues for proper tech with tband exercises. Issues lime band for HEP. Pt concluded session with hypervolt to decrease muscular tension with pt noting relief. [] No change.      [] Other:  [x] Patient would continue to benefit from skilled physical therapy services in order to: inc cervical spine AROM- all motions; inc gross shoulder/scapular strength; and overall improve tolerance for riding his , leaning over his kitchen

## 2023-10-10 NOTE — RESULT ENCOUNTER NOTE
Please let pt know his labs improved. Recommend he take half of his bumex only on days he needs it for any increased swelling ( not daily basis). To not take potassium or lisinopril. And recommend letting me know if his bp is elevated.

## 2023-10-13 ENCOUNTER — HOSPITAL ENCOUNTER (OUTPATIENT)
Dept: PHYSICAL THERAPY | Facility: CLINIC | Age: 73
Setting detail: THERAPIES SERIES
Discharge: HOME | End: 2023-10-13
Payer: MEDICARE

## 2023-10-13 PROCEDURE — 97110 THERAPEUTIC EXERCISES: CPT

## 2023-10-13 PROCEDURE — 97140 MANUAL THERAPY 1/> REGIONS: CPT

## 2023-10-13 NOTE — FLOWSHEET NOTE
setting to L UT and levator, medial scapular border in sitting for x10'. See below for goal update. NDI is 2% impaired or limited     L > R stiffness- intermittent as of recent      Specific Instructions for next treatment: Follow-up with pt regarding tolerance to last, progressed treatment session- continue if proven beneficial or modify PRN next visit. Issue new, updated HEP handout of bolded interventions within grid above as well. Continue at x1 week, for remainder of PT POC, in order to focus upon WB cervical, scapular strengthening interventions. Treatment Charges: Mins Units   []  Modalities:      [x]  Ther Exercise 35 2   [x]  Manual Therapy 10 1   []  Ther Activities     []  Neuro Re-ed     []  Vasocompression     [] Gait     []  Other     Total Treatment time 45 3     [x6' on UBE]    Assessment: [x] Progressing toward goals. Pt presents without symptoms, and reports improvements overall with PT services thus far. Therefore, performed goal update and re-assessed NDI for updated PT POC to referring MD- results as detailed per above and below- demos progress or meets majority of therapy goals. However, continues to complain of intermittent L>R side stiffness, may be secondary to lack of deep cervical and scapular strength. Therefore, rec continued PT services at x1 week, for remainder of PT POC, in order to focus upon WB cervical, scapular strengthening interventions- verbalized understanding to all. Consider ideas listed above and below next visit. [] No change. [] Other:  [x] Patient would continue to benefit from skilled physical therapy services in order to: inc cervical spine AROM- all motions; inc gross shoulder/scapular strength; and overall improve tolerance for riding his , leaning over his kitchen counters, golf. STG: (to be met in 6 treatments)  ?  Pain: Patient will report <7/10 pain with active movement in order to improve QOL.- MET  ? ROM: Will demo 5-10 deg inc in

## 2023-10-13 NOTE — PLAN OF CARE
[] [facility] - [facility] &  Therapy  [address] [facility].  [phone]  F: [phone] [] 204 [facility]  [address] [facility] [address]   [address]  P: [phone]  F: [phone] [] [address] &  Therapy  [address]  P: [phone]  F: [phone] [x] [facility]  P: [phone]  F: [phone] [] 224 [facility]  [address] [facility]   [address]   [place]: [phone]  F: [phone]      Physical Therapy Plan of Care    Date:  10/13/2023  Patient: Manjit Hardy  : 1950  MRN: [id number]  Physician: Anoop Car DO                             Insurance: [employer] Scrivener Medicare   Medical Diagnosis:   M54.2 (ICD-10-CM) - Neck pain   H53.149 (ICD-10-CM) - Photophobia   Rehab Codes:   M54.2 (ICD-10-CM) - Neck pain   H53.149 (ICD-10-CM) - Photophobia   M62.81  Onset Date: 23; see below                   Next 's appt.: 23; Visit# / total visits:                                   Cancels/No Shows: 0/0    Subjective:    Pain:  [x] Yes  [] No               Location: Neck                        Pain Rating: (0-10 scale) 0/10 at rest  Pain altered Tx:  [x] No  [] Yes  Action: N/A  Comments: Denies P and symptoms at present- also over this past week. Underwent MRI of brain- nothing significant. No plans to consult with neurology as of yet. Plans to follow-up with PCP in December. Cataract correction- several years ago- has not followed up further with optometry. Good compliance, tolerance to current HEP interventions. Improvements overall with PT services thus far. MRI of brain  IMPRESSION:  1. No acute intracranial abnormality. 2. Mild chronic white matter microvascular ischemic changes.     Assessment:  Pt presents without symptoms,

## 2023-10-19 ENCOUNTER — HOSPITAL ENCOUNTER (OUTPATIENT)
Dept: PHYSICAL THERAPY | Facility: CLINIC | Age: 73
Setting detail: THERAPIES SERIES
Discharge: HOME | End: 2023-10-19
Payer: MEDICARE

## 2023-10-19 PROCEDURE — 97140 MANUAL THERAPY 1/> REGIONS: CPT

## 2023-10-19 PROCEDURE — 97110 THERAPEUTIC EXERCISES: CPT

## 2023-10-19 NOTE — FLOWSHEET NOTE
[] [address]  46[address] [facility].  [phone]  F: [phone] [] 204 [facility]  [address]   [address]  P: [phone]  F: [phone] [] [address]  [address] [facility]  P: [phone]  F: [phone] [x] [facility]: [phone]  F: [phone] [] 224 [facility]  One [facility]   [address]   P: [phone]  F: [phone]  [] [address] [facility].   P: [phone]  F: [phone] [] 205 [facility]  [address] [place]  [address]  P: [phone]  F: [phone] [] 224 [facility]  [address] [facility]  [place]: [phone]  F: [phone] [] 1 [address] [place] [facility] [address]  [place]: [phone]  F: [phone]      Physical Therapy Daily Treatment Note    Date:  10/19/2023  Patient Name:  Reji Mills    :  1950  MRN: [id number]  Physician: Nina Lugo DO                             Insurance: [facility] Medicare   Medical Diagnosis:   M54.2 (ICD-10-CM) - Neck pain   H53.149 (ICD-10-CM) - Photophobia   Rehab Codes:   M54.2 (ICD-10-CM) - Neck pain   H53.149 (ICD-10-CM) - Photophobia   M62.81  Onset Date: 23; see below                   Next 's appt.: 23;   Visit# / total visits:      Cancels/No Shows: 0/0    Subjective:    Pain:  [x] Yes  [] No  Location: Neck   Pain Rating: (0-10 scale) 0/10 at rest  Pain altered Tx:  [x] No  [] Yes  Action: N/A  Comments: Pt stated that he is not having any pain and that this is the best he has

## 2023-10-25 ENCOUNTER — HOSPITAL ENCOUNTER (OUTPATIENT)
Dept: PHYSICAL THERAPY | Facility: CLINIC | Age: 73
Setting detail: THERAPIES SERIES
Discharge: HOME | End: 2023-10-25
Payer: MEDICARE

## 2023-10-25 PROCEDURE — 97110 THERAPEUTIC EXERCISES: CPT

## 2023-10-25 PROCEDURE — 97140 MANUAL THERAPY 1/> REGIONS: CPT

## 2023-10-25 NOTE — FLOWSHEET NOTE
understanding. [] Demonstrates understanding. [x] Needs review. [] Demonstrates/verbalizes HEP/Ed previously given. Access Code: [email or web address]  URL: [email or web address]  Date: 10/19/2023  Prepared by: [facility]    Exercises  - Doorway Pec Stretch at 60 Elevation  - 1 x daily - 7 x weekly - 1 sets - 3 reps - 30 hold  - Doorway Pec Stretch at 120 Degrees Abduction  - 1 x daily - 7 x weekly - 1 sets - 3 reps - 30 sec  hold  - Standing Bilateral Low Shoulder Row with Anchored Resistance  - 1 x daily - 7 x weekly - 2-3 sets - 10 reps  - Shoulder extension with resistance - Neutral  - 2-3 x daily - 7 x weekly - 2-3 sets - 10 reps  - Shoulder External Rotation with Anchored Resistance  - 2-3 x daily - 7 x weekly - 2-3 sets - 10 reps  - Standing Shoulder Internal Rotation with Anchored Resistance  - 2-3 x daily - 7 x weekly - 2-3 sets - 10 reps  - Shoulder Flexion Wall Slide with Towel  - 2-3 x daily - 7 x weekly - 2-3 sets - 10 reps  - Wall [place]  - 2-3 x daily - 7 x weekly - 2-3 sets - 10 reps  - Standing Shoulder Flexion to 90 Degrees with Dumbbells  - 2-3 x daily - 7 x weekly - 2-3 sets - 10 reps  - Shoulder Abduction with Dumbbells - Thumbs Up  - 2-3 x daily - 7 x weekly - 2-3 sets - 10 reps  - Standing Shoulder Scaption  - 2-3 x daily - 7 x weekly - 2-3 sets - 10 reps     Plan: [x] Continue current frequency toward long and short term goals. [x] Specific Instructions for subsequent treatments: Continue at x1 week, for remainder of PT POC, in order to focus upon WB cervical, scapular strengthening interventions.       Frequency: 2 x/week for 12 visits      Time In: 1441            Time Out: 8017    Electronically signed by:  Tonya Youngblood PTA

## 2023-11-03 ENCOUNTER — HOSPITAL ENCOUNTER (OUTPATIENT)
Dept: PHYSICAL THERAPY | Facility: CLINIC | Age: 73
Setting detail: THERAPIES SERIES
Discharge: HOME | End: 2023-11-03
Payer: MEDICARE

## 2023-11-03 PROCEDURE — 97110 THERAPEUTIC EXERCISES: CPT

## 2023-11-03 NOTE — DISCHARGE SUMMARY
Improve NDI to <18% impaired or limited- MET- 2% impaired or limited      Patient goals: \"relief from pain. \"- MET    Treatment to Date:  [x] Therapeutic Exercise    [x] Modalities:  [x] Therapeutic Activity    [] Ultrasound  [] Electrical Stimulation  [] Gait Training     [] Massage       [] Lumbar/Cervical Traction  [] Neuromuscular Re-education [x] Cold/hotpack [] Iontophoresis: 4 mg/mL  [x] Instruction in Home Exercise Program                     Dexamethasone Sodium  [x] Manual Therapy             Phosphate 40-80 mAmin  [] Aquatic Therapy                   [] Vasocompression/    [] Other:             Game Ready    Discharge Status:     [x] Pt recovered from conditions. Treatment goals were met. [x] Pt received maximum benefit. No further therapy indicated at this time. [x] Pt to continue exercise/home instructions independently. [] Therapy interrupted due to:    [] Pt has 2 or more no shows/cancels, is discontinued per our policy. [] Pt has completed prescribed number of treatment sessions. [] Other:     Electronically signed by Jasson Parsons PT on 11/3/2023 at 9:48 AM    If you have any questions or concerns, please don't hesitate to call.   Thank you for your referral.

## 2023-11-03 NOTE — FLOWSHEET NOTE
[] Gait     []  Other     Total Treatment time 32 2     [x7' on UBE]    Assessment: [x] Progressing toward goals. Pt presents without P or symptoms, and reports improvements overall with PT services thus far. Also reports improving light sensitivity- less intense, less frequent episodes. Agreeable to discharge today from PT services. Therefore, reviewed progressed interventions in order to ensure proper form while at home. Finished with re-assessment of B shoulder gross strength- 4+/5 for all motions and denies inc cervical, shoulder pain. Discharge today from PT services. [] No change. [] Other:  [] Patient would continue to benefit from skilled physical therapy services in order to: inc cervical spine AROM- all motions; inc gross shoulder/scapular strength; and overall improve tolerance for riding his , leaning over his kitchen counters, golf. STG: (to be met in 6 treatments)  ? Pain: Patient will report <7/10 pain with active movement in order to improve QOL.- MET  ? ROM: Will demo 5-10 deg inc in all cervical spine AROM- especially R side motions- in order to improve functional mobility.- PROGRESSING- minor increases in range, however, denies all P and symptoms with all motions    ? Strength: Will demo 4+/5 B gross shoulder strength, 4/5 B scapular strength, in order to better support cervical spine and prevent future injuries. Deep cervical flexor test without complaints at the finish. - PROGRESSING- 4+/5 B shoulder gross MMT without inc pain or symptoms    ? Function: Patient will report decreased TTP to B ELIEL in order to indicate decreased inflammation and improved healing of injured site. - MET  Patient to be independent with home exercise program as demonstrated by performance with correct form without cues. - MET  LTG: (to be met in 12 treatments)  Will ride , stand over kitchen counters to perform tasks for x20 minutes with <51/0 P and symptoms for improved QOL and IADLS. -

## 2023-12-13 SDOH — ECONOMIC STABILITY: INCOME INSECURITY: HOW HARD IS IT FOR YOU TO PAY FOR THE VERY BASICS LIKE FOOD, HOUSING, MEDICAL CARE, AND HEATING?: NOT HARD AT ALL

## 2023-12-13 SDOH — ECONOMIC STABILITY: FOOD INSECURITY: WITHIN THE PAST 12 MONTHS, YOU WORRIED THAT YOUR FOOD WOULD RUN OUT BEFORE YOU GOT MONEY TO BUY MORE.: NEVER TRUE

## 2023-12-13 SDOH — ECONOMIC STABILITY: FOOD INSECURITY: WITHIN THE PAST 12 MONTHS, THE FOOD YOU BOUGHT JUST DIDN'T LAST AND YOU DIDN'T HAVE MONEY TO GET MORE.: NEVER TRUE

## 2023-12-13 SDOH — ECONOMIC STABILITY: TRANSPORTATION INSECURITY
IN THE PAST 12 MONTHS, HAS LACK OF TRANSPORTATION KEPT YOU FROM MEETINGS, WORK, OR FROM GETTING THINGS NEEDED FOR DAILY LIVING?: NO

## 2023-12-13 SDOH — HEALTH STABILITY: PHYSICAL HEALTH: ON AVERAGE, HOW MANY DAYS PER WEEK DO YOU ENGAGE IN MODERATE TO STRENUOUS EXERCISE (LIKE A BRISK WALK)?: 1 DAY

## 2023-12-13 ASSESSMENT — LIFESTYLE VARIABLES
HAVE YOU OR SOMEONE ELSE BEEN INJURED AS A RESULT OF YOUR DRINKING: 0
HOW MANY STANDARD DRINKS CONTAINING ALCOHOL DO YOU HAVE ON A TYPICAL DAY: 1 OR 2
HOW OFTEN DURING THE LAST YEAR HAVE YOU BEEN UNABLE TO REMEMBER WHAT HAPPENED THE NIGHT BEFORE BECAUSE YOU HAD BEEN DRINKING: NEVER
HOW OFTEN DURING THE LAST YEAR HAVE YOU HAD A FEELING OF GUILT OR REMORSE AFTER DRINKING: NEVER
HOW MANY STANDARD DRINKS CONTAINING ALCOHOL DO YOU HAVE ON A TYPICAL DAY: 1
HOW OFTEN DURING THE LAST YEAR HAVE YOU NEEDED AN ALCOHOLIC DRINK FIRST THING IN THE MORNING TO GET YOURSELF GOING AFTER A NIGHT OF HEAVY DRINKING: 0
HAS A RELATIVE, FRIEND, DOCTOR, OR ANOTHER HEALTH PROFESSIONAL EXPRESSED CONCERN ABOUT YOUR DRINKING OR SUGGESTED YOU CUT DOWN: NO
HOW OFTEN DURING THE LAST YEAR HAVE YOU FOUND THAT YOU WERE NOT ABLE TO STOP DRINKING ONCE YOU HAD STARTED: 0
HAS A RELATIVE, FRIEND, DOCTOR, OR ANOTHER HEALTH PROFESSIONAL EXPRESSED CONCERN ABOUT YOUR DRINKING OR SUGGESTED YOU CUT DOWN: 0
HOW OFTEN DURING THE LAST YEAR HAVE YOU FAILED TO DO WHAT WAS NORMALLY EXPECTED FROM YOU BECAUSE OF DRINKING: NEVER
HOW OFTEN DO YOU HAVE A DRINK CONTAINING ALCOHOL: 5
HOW OFTEN DO YOU HAVE A DRINK CONTAINING ALCOHOL: 4 OR MORE TIMES A WEEK
HOW OFTEN DURING THE LAST YEAR HAVE YOU HAD A FEELING OF GUILT OR REMORSE AFTER DRINKING: 0
HOW OFTEN DURING THE LAST YEAR HAVE YOU FOUND THAT YOU WERE NOT ABLE TO STOP DRINKING ONCE YOU HAD STARTED: NEVER
HOW OFTEN DO YOU HAVE SIX OR MORE DRINKS ON ONE OCCASION: 2
HAVE YOU OR SOMEONE ELSE BEEN INJURED AS A RESULT OF YOUR DRINKING: NO
HOW OFTEN DURING THE LAST YEAR HAVE YOU BEEN UNABLE TO REMEMBER WHAT HAPPENED THE NIGHT BEFORE BECAUSE YOU HAD BEEN DRINKING: 0
HOW OFTEN DURING THE LAST YEAR HAVE YOU FAILED TO DO WHAT WAS NORMALLY EXPECTED FROM YOU BECAUSE OF DRINKING: 0
HOW OFTEN DURING THE LAST YEAR HAVE YOU NEEDED AN ALCOHOLIC DRINK FIRST THING IN THE MORNING TO GET YOURSELF GOING AFTER A NIGHT OF HEAVY DRINKING: NEVER

## 2023-12-13 ASSESSMENT — PATIENT HEALTH QUESTIONNAIRE - PHQ9
2. FEELING DOWN, DEPRESSED OR HOPELESS: 0
1. LITTLE INTEREST OR PLEASURE IN DOING THINGS: 0
SUM OF ALL RESPONSES TO PHQ QUESTIONS 1-9: 0
SUM OF ALL RESPONSES TO PHQ9 QUESTIONS 1 & 2: 0
SUM OF ALL RESPONSES TO PHQ QUESTIONS 1-9: 0

## 2023-12-14 ENCOUNTER — OFFICE VISIT (OUTPATIENT)
Dept: PRIMARY CARE CLINIC | Age: 73
End: 2023-12-14
Payer: MEDICARE

## 2023-12-14 VITALS
DIASTOLIC BLOOD PRESSURE: 68 MMHG | OXYGEN SATURATION: 97 % | WEIGHT: 233 LBS | BODY MASS INDEX: 33.43 KG/M2 | SYSTOLIC BLOOD PRESSURE: 118 MMHG | HEART RATE: 82 BPM

## 2023-12-14 DIAGNOSIS — R73.01 IMPAIRED FASTING BLOOD SUGAR: ICD-10-CM

## 2023-12-14 DIAGNOSIS — I10 PRIMARY HYPERTENSION: ICD-10-CM

## 2023-12-14 DIAGNOSIS — Z00.00 MEDICARE ANNUAL WELLNESS VISIT, SUBSEQUENT: Primary | ICD-10-CM

## 2023-12-14 DIAGNOSIS — Z95.1 S/P CABG X 4: ICD-10-CM

## 2023-12-14 DIAGNOSIS — E87.5 HYPERKALEMIA: ICD-10-CM

## 2023-12-14 DIAGNOSIS — Z95.5 S/P CORONARY ARTERY STENT PLACEMENT: ICD-10-CM

## 2023-12-14 PROCEDURE — 1123F ACP DISCUSS/DSCN MKR DOCD: CPT | Performed by: FAMILY MEDICINE

## 2023-12-14 PROCEDURE — 3078F DIAST BP <80 MM HG: CPT | Performed by: FAMILY MEDICINE

## 2023-12-14 PROCEDURE — G0439 PPPS, SUBSEQ VISIT: HCPCS | Performed by: FAMILY MEDICINE

## 2023-12-14 PROCEDURE — 3074F SYST BP LT 130 MM HG: CPT | Performed by: FAMILY MEDICINE

## 2023-12-14 NOTE — PATIENT INSTRUCTIONS
\"Exercise & Physical Activity: Your Everyday Guide\" from [employer]. Call 8-671.525.4587 or search [employer] online. You need 3559-2640 mg of calcium and 1883-9055 IU of vitamin D per day. It is possible to meet your calcium requirement with diet alone, but a vitamin D supplement is usually necessary to meet this goal.  When exposed to the sun, use a sunscreen that protects against both UVA and UVB radiation with an SPF of 30 or greater. Reapply every 2 to 3 hours or after sweating, drying off with a towel, or swimming. Always wear a seat belt when traveling in a car. Always wear a helmet when riding a bicycle or motorcycle.

## 2023-12-14 NOTE — PROGRESS NOTES
capsule Take 1 capsule by mouth 3 times daily as needed for Cough  Arturo Bowie DO   doxycycline hyclate (VIBRA-TABS) 100 MG tablet Take 1 tablet by mouth 2 times daily  ORAL Holt - CNP   metoprolol succinate (TOPROL XL) 25 MG extended release tablet TAKE ONE TABLET BY MOUTH TWICE A DAY  Apolinar Archibald MD   simvastatin (ZOCOR) 80 MG tablet   ProviderDavida MD   potassium chloride (KLOR-CON M) 20 MEQ TBCR extended release tablet Take 1 tablet by mouth daily  Apolinar Archibald MD   lisinopril (PRINIVIL;ZESTRIL) 10 MG tablet TAKE ONE TABLET BY MOUTH TWICE A DAY  Apolinar Archibald MD   potassium chloride (KLOR-CON M) 20 MEQ extended release tablet Take 2 tablets by mouth daily for 7 days  Melinda Marrero DO   aspirin (ASPIRIN CHILDRENS) 81 MG chewable tablet Take 1 tablet by mouth daily  Tonia Santiago MD   nitroGLYCERIN (NITROSTAT) 0.4 MG SL tablet Place 1 tablet under the tongue every 5 minutes as needed for Chest pain  Patient not taking: Reported on 8/10/2023  Apolinar Archibald MD   isosorbide mononitrate (IMDUR) 30 MG CR tablet Take 1 tablet by mouth daily  Davida Banuelos MD   tamsulosin (FLOMAX) 0.4 MG capsule Take 1 capsule by mouth daily  ProviderDavida MD CareTeam (Including outside providers/suppliers regularly involved in providing care):   Patient Care Team:  Nemesio Juan DO as PCP - General (Family Medicine)  Nemesio Juan DO as PCP - Empaneled Provider     Reviewed and updated this visit:  Tobacco  Allergies  Meds  Med Hx  Surg Hx  Soc Hx  Fam Hx

## 2023-12-15 ENCOUNTER — HOSPITAL ENCOUNTER (OUTPATIENT)
Age: 73
Setting detail: SPECIMEN
Discharge: HOME | End: 2023-12-15

## 2023-12-15 DIAGNOSIS — R73.01 IMPAIRED FASTING BLOOD SUGAR: ICD-10-CM

## 2023-12-15 DIAGNOSIS — I10 PRIMARY HYPERTENSION: ICD-10-CM

## 2023-12-15 DIAGNOSIS — E87.5 HYPERKALEMIA: ICD-10-CM

## 2023-12-15 LAB
BASOPHILS # BLD: 0.07 K/UL (ref 0–0.2)
BASOPHILS NFR BLD: 1 % (ref 0–2)
EOSINOPHIL # BLD: 0.33 K/UL (ref 0–0.44)
EOSINOPHILS RELATIVE PERCENT: 4 % (ref 1–4)
ERYTHROCYTE [DISTWIDTH] IN BLOOD BY AUTOMATED COUNT: 14.7 % (ref 11.8–14.4)
HCT VFR BLD AUTO: 43.5 % (ref 40.7–50.3)
HGB BLD-MCNC: 13.9 G/DL (ref 13–17)
IMM GRANULOCYTES # BLD AUTO: <0.03 K/UL (ref 0–0.3)
IMM GRANULOCYTES NFR BLD: 0 %
LYMPHOCYTES NFR BLD: 1.46 K/UL (ref 1.1–3.7)
LYMPHOCYTES RELATIVE PERCENT: 18 % (ref 24–43)
MCH RBC QN AUTO: 31.2 PG (ref 25.2–33.5)
MCHC RBC AUTO-ENTMCNC: 32 G/DL (ref 28.4–34.8)
MCV RBC AUTO: 97.8 FL (ref 82.6–102.9)
MONOCYTES NFR BLD: 0.54 K/UL (ref 0.1–1.2)
MONOCYTES NFR BLD: 7 % (ref 3–12)
NEUTROPHILS NFR BLD: 70 % (ref 36–65)
NEUTS SEG NFR BLD: 5.54 K/UL (ref 1.5–8.1)
NRBC BLD-RTO: 0 PER 100 WBC
PLATELET # BLD AUTO: 160 K/UL (ref 138–453)
PMV BLD AUTO: 9.6 FL (ref 8.1–13.5)
RBC # BLD AUTO: 4.45 M/UL (ref 4.21–5.77)
RBC # BLD: ABNORMAL 10*6/UL
WBC OTHER # BLD: 8 K/UL (ref 3.5–11.3)

## 2023-12-16 LAB
ANION GAP SERPL CALCULATED.3IONS-SCNC: 12 MMOL/L (ref 9–17)
BUN SERPL-MCNC: 26 MG/DL (ref 8–23)
CALCIUM SERPL-MCNC: 9.5 MG/DL (ref 8.6–10.4)
CHLORIDE SERPL-SCNC: 100 MMOL/L (ref 98–107)
CO2 SERPL-SCNC: 26 MMOL/L (ref 20–31)
CREAT SERPL-MCNC: 1.1 MG/DL (ref 0.7–1.2)
EST. AVERAGE GLUCOSE BLD GHB EST-MCNC: 94 MG/DL
GFR SERPL CREATININE-BSD FRML MDRD: >60 ML/MIN/1.73M2
GLUCOSE SERPL-MCNC: 99 MG/DL (ref 70–99)
HBA1C MFR BLD: 4.9 % (ref 4–6)
POTASSIUM SERPL-SCNC: 5 MMOL/L (ref 3.7–5.3)
SODIUM SERPL-SCNC: 138 MMOL/L (ref 135–144)

## 2024-03-06 ENCOUNTER — OFFICE VISIT (OUTPATIENT)
Dept: FAMILY MEDICINE CLINIC | Age: 74
End: 2024-03-06

## 2024-03-06 VITALS
DIASTOLIC BLOOD PRESSURE: 78 MMHG | SYSTOLIC BLOOD PRESSURE: 122 MMHG | BODY MASS INDEX: 32.94 KG/M2 | WEIGHT: 229.6 LBS | HEART RATE: 73 BPM | OXYGEN SATURATION: 99 %

## 2024-03-06 DIAGNOSIS — M54.2 NECK PAIN, MUSCULOSKELETAL: Primary | ICD-10-CM

## 2024-03-06 RX ORDER — METHYLPREDNISOLONE 4 MG/1
TABLET ORAL
Qty: 1 KIT | Refills: 0 | Status: SHIPPED | OUTPATIENT
Start: 2024-03-06 | End: 2024-03-12

## 2024-03-06 ASSESSMENT — ENCOUNTER SYMPTOMS
TROUBLE SWALLOWING: 0
ABDOMINAL PAIN: 0
BACK PAIN: 0
RHINORRHEA: 0
SORE THROAT: 0
NAUSEA: 0
VOMITING: 0
WHEEZING: 0
COUGH: 0
SHORTNESS OF BREATH: 0

## 2024-03-06 ASSESSMENT — PATIENT HEALTH QUESTIONNAIRE - PHQ9
SUM OF ALL RESPONSES TO PHQ9 QUESTIONS 1 & 2: 0
1. LITTLE INTEREST OR PLEASURE IN DOING THINGS: 0
SUM OF ALL RESPONSES TO PHQ QUESTIONS 1-9: 0
2. FEELING DOWN, DEPRESSED OR HOPELESS: 0
SUM OF ALL RESPONSES TO PHQ QUESTIONS 1-9: 0

## 2024-03-06 NOTE — PROGRESS NOTES
continue current medications, diet and exercise.    Patient agreed with treatment plan. Follow up as directed.     Electronically signed by ORAL Sweet CNP on 3/6/2024 at 8:47 AM

## 2024-03-20 ENCOUNTER — OFFICE VISIT (OUTPATIENT)
Dept: PRIMARY CARE CLINIC | Age: 74
End: 2024-03-20

## 2024-03-20 VITALS
HEART RATE: 67 BPM | WEIGHT: 226 LBS | BODY MASS INDEX: 32.43 KG/M2 | SYSTOLIC BLOOD PRESSURE: 102 MMHG | OXYGEN SATURATION: 100 % | DIASTOLIC BLOOD PRESSURE: 64 MMHG

## 2024-03-20 DIAGNOSIS — R63.4 WEIGHT LOSS: ICD-10-CM

## 2024-03-20 DIAGNOSIS — R25.1 TREMOR: ICD-10-CM

## 2024-03-20 DIAGNOSIS — R13.10 DYSPHAGIA, UNSPECIFIED TYPE: ICD-10-CM

## 2024-03-20 DIAGNOSIS — M54.2 CHRONIC NECK PAIN: ICD-10-CM

## 2024-03-20 DIAGNOSIS — G89.29 CHRONIC NECK PAIN: ICD-10-CM

## 2024-03-20 DIAGNOSIS — M47.812 OSTEOARTHRITIS OF CERVICAL SPINE, UNSPECIFIED SPINAL OSTEOARTHRITIS COMPLICATION STATUS: ICD-10-CM

## 2024-03-20 DIAGNOSIS — M54.12 CERVICAL RADICULOPATHY: ICD-10-CM

## 2024-03-20 DIAGNOSIS — R19.7 DIARRHEA, UNSPECIFIED TYPE: Primary | ICD-10-CM

## 2024-03-20 NOTE — PROGRESS NOTES
DO Lobo, Neurosurgery, [facility]     Referral Priority:   Routine     Referral Type:   Eval and Treat     Referral Reason:   Specialty Services Required     Referred to Provider:   Lobo Zacarias DO     Requested Specialty:   Neurosurgery     Number of Visits Requested:   1     No orders of the defined types were placed in this encounter.       Patient given educational materials - see patient instructions.  Discussed use, benefit, and side effects of prescribed medications.  All patient questions answered. Pt voiced understanding. Reviewed healthmaintenance.  Instructed to continue current medications, diet and exercise.  Patient agreed with treatment plan. Follow up as directed.     Electronically signed by Diane Bowie DO on 3/29/2024 at 5:41 PM

## 2024-03-21 ENCOUNTER — TELEPHONE (OUTPATIENT)
Dept: GASTROENTEROLOGY | Age: 74
End: 2024-03-21

## 2024-03-21 RX ORDER — SODIUM, POTASSIUM,MAG SULFATES 17.5-3.13G
SOLUTION, RECONSTITUTED, ORAL ORAL
Qty: 1 EACH | Refills: 0 | Status: SHIPPED | OUTPATIENT
Start: 2024-03-21

## 2024-03-21 NOTE — TELEPHONE ENCOUNTER
Procedure scheduled/Hamdani  Procedure:Colonoscopy/screening(wq)  Dx:colon screening  Ordering:pcp  Date:05/03/2024  Time:9:30am Arrive: 8:00am  Hospital:[facility] Registration 2nd floor  Bowel prep instructions sent to patient:  Patient advised by phone/mail

## 2024-03-26 ENCOUNTER — HOSPITAL ENCOUNTER (OUTPATIENT)
Dept: MRI IMAGING | Age: 74
Discharge: HOME | End: 2024-03-28
Payer: MEDICARE

## 2024-03-26 DIAGNOSIS — G89.29 CHRONIC NECK PAIN: ICD-10-CM

## 2024-03-26 DIAGNOSIS — M54.2 CHRONIC NECK PAIN: ICD-10-CM

## 2024-03-26 DIAGNOSIS — M54.12 CERVICAL RADICULOPATHY: ICD-10-CM

## 2024-03-26 PROCEDURE — 72141 MRI NECK SPINE W/O DYE: CPT

## 2024-03-27 ENCOUNTER — HOSPITAL ENCOUNTER (OUTPATIENT)
Age: 74
Setting detail: SPECIMEN
Discharge: HOME | End: 2024-03-27

## 2024-03-27 DIAGNOSIS — R19.7 DIARRHEA, UNSPECIFIED TYPE: ICD-10-CM

## 2024-03-27 LAB — LACTOFERRIN STL QL: NORMAL

## 2024-03-28 LAB
C PARVUM AG STL QL IA: NEGATIVE
G LAMBLIA AG STL QL IA: NEGATIVE
SOURCE: NORMAL
SPECIMEN DESCRIPTION: NORMAL

## 2024-03-29 ENCOUNTER — HOSPITAL ENCOUNTER (INPATIENT)
Age: 74
LOS: 4 days | Discharge: HOME | DRG: 682 | End: 2024-04-02
Attending: EMERGENCY MEDICINE | Admitting: INTERNAL MEDICINE
Payer: MEDICARE

## 2024-03-29 ENCOUNTER — APPOINTMENT (OUTPATIENT)
Dept: ULTRASOUND IMAGING | Age: 74
DRG: 682 | End: 2024-03-29
Payer: MEDICARE

## 2024-03-29 ENCOUNTER — OFFICE VISIT (OUTPATIENT)
Dept: NEUROSURGERY | Age: 74
End: 2024-03-29
Payer: MEDICARE

## 2024-03-29 ENCOUNTER — APPOINTMENT (OUTPATIENT)
Dept: GENERAL RADIOLOGY | Age: 74
DRG: 682 | End: 2024-03-29
Payer: MEDICARE

## 2024-03-29 VITALS
BODY MASS INDEX: 31.64 KG/M2 | HEART RATE: 92 BPM | HEIGHT: 70 IN | DIASTOLIC BLOOD PRESSURE: 37 MMHG | WEIGHT: 221 LBS | SYSTOLIC BLOOD PRESSURE: 65 MMHG

## 2024-03-29 DIAGNOSIS — G95.9 CERVICAL MYELOPATHY WITH CERVICAL RADICULOPATHY (HCC): ICD-10-CM

## 2024-03-29 DIAGNOSIS — I95.9 HYPOTENSION, UNSPECIFIED HYPOTENSION TYPE: Primary | ICD-10-CM

## 2024-03-29 DIAGNOSIS — E87.21 ACUTE METABOLIC ACIDOSIS: ICD-10-CM

## 2024-03-29 DIAGNOSIS — N17.9 AKI (ACUTE KIDNEY INJURY) (HCC): ICD-10-CM

## 2024-03-29 DIAGNOSIS — M48.02 CERVICAL SPINAL STENOSIS: Primary | ICD-10-CM

## 2024-03-29 DIAGNOSIS — R79.89 ELEVATED SERUM CREATININE: ICD-10-CM

## 2024-03-29 DIAGNOSIS — M54.12 CERVICAL MYELOPATHY WITH CERVICAL RADICULOPATHY (HCC): ICD-10-CM

## 2024-03-29 DIAGNOSIS — E87.5 HYPERKALEMIA: ICD-10-CM

## 2024-03-29 DIAGNOSIS — Z95.5 S/P DRUG ELUTING CORONARY STENT PLACEMENT: ICD-10-CM

## 2024-03-29 LAB
ALBUMIN SERPL-MCNC: 4.6 G/DL (ref 3.5–5.2)
ALBUMIN/GLOB SERPL: 2 {RATIO} (ref 1–2.5)
ALLEN TEST: POSITIVE
ALP SERPL-CCNC: 78 U/L (ref 40–129)
ALT SERPL-CCNC: 19 U/L (ref 10–50)
AMPHET UR QL SCN: NEGATIVE
ANION GAP SERPL CALCULATED.3IONS-SCNC: 11 MMOL/L (ref 9–16)
ANION GAP SERPL CALCULATED.3IONS-SCNC: 13 MMOL/L (ref 9–16)
ANION GAP SERPL CALCULATED.3IONS-SCNC: 13 MMOL/L (ref 9–16)
AST SERPL-CCNC: 17 U/L (ref 10–50)
BARBITURATES UR QL SCN: NEGATIVE
BASOPHILS # BLD: 0.04 K/UL (ref 0–0.2)
BASOPHILS NFR BLD: 1 % (ref 0–2)
BENZODIAZ UR QL: NEGATIVE
BILIRUB SERPL-MCNC: 0.3 MG/DL (ref 0–1.2)
BILIRUB UR QL STRIP: NEGATIVE
BODY TEMPERATURE: 37
BUN BLD-MCNC: 85 MG/DL (ref 8–26)
BUN BLD-MCNC: 95 MG/DL (ref 8–26)
BUN SERPL-MCNC: 103 MG/DL (ref 8–23)
BUN SERPL-MCNC: 88 MG/DL (ref 8–23)
BUN SERPL-MCNC: 97 MG/DL (ref 8–23)
C3 SERPL-MCNC: 119 MG/DL (ref 90–180)
C4 SERPL-MCNC: 31 MG/DL (ref 10–40)
CA-I BLD-SCNC: 1.24 MMOL/L (ref 1.13–1.33)
CA-I BLD-SCNC: 1.31 MMOL/L (ref 1.15–1.33)
CA-I BLD-SCNC: 1.41 MMOL/L (ref 1.15–1.33)
CALCIUM SERPL-MCNC: 8.4 MG/DL (ref 8.6–10.4)
CALCIUM SERPL-MCNC: 8.9 MG/DL (ref 8.6–10.4)
CALCIUM SERPL-MCNC: 9.3 MG/DL (ref 8.6–10.4)
CANNABINOIDS UR QL SCN: NEGATIVE
CHLORIDE BLD-SCNC: 115 MMOL/L (ref 98–107)
CHLORIDE BLD-SCNC: 119 MMOL/L (ref 98–107)
CHLORIDE SERPL-SCNC: 112 MMOL/L (ref 98–107)
CHLORIDE SERPL-SCNC: 113 MMOL/L (ref 98–107)
CHLORIDE SERPL-SCNC: 114 MMOL/L (ref 98–107)
CLARITY UR: CLEAR
CO2 BLD CALC-SCNC: 11 MMOL/L (ref 22–30)
CO2 BLD CALC-SCNC: 16 MMOL/L (ref 22–30)
CO2 SERPL-SCNC: 11 MMOL/L (ref 20–31)
CO2 SERPL-SCNC: 12 MMOL/L (ref 20–31)
CO2 SERPL-SCNC: 13 MMOL/L (ref 20–31)
COCAINE UR QL SCN: NEGATIVE
COHGB MFR BLD: 4.8 % (ref 0–5)
COLOR UR: YELLOW
COMMENT: ABNORMAL
CORTIS SERPL-MCNC: 12.1 UG/DL (ref 2.5–19.5)
CREAT SERPL-MCNC: 3 MG/DL (ref 0.7–1.2)
CREAT SERPL-MCNC: 3.4 MG/DL (ref 0.7–1.2)
CREAT SERPL-MCNC: 4.1 MG/DL (ref 0.7–1.2)
CREAT UR-MCNC: 209 MG/DL (ref 39–259)
EGFR, POC: 20 ML/MIN/1.73M2
EGFR, POC: 22 ML/MIN/1.73M2
EOSINOPHIL # BLD: 0.18 K/UL (ref 0–0.44)
EOSINOPHILS RELATIVE PERCENT: 3 % (ref 1–4)
ERYTHROCYTE [DISTWIDTH] IN BLOOD BY AUTOMATED COUNT: 15.8 % (ref 11.8–14.4)
FENTANYL UR QL: NEGATIVE
FIO2 ON VENT: ABNORMAL %
FIO2: 21
GFR SERPL CREATININE-BSD FRML MDRD: 15 ML/MIN/1.73M2
GFR SERPL CREATININE-BSD FRML MDRD: 19 ML/MIN/1.73M2
GFR SERPL CREATININE-BSD FRML MDRD: 22 ML/MIN/1.73M2
GLUCOSE BLD-MCNC: 118 MG/DL (ref 74–100)
GLUCOSE BLD-MCNC: 88 MG/DL (ref 75–110)
GLUCOSE BLD-MCNC: 89 MG/DL (ref 74–100)
GLUCOSE SERPL-MCNC: 114 MG/DL (ref 74–99)
GLUCOSE SERPL-MCNC: 125 MG/DL (ref 74–99)
GLUCOSE SERPL-MCNC: 92 MG/DL (ref 74–99)
GLUCOSE UR STRIP-MCNC: NEGATIVE MG/DL
HCO3 VENOUS: 11.9 MMOL/L (ref 24–30)
HCT VFR BLD AUTO: 21 % (ref 41–53)
HCT VFR BLD AUTO: 23 % (ref 41–53)
HCT VFR BLD AUTO: 30.2 % (ref 40.7–50.3)
HGB BLD-MCNC: 9.6 G/DL (ref 13–17)
HGB UR QL STRIP.AUTO: NEGATIVE
IMM GRANULOCYTES # BLD AUTO: <0.03 K/UL (ref 0–0.3)
IMM GRANULOCYTES NFR BLD: 0 %
KETONES UR STRIP-MCNC: ABNORMAL MG/DL
LACTIC ACID, WHOLE BLOOD: 1 MMOL/L (ref 0.7–2.1)
LEUKOCYTE ESTERASE UR QL STRIP: NEGATIVE
LYMPHOCYTES NFR BLD: 0.77 K/UL (ref 1.1–3.7)
LYMPHOCYTES RELATIVE PERCENT: 12 % (ref 24–43)
MAGNESIUM SERPL-MCNC: 2.1 MG/DL (ref 1.6–2.4)
MAGNESIUM SERPL-MCNC: 2.2 MG/DL (ref 1.6–2.4)
MCH RBC QN AUTO: 33.4 PG (ref 25.2–33.5)
MCHC RBC AUTO-ENTMCNC: 31.8 G/DL (ref 28.4–34.8)
MCV RBC AUTO: 105.2 FL (ref 82.6–102.9)
METHADONE UR QL: NEGATIVE
MONOCYTES NFR BLD: 0.45 K/UL (ref 0.1–1.2)
MONOCYTES NFR BLD: 7 % (ref 3–12)
NEGATIVE BASE EXCESS, ART: 15.8 MMOL/L (ref 0–2)
NEGATIVE BASE EXCESS, ART: 9.1 MMOL/L (ref 0–2)
NEGATIVE BASE EXCESS, VEN: 14.8 MMOL/L (ref 0–2)
NEUTROPHILS NFR BLD: 77 % (ref 36–65)
NEUTS SEG NFR BLD: 4.86 K/UL (ref 1.5–8.1)
NITRITE UR QL STRIP: NEGATIVE
NRBC BLD-RTO: 0 PER 100 WBC
O2 DELIVERY DEVICE: ABNORMAL
O2 SAT, VEN: 90.4 % (ref 60–85)
OPIATES UR QL SCN: NEGATIVE
OSMOLALITY UR: 360 MOSM/KG (ref 80–1300)
OXYCODONE UR QL SCN: NEGATIVE
PCO2, VEN: 32.2 MM HG (ref 39–55)
PCP UR QL SCN: NEGATIVE
PH UR STRIP: 5 [PH] (ref 5–8)
PH VENOUS: 7.19 (ref 7.32–7.42)
PLATELET # BLD AUTO: 111 K/UL (ref 138–453)
PMV BLD AUTO: 9.3 FL (ref 8.1–13.5)
PO2, VEN: 126 MM HG (ref 30–50)
POC ANION GAP: 7 MMOL/L (ref 7–16)
POC ANION GAP: 7 MMOL/L (ref 7–16)
POC CREATININE: 2.9 MG/DL (ref 0.51–1.19)
POC CREATININE: 3.2 MG/DL (ref 0.51–1.19)
POC HCO3: 10.9 MMOL/L (ref 21–28)
POC HCO3: 15.9 MMOL/L (ref 21–28)
POC HEMOGLOBIN (CALC): 7 G/DL (ref 13.5–17.5)
POC HEMOGLOBIN (CALC): 7.7 G/DL (ref 13.5–17.5)
POC LACTIC ACID: 0.3 MMOL/L (ref 0.56–1.39)
POC LACTIC ACID: 0.4 MMOL/L (ref 0.56–1.39)
POC O2 SATURATION: 95.4 % (ref 94–98)
POC O2 SATURATION: 96.8 % (ref 94–98)
POC PCO2: 28.5 MM HG (ref 35–48)
POC PCO2: 29.7 MM HG (ref 35–48)
POC PH: 7.19 (ref 7.35–7.45)
POC PH: 7.34 (ref 7.35–7.45)
POC PO2: 92.5 MM HG (ref 83–108)
POC PO2: 93.9 MM HG (ref 83–108)
POTASSIUM BLD-SCNC: 5 MMOL/L (ref 3.5–4.5)
POTASSIUM BLD-SCNC: 5.4 MMOL/L (ref 3.5–4.5)
POTASSIUM SERPL-SCNC: 5.4 MMOL/L (ref 3.7–5.3)
POTASSIUM SERPL-SCNC: 5.5 MMOL/L (ref 3.7–5.3)
POTASSIUM SERPL-SCNC: 6.6 MMOL/L (ref 3.7–5.3)
POTASSIUM, UR: 36.2 MMOL/L
PROT SERPL-MCNC: 7.1 G/DL (ref 6.6–8.7)
PROT UR STRIP-MCNC: NEGATIVE MG/DL
RBC # BLD AUTO: 2.87 M/UL (ref 4.21–5.77)
RBC # BLD: ABNORMAL 10*6/UL
RBC # BLD: ABNORMAL 10*6/UL
SAMPLE SITE: ABNORMAL
SAMPLE SITE: ABNORMAL
SODIUM BLD-SCNC: 136 MMOL/L (ref 138–146)
SODIUM BLD-SCNC: 137 MMOL/L (ref 138–146)
SODIUM SERPL-SCNC: 136 MMOL/L (ref 136–145)
SODIUM SERPL-SCNC: 138 MMOL/L (ref 136–145)
SODIUM SERPL-SCNC: 138 MMOL/L (ref 136–145)
SODIUM UR-SCNC: 34 MMOL/L
SP GR UR STRIP: 1.01 (ref 1–1.03)
TEST INFORMATION: NORMAL
TOTAL PROTEIN, URINE: 16 MG/DL
TROPONIN I SERPL HS-MCNC: 41 NG/L (ref 0–22)
TROPONIN I SERPL HS-MCNC: 49 NG/L (ref 0–22)
TROPONIN I SERPL HS-MCNC: 52 NG/L (ref 0–22)
URINE TOTAL PROTEIN CREATININE RATIO: 0.08
UROBILINOGEN UR STRIP-ACNC: NORMAL EU/DL (ref 0–1)
WBC OTHER # BLD: 6.3 K/UL (ref 3.5–11.3)

## 2024-03-29 PROCEDURE — 86225 DNA ANTIBODY NATIVE: CPT

## 2024-03-29 PROCEDURE — 81003 URINALYSIS AUTO W/O SCOPE: CPT

## 2024-03-29 PROCEDURE — 99223 1ST HOSP IP/OBS HIGH 75: CPT | Performed by: INTERNAL MEDICINE

## 2024-03-29 PROCEDURE — 85014 HEMATOCRIT: CPT

## 2024-03-29 PROCEDURE — 2500000003 HC RX 250 WO HCPCS: Performed by: INTERNAL MEDICINE

## 2024-03-29 PROCEDURE — 6360000002 HC RX W HCPCS

## 2024-03-29 PROCEDURE — 99285 EMERGENCY DEPT VISIT HI MDM: CPT

## 2024-03-29 PROCEDURE — 2000000000 HC ICU R&B

## 2024-03-29 PROCEDURE — 6370000000 HC RX 637 (ALT 250 FOR IP)

## 2024-03-29 PROCEDURE — 82947 ASSAY GLUCOSE BLOOD QUANT: CPT

## 2024-03-29 PROCEDURE — 82570 ASSAY OF URINE CREATININE: CPT

## 2024-03-29 PROCEDURE — 83516 IMMUNOASSAY NONANTIBODY: CPT

## 2024-03-29 PROCEDURE — 3078F DIAST BP <80 MM HG: CPT

## 2024-03-29 PROCEDURE — 2500000003 HC RX 250 WO HCPCS: Performed by: EMERGENCY MEDICINE

## 2024-03-29 PROCEDURE — 80048 BASIC METABOLIC PNL TOTAL CA: CPT

## 2024-03-29 PROCEDURE — 36415 COLL VENOUS BLD VENIPUNCTURE: CPT

## 2024-03-29 PROCEDURE — 96367 TX/PROPH/DG ADDL SEQ IV INF: CPT

## 2024-03-29 PROCEDURE — 99204 OFFICE O/P NEW MOD 45 MIN: CPT

## 2024-03-29 PROCEDURE — 3074F SYST BP LT 130 MM HG: CPT

## 2024-03-29 PROCEDURE — 84300 ASSAY OF URINE SODIUM: CPT

## 2024-03-29 PROCEDURE — 96375 TX/PRO/DX INJ NEW DRUG ADDON: CPT

## 2024-03-29 PROCEDURE — 71045 X-RAY EXAM CHEST 1 VIEW: CPT

## 2024-03-29 PROCEDURE — 2580000003 HC RX 258

## 2024-03-29 PROCEDURE — 6370000000 HC RX 637 (ALT 250 FOR IP): Performed by: EMERGENCY MEDICINE

## 2024-03-29 PROCEDURE — 82803 BLOOD GASES ANY COMBINATION: CPT

## 2024-03-29 PROCEDURE — 80051 ELECTROLYTE PANEL: CPT

## 2024-03-29 PROCEDURE — 86160 COMPLEMENT ANTIGEN: CPT

## 2024-03-29 PROCEDURE — 1123F ACP DISCUSS/DSCN MKR DOCD: CPT

## 2024-03-29 PROCEDURE — 82565 ASSAY OF CREATININE: CPT

## 2024-03-29 PROCEDURE — 83935 ASSAY OF URINE OSMOLALITY: CPT

## 2024-03-29 PROCEDURE — 83605 ASSAY OF LACTIC ACID: CPT

## 2024-03-29 PROCEDURE — 76770 US EXAM ABDO BACK WALL COMP: CPT

## 2024-03-29 PROCEDURE — 96368 THER/DIAG CONCURRENT INF: CPT

## 2024-03-29 PROCEDURE — 96366 THER/PROPH/DIAG IV INF ADDON: CPT

## 2024-03-29 PROCEDURE — 84520 ASSAY OF UREA NITROGEN: CPT

## 2024-03-29 PROCEDURE — 80307 DRUG TEST PRSMV CHEM ANLYZR: CPT

## 2024-03-29 PROCEDURE — 94761 N-INVAS EAR/PLS OXIMETRY MLT: CPT

## 2024-03-29 PROCEDURE — 51798 US URINE CAPACITY MEASURE: CPT

## 2024-03-29 PROCEDURE — 82330 ASSAY OF CALCIUM: CPT

## 2024-03-29 PROCEDURE — 82805 BLOOD GASES W/O2 SATURATION: CPT

## 2024-03-29 PROCEDURE — 84484 ASSAY OF TROPONIN QUANT: CPT

## 2024-03-29 PROCEDURE — 83521 IG LIGHT CHAINS FREE EACH: CPT

## 2024-03-29 PROCEDURE — 6360000002 HC RX W HCPCS: Performed by: EMERGENCY MEDICINE

## 2024-03-29 PROCEDURE — 2500000003 HC RX 250 WO HCPCS

## 2024-03-29 PROCEDURE — 93005 ELECTROCARDIOGRAM TRACING: CPT | Performed by: INTERNAL MEDICINE

## 2024-03-29 PROCEDURE — 84156 ASSAY OF PROTEIN URINE: CPT

## 2024-03-29 PROCEDURE — 2580000003 HC RX 258: Performed by: EMERGENCY MEDICINE

## 2024-03-29 PROCEDURE — 36600 WITHDRAWAL OF ARTERIAL BLOOD: CPT

## 2024-03-29 PROCEDURE — 86334 IMMUNOFIX E-PHORESIS SERUM: CPT

## 2024-03-29 PROCEDURE — 82533 TOTAL CORTISOL: CPT

## 2024-03-29 PROCEDURE — 83735 ASSAY OF MAGNESIUM: CPT

## 2024-03-29 PROCEDURE — 96365 THER/PROPH/DIAG IV INF INIT: CPT

## 2024-03-29 PROCEDURE — 87641 MR-STAPH DNA AMP PROBE: CPT

## 2024-03-29 PROCEDURE — 85025 COMPLETE CBC W/AUTO DIFF WBC: CPT

## 2024-03-29 PROCEDURE — 86038 ANTINUCLEAR ANTIBODIES: CPT

## 2024-03-29 PROCEDURE — 99291 CRITICAL CARE FIRST HOUR: CPT | Performed by: INTERNAL MEDICINE

## 2024-03-29 PROCEDURE — 80053 COMPREHEN METABOLIC PANEL: CPT

## 2024-03-29 PROCEDURE — 84133 ASSAY OF URINE POTASSIUM: CPT

## 2024-03-29 PROCEDURE — 2580000003 HC RX 258: Performed by: INTERNAL MEDICINE

## 2024-03-29 RX ORDER — SODIUM CHLORIDE 0.9 % (FLUSH) 0.9 %
5-40 SYRINGE (ML) INJECTION EVERY 12 HOURS SCHEDULED
Status: DISCONTINUED | OUTPATIENT
Start: 2024-03-29 | End: 2024-04-02 | Stop reason: HOSPADM

## 2024-03-29 RX ORDER — MAGNESIUM SULFATE IN WATER 40 MG/ML
2000 INJECTION, SOLUTION INTRAVENOUS ONCE
Status: COMPLETED | OUTPATIENT
Start: 2024-03-29 | End: 2024-03-29

## 2024-03-29 RX ORDER — ONDANSETRON 4 MG/1
4 TABLET, ORALLY DISINTEGRATING ORAL EVERY 8 HOURS PRN
Status: DISCONTINUED | OUTPATIENT
Start: 2024-03-29 | End: 2024-04-02 | Stop reason: HOSPADM

## 2024-03-29 RX ORDER — NOREPINEPHRINE BITARTRATE 0.06 MG/ML
1-100 INJECTION, SOLUTION INTRAVENOUS CONTINUOUS
Status: DISCONTINUED | OUTPATIENT
Start: 2024-03-29 | End: 2024-03-30

## 2024-03-29 RX ORDER — ACETAMINOPHEN 650 MG/1
650 SUPPOSITORY RECTAL EVERY 6 HOURS PRN
Status: DISCONTINUED | OUTPATIENT
Start: 2024-03-29 | End: 2024-04-02 | Stop reason: HOSPADM

## 2024-03-29 RX ORDER — ACETAMINOPHEN 325 MG/1
650 TABLET ORAL EVERY 6 HOURS PRN
Status: DISCONTINUED | OUTPATIENT
Start: 2024-03-29 | End: 2024-04-02 | Stop reason: HOSPADM

## 2024-03-29 RX ORDER — CLOPIDOGREL BISULFATE 75 MG/1
75 TABLET ORAL DAILY
Status: DISCONTINUED | OUTPATIENT
Start: 2024-03-29 | End: 2024-04-02 | Stop reason: HOSPADM

## 2024-03-29 RX ORDER — SODIUM CHLORIDE 9 MG/ML
INJECTION, SOLUTION INTRAVENOUS PRN
Status: DISCONTINUED | OUTPATIENT
Start: 2024-03-29 | End: 2024-04-02 | Stop reason: HOSPADM

## 2024-03-29 RX ORDER — MIDODRINE HYDROCHLORIDE 5 MG/1
10 TABLET ORAL ONCE
Status: DISCONTINUED | OUTPATIENT
Start: 2024-03-29 | End: 2024-03-30

## 2024-03-29 RX ORDER — SODIUM CHLORIDE 0.9 % (FLUSH) 0.9 %
5-40 SYRINGE (ML) INJECTION PRN
Status: DISCONTINUED | OUTPATIENT
Start: 2024-03-29 | End: 2024-04-02 | Stop reason: HOSPADM

## 2024-03-29 RX ORDER — 0.9 % SODIUM CHLORIDE 0.9 %
1000 INTRAVENOUS SOLUTION INTRAVENOUS ONCE
Status: COMPLETED | OUTPATIENT
Start: 2024-03-29 | End: 2024-03-29

## 2024-03-29 RX ORDER — ATORVASTATIN CALCIUM 80 MG/1
80 TABLET, FILM COATED ORAL NIGHTLY
Status: DISCONTINUED | OUTPATIENT
Start: 2024-03-29 | End: 2024-04-02 | Stop reason: HOSPADM

## 2024-03-29 RX ORDER — ONDANSETRON 2 MG/ML
4 INJECTION INTRAMUSCULAR; INTRAVENOUS EVERY 6 HOURS PRN
Status: DISCONTINUED | OUTPATIENT
Start: 2024-03-29 | End: 2024-04-02 | Stop reason: HOSPADM

## 2024-03-29 RX ORDER — HEPARIN SODIUM 5000 [USP'U]/ML
5000 INJECTION, SOLUTION INTRAVENOUS; SUBCUTANEOUS EVERY 8 HOURS SCHEDULED
Status: DISCONTINUED | OUTPATIENT
Start: 2024-03-29 | End: 2024-04-02 | Stop reason: HOSPADM

## 2024-03-29 RX ORDER — POLYETHYLENE GLYCOL 3350 17 G/17G
17 POWDER, FOR SOLUTION ORAL DAILY PRN
Status: DISCONTINUED | OUTPATIENT
Start: 2024-03-29 | End: 2024-04-02 | Stop reason: HOSPADM

## 2024-03-29 RX ORDER — CALCIUM GLUCONATE 20 MG/ML
2000 INJECTION, SOLUTION INTRAVENOUS ONCE
Status: COMPLETED | OUTPATIENT
Start: 2024-03-29 | End: 2024-03-29

## 2024-03-29 RX ADMIN — CALCIUM GLUCONATE 2000 MG: 20 INJECTION, SOLUTION INTRAVENOUS at 14:29

## 2024-03-29 RX ADMIN — SODIUM BICARBONATE: 84 INJECTION, SOLUTION INTRAVENOUS at 12:56

## 2024-03-29 RX ADMIN — Medication 5 UNITS: at 11:58

## 2024-03-29 RX ADMIN — ACETAMINOPHEN 650 MG: 325 TABLET ORAL at 19:35

## 2024-03-29 RX ADMIN — SODIUM CHLORIDE: 9 INJECTION, SOLUTION INTRAVENOUS at 17:06

## 2024-03-29 RX ADMIN — HEPARIN SODIUM 5000 UNITS: 5000 INJECTION INTRAVENOUS; SUBCUTANEOUS at 21:35

## 2024-03-29 RX ADMIN — SODIUM CHLORIDE 1000 ML: 9 INJECTION, SOLUTION INTRAVENOUS at 10:38

## 2024-03-29 RX ADMIN — MAGNESIUM SULFATE HEPTAHYDRATE 2000 MG: 40 INJECTION, SOLUTION INTRAVENOUS at 14:45

## 2024-03-29 RX ADMIN — ATORVASTATIN CALCIUM 80 MG: 80 TABLET, FILM COATED ORAL at 19:34

## 2024-03-29 RX ADMIN — Medication 2 MCG/MIN: at 13:54

## 2024-03-29 RX ADMIN — SODIUM BICARBONATE: 84 INJECTION, SOLUTION INTRAVENOUS at 17:19

## 2024-03-29 RX ADMIN — DEXTROSE MONOHYDRATE 250 ML: 100 INJECTION, SOLUTION INTRAVENOUS at 11:54

## 2024-03-29 RX ADMIN — CLOPIDOGREL BISULFATE 75 MG: 75 TABLET ORAL at 16:08

## 2024-03-29 RX ADMIN — HEPARIN SODIUM 5000 UNITS: 5000 INJECTION INTRAVENOUS; SUBCUTANEOUS at 16:08

## 2024-03-29 RX ADMIN — SODIUM CHLORIDE, PRESERVATIVE FREE 10 ML: 5 INJECTION INTRAVENOUS at 19:36

## 2024-03-29 RX ADMIN — SODIUM BICARBONATE 50 MEQ: 84 INJECTION, SOLUTION INTRAVENOUS at 17:05

## 2024-03-29 RX ADMIN — SODIUM ZIRCONIUM CYCLOSILICATE 10 G: 5 POWDER, FOR SUSPENSION ORAL at 11:52

## 2024-03-29 ASSESSMENT — PAIN DESCRIPTION - DESCRIPTORS: DESCRIPTORS: ACHING

## 2024-03-29 ASSESSMENT — LIFESTYLE VARIABLES
HOW MANY STANDARD DRINKS CONTAINING ALCOHOL DO YOU HAVE ON A TYPICAL DAY: PATIENT DOES NOT DRINK
HOW OFTEN DO YOU HAVE A DRINK CONTAINING ALCOHOL: NEVER

## 2024-03-29 ASSESSMENT — ENCOUNTER SYMPTOMS: VOMITING: 0

## 2024-03-29 ASSESSMENT — PAIN DESCRIPTION - LOCATION: LOCATION: HEAD

## 2024-03-29 ASSESSMENT — PAIN - FUNCTIONAL ASSESSMENT: PAIN_FUNCTIONAL_ASSESSMENT: NONE - DENIES PAIN

## 2024-03-29 ASSESSMENT — PAIN SCALES - GENERAL: PAINLEVEL_OUTOF10: 3

## 2024-03-29 NOTE — ED NOTES
Pt's HR dipped to 30's. Dr. Delgado ICU at bedside. Per Dr. Delgado verbal orders Magnesium 2g over 30 minutes. Pt placed on life pack and repeat EKG obtained.

## 2024-03-29 NOTE — ED PROVIDER NOTES
[facility] ED  Emergency Department Encounter  Emergency Medicine Resident     Pt Name:Casper Cummins  MRN: [id number]  Birthdate 1950  Date of evaluation: 3/29/24  PCP:  Diane Bowie DO  Note Started: 10:16 AM EDT      CHIEF COMPLAINT       Chief Complaint   Patient presents with    Dizziness       HISTORY OF PRESENT ILLNESS  (Location/Symptom, Timing/Onset, Context/Setting, Quality, Duration, Modifying Factors, Severity.)      Casper Cummins is a 73 y.o. male who presents with dizziness.  Patient was at an appointment today with neurosurgery due to his chronic neck pain.  While at the appointment, they obtained his blood pressure and found it to be low therefore they referred him to the emergency department for further evaluation.  Patient states that he feels dizzy when he stands up.  Patient states that he has been feeling like this for some time now.  Patient is on metoprolol and lisinopril for hypertension.  Patient states he last took his hypertension medications yesterday and did not take any of them this morning.  Patient denies any recent illnesses aside from chronic diarrhea that started January of this year.  Patient denies any chest pain, shortness of breath, abdominal pain, urinary symptoms.    PAST MEDICAL / SURGICAL / SOCIAL / FAMILY HISTORY      has a past medical history of Arthritis, Coronary artery disease (CAD) excluded, Enlarged prostate, Hyperlipidemia, Hypertension, Obesity, Positive cardiac stress test, SOB (shortness of breath), and Wears partial dentures.     has a past surgical history that includes Total knee arthroplasty (Bilateral); Cataract removal; Colonoscopy (3/12/2019); Colonoscopy (3/12/2019); Coronary artery bypass graft (1993); Coronary angioplasty with stent (1544-9336); Colonoscopy (N/A, 10/20/2020); Colonoscopy (10/20/2020); eye surgery; and Cardiac catheterization (03/01/2021).    Social History     Socioeconomic History    Marital status:

## 2024-03-29 NOTE — RT PROTOCOL NOTE
RT Inhaler-Nebulizer Bronchodilator Protocol Note    There is a bronchodilator order in the chart from a provider indicating to follow the RT Bronchodilator Protocol and there is an “Initiate RT Inhaler-Nebulizer Bronchodilator Protocol” order as well (see protocol at bottom of note).    CXR Findings:  XR CHEST PORTABLE    Result Date: 3/29/2024  No acute cardiopulmonary process       The findings from the last RT Protocol Assessment were as follows:   History Pulmonary Disease: None or smoker <15 pack years  Respiratory Pattern: Regular pattern and RR 12-20 bpm  Breath Sounds: Clear breath sounds  Cough: Strong, spontaneous, non-productive  Indication for Bronchodilator Therapy:    Bronchodilator Assessment Score: 0    Aerosolized bronchodilator medication orders have been revised according to the RT Inhaler-Nebulizer Bronchodilator Protocol below.    Respiratory Therapist to perform RT Therapy Protocol Assessment initially then follow the protocol.  Repeat RT Therapy Protocol Assessment PRN for score 0-3 or on second treatment, BID, and PRN for scores above 3.    No Indications - adjust the frequency to every 6 hours PRN wheezing or bronchospasm, if no treatments needed after 48 hours then discontinue using Per Protocol order mode.     If indication present, adjust the RT bronchodilator orders based on the Bronchodilator Assessment Score as indicated below.  Use Inhaler orders unless patient has one or more of the following: on home nebulizer, not able to hold breath for 10 seconds, is not alert and oriented, cannot activate and use MDI correctly, or respiratory rate 25 breaths per minute or more, then use the equivalent nebulizer order(s) with same Frequency and PRN reasons based on the score.  If a patient is on this medication at home then do not decrease Frequency below that used at home.    0-3 - enter or revise RT bronchodilator order(s) to equivalent RT Bronchodilator order with Frequency of every 4 hours

## 2024-03-29 NOTE — H&P
Critical Care - History and Physical Examination    Patient's name:  Casper Cummins  Medical Record Number: [id number]  Patient's account/billing number: [id number]  Patient's YOB: 1950  Age: 73 y.o.  Date of Admission: 3/29/2024 10:15 AM  Reason of ICU admission:   Date of History and Physical Examination: 3/29/2024      Primary Care Physician: Diane Bowie DO  Attending Physician: Dr. FADI Dunbar    Code Status: Full Code    Chief complaint: Dizziness    Reason for ICU admission: Hyperkalemia, hypotension, metabolic acidosis.      History Of Present Illness:   History was obtained from chart review and the patient.      Casper Cummins is a 73 y.o. with past medical history of spinal canal stenosis, coronary artery disease, status post CABG 1990, multiple stents in the past, hyperlipidemia and hypertension.  Patient presented to the ED after neurosurgery appointment today due to chronic neck pain.  While at the appointment patient's blood pressure was found to be low.  Patient was then brought to the emergency department for further evaluation and stated that he had been feeling dizzy for the last couple days when he stood up.  Patient recently saw his cardiologist on 3/28 who adjusted medications.  Patient is on metoprolol and lisinopril at home for hypertension.  Patient states he last took his medications on 3/28.  Patient also has had recent diarrhea for approximately the last 2 months approximately 1-2 bouts per day of loose diarrhea stools.  Patient states that he is also had decreased appetite and oral intake.  Patient denies any other symptoms at this time.    Patient was evaluated by medical ICU team in the emergency department due to hypotension, maps were in the 70s, requiring Levophed.    Chest x-ray in the emergency department was negative  Labs in the emergency department showed hyperkalemia 6.6  Hyperchloremia 113  Metabolic acidosis bicarb of 12  BUN of 103  Creatinine  >92%  Chest x-ray negative for any acute process.  GI/Nutrition: Patient has had chronic diarrhea for approximately 2 months, patient has 1-2 bouts per day.  No immediate need for GI consultation at this time.  Ulcer Prophylaxis: Not Indicated  Diet: Renal and cardiac diet.  Last BM: Today  Renal/Fluid/Electrolyte: Patient likely had HILARIO leading to metabolic acidosis secondary to dehydration and decreased dietary intake, medication complications due to lisinopril and oral potassium outpatient.  Currently on bicarb drip  2 A of bicarb given  Repeat ABG in the evening.  pH 7.19  Bicarb 10.9, prior to 2 A of bicarb and increased bicarb drip.  Follow-up nephrology  Monitor electrolytes, replace PRN   Avoid nephrotoxic medications  ID  Afebrile  Monitor for fever and leucocytosis  Antimicrobials: None at this time  Hematology:  CBC daily  Transfuse if Hb drops below 7 or as clinically indicated.   Endocrine:   Monitor blood glucose, no history of diabetes.  Musculoskeletal/Skin:  No wounds noted  DVT Prophylaxis  EPC Cuffs  Heparin    CONSULT SERVICES: CARDIOLOGY, NEPHROLOGY , and NEUROSURGERY    DISPOSITION:  Monitor in ICU.         Adair Delgado M.D.  Internal Medicine Resident PGY-2  [facility],  [place], [place].  Attending Physician Statement  I have discussed the care of Casper Cummins, including pertinent history and exam findings,  with the resident. I have seen and examined the patient and the key elements of all parts of the encounter have been performed by me.  I agree with the assessment, plan and orders as documented by the resident with additions .    Bicarb gtt   Follow K and renal fx     Total critical care time caring for this patient with life threatening, unstable organ failure, including direct patient contact, management of life support systems, review of data including imaging and labs, discussions with other team members and physicians at least 35   Min so far today, excluding

## 2024-03-29 NOTE — CONSULTS
Nephrology Consult Note    Reason for Consult: Hyperkalemia  Requesting Physician: Dr. Dunbar    Chief Complaint: Lightheaded and dizzy  History Obtained From:  patient, electronic medical record    History of Present Illness:              This is a 73 y.o. male who has a past medical history significant for longstanding hypertension.  History of heavy smoking and according to patient he cut down drastically 6 months ago and rarely smokes a cigarette.  Patient also has a history of coronary artery disease.  He underwent coronary artery bypass surgery in 1993 and had few stent placement afterwards last stent placement was in probably 3 to 4 years ago.  Patient also has a history of obstructive sleep apnea and ischemic cardiomyopathy with EF of 45%.  Patient is under the care of cardiology.  He states from last several weeks to months he is having a low blood pressure.  His home blood pressure readings were in 80s.  He was feeling little bit lightheaded and dizzy.  He also states that he lost his appetite and in last 3 months he has lost close to 20 pounds of weight.  He was his cardiologist office yesterday for the clearance for colonoscopy.  His systolic blood pressure was 100 and the dose of lisinopril was decreased to 5 mg once a day from 10 mg once a day.  He was at medical appointment at his doctor.  His blood pressure was 80 systolic and then he was sent to ER.  Patient was also feeling lightheaded and dizzy.  In ER his initial blood pressure was 85/49.  His potassium was 6.6 and creatinine was 4.1.  Patient also has metabolic acidosis with serum bicarbonate of 12 and venous blood gases shows pH of 7.19 with pCO2 of 32 and pO2 of 126.  Nephrology was called.  Patient was started on bicarbonate drip, he also received a dose of Lokelma with insulin and glucose.  His repeat labs are pending.  There is no EKG changes with hyperkalemia.  Patient states that he is recently having chronic diarrhea.  Usually 1 or  last 72 hours.  Magnesium:   Recent Labs     03/29/24  1042   MG 2.2     Albumin:   Recent Labs     03/29/24  1042   LABALBU 4.6   Urinalysis:  U/A:   Lab Results   Component Value Date/Time    NITRU NEGATIVE 03/29/2024 12:12 PM    COLORU Yellow 03/29/2024 12:12 PM    PHUR 5.0 03/29/2024 12:12 PM    WBCUA 50  09/09/2013 06:17 PM    RBCUA 20 TO 50 09/09/2013 06:17 PM    MUCUS 2+ 09/09/2013 06:17 PM    TRICHOMONAS NOT REPORTED 09/09/2013 06:17 PM    YEAST NOT REPORTED 09/09/2013 06:17 PM    BACTERIA MANY 09/09/2013 06:17 PM    SPECGRAV 1.014 03/29/2024 12:12 PM    LEUKOCYTESUR NEGATIVE 03/29/2024 12:12 PM    UROBILINOGEN Normal 03/29/2024 12:12 PM    BILIRUBINUR NEGATIVE 03/29/2024 12:12 PM    GLUCOSEU NEGATIVE 03/29/2024 12:12 PM    KETUA TRACE 03/29/2024 12:12 PM    AMORPHOUS NOT REPORTED 09/09/2013 06:17 PM         Radiology:  Reviewed as available.    Assessment:  1.  Acute kidney injury his baseline creatinine 3 months ago was 1.1 to 1.2 mg/dL.  He presented with creatinine of 4.1.  Increase in creatinine most likely due to chronic hypotension leading to prerenal azotemia and possible ATN.  2.  Hyperkalemia due to hypoperfusion state leading to ATN further complicated by use of lisinopril and potassium supplementation.  Metabolic acidosis is also playing a role with hyperkalemia  3.  Metabolic acidosis with pH of 7.19 may be related to renal failure  4.  Hypotension etiology not clear at this point and will continue to follow.  5.  History of unexplained weight loss  6.  Anemia of chronic disease    Plan:  1. Will Check Renal Ultrasound to r/o element of obstruction and to assess the kidney size/echotexture.  2. Comprehensive urine testing including Urinalysis, Urine sodium, potassium, chloride, Urine protein and creatinine to quantify the proteinuria if any at all. Will check urinary eosinophils as well.  3. Will Order serum  protein electrophoresis to r/o element to occult paraprotein disease.  4. Will

## 2024-03-29 NOTE — CARE COORDINATION
Case Management Assessment  Initial Evaluation    Date/Time of Evaluation: 3/29/2024 2:31 PM  Assessment Completed by: Christian Mackey RN    If patient is discharged prior to next notation, then this note serves as note for discharge by case management.    Patient Name: Casper Cummins                   YOB: 1950  Diagnosis: Acute metabolic acidosis [E87.21]                   Date / Time: 3/29/2024 10:15 AM    Patient Admission Status: Inpatient   Readmission Risk (Low < 19, Mod (19-27), High > 27): No data recorded  Current PCP: Diane Bowie, DO  PCP verified by CM? (P) Yes    Chart Reviewed: Yes      History Provided by: (P) Patient  Patient Orientation: (P) Alert and Oriented    Patient Cognition: (P) Alert    Hospitalization in the last 30 days (Readmission):  No    If yes, Readmission Assessment in CM Navigator will be completed.    Advance Directives:      Code Status: Full Code   Patient's Primary Decision Maker is:      Primary Decision Maker: CumminsMaxine bae L - Spouse - [phone]    Discharge Planning:    Patient lives with: (P) Spouse/Significant Other Type of Home: (P) House  Primary Care Giver: (P) Self  Patient Support Systems include: (P) Spouse/Significant Other, Family Members   Current Financial resources:    Current community resources:    Current services prior to admission: (P) None            Current DME:              Type of Home Care services:       ADLS  Prior functional level: (P) Independent in ADLs/IADLs  Current functional level: (P) Independent in ADLs/IADLs    PT AM-PAC:   /24  OT AM-PAC:   /24    Family can provide assistance at DC: (P) Yes  Would you like Case Management to discuss the discharge plan with any other family members/significant others, and if so, who? (P) No  Plans to Return to Present Housing: (P) Yes  Other Identified Issues/Barriers to RETURNING to current housing: none identified   Potential Assistance needed at discharge: (P) N/A             Potential DME:    Patient expects to discharge to:    Plan for transportation at discharge:      Financial    Payor: [employer] MED[employer]ARE / Plan: [employer] MEDICARE-ADVANTAGE PPO / Product Type: Medicare /     Does insurance require precert for SNF: Yes    Potential assistance Purchasing Medications: (P) No  Meds-to-Beds request:        [facility] [id number] - [place] - [address] - P [phone] - F [phone]  [address] [place] [address]  Phone: [phone] Fax: [phone]      Notes:    Factors facilitating achievement of predicted outcomes: Family support    Barriers to discharge: Medical complications    Additional Case Management Notes: Patient plans to return home independently     The Plan for Transition of Care is related to the following treatment goals of Acute metabolic acidosis [E87.21]    IF APPLICABLE: The Patient and/or patient representative Casper and his family were provided with a choice of provider and agrees with the discharge plan. Freedom of choice list with basic dialogue that supports the patient's individualized plan of care/goals and shares the quality data associated with the providers was provided to:     Patient Representative Name:       The Patient and/or Patient Representative Agree with the Discharge Plan?      Christian Mackey RN  Case Management Department  Ph: [phone]

## 2024-03-29 NOTE — ED NOTES
Pt arrived to ED room 26 with c/o dizziness and low blood pressure.  Pt states he was sent over from his neurology appointment to be evaluated for the low blood pressure.  Pt has hx of hypertension but says he often gets dizzy when standing up.  Pt denies taking hypertension medication today, denies chest pain and shortness of breath.  Patient alert and oriented x4, talking in complete sentences. Respirations even and unlabored, hooked up to continuous cardiac monitor and pulse oximetry. Call light in reach, all needs met at this time.

## 2024-03-29 NOTE — ED PROVIDER NOTES
[facility]     Emergency Department     Faculty Attestation    I performed a history and physical examination of the patient and discussed management with the resident. I reviewed the resident´s note and agree with the documented findings and plan of care. Any areas of disagreement are noted on the chart. I was personally present for the key portions of any procedures. I have documented in the chart those procedures where I was not present during the key portions. I have reviewed the emergency nurses triage note. I agree with the chief complaint, past medical history, past surgical history, allergies, medications, social and family history as documented unless otherwise noted below. For Physician Assistant/ Nurse Practitioner cases/documentation I have personally evaluated this patient and have completed at least one if not all key elements of the E/M (history, physical exam, and MDM). Additional findings are as noted.    Awake alert and oriented, no focal neurodeficits, heart regular rate and rhythm, abdomen soft and nontender.  No peripheral edema.       EKG Interpretation    Interpreted by emergency department physician    Rhythm: normal sinus   Rate: normal/68  Axis: normal 53  Ectopy: PACs  Conduction: normal  ST Segments: no acute change  T Waves: no acute change  Q Waves: Inferior    Clinical Impression: Abnormal EKG    Gera Walsh, III     Gera Walsh MD  03/29/24 [id number]

## 2024-03-29 NOTE — PROGRESS NOTES
are  present.  No significant right neural foraminal narrowing.     C4-C5: There is a disc bulge and facet arthropathy.  Mild spinal canal  stenosis and severe left neural foraminal narrowing are present.  Mild right  neural foraminal narrowing is present.     C5-C6: There is a disc bulge, posterior osteophyte formation and  uncovertebral overgrowth.  There is mild spinal canal stenosis, severe left  and moderate right neural foraminal narrowing.     C6-C7: There is a focal 4 mm central disc protrusion resulting in moderate  spinal canal stenosis.  Uncovertebral overgrowth results in severe left  neural foraminal narrowing.  No significant right neural foraminal narrowing.     C7-T1: There is no disc bulge or protrusion present.  There is no significant  spinal canal stenosis or neural foraminal narrowing present.     IMPRESSION:  1. Moderate spinal canal stenosis and severe left neural foraminal narrowing  at C6-7.  2. Mild spinal canal stenosis, severe left and moderate right neural  foraminal narrowing at C5-6.  3. Mild spinal canal stenosis, severe left and mild right neural foraminal  narrowing at C4-5.  4. Mild spinal canal stenosis and mild left neural foraminal narrowing at  C3-4.    9/6/23 Xray Cervical Spine  FINDINGS:  Cervical spine alignment anatomic.  No acute osseous abnormality.  Mild-to-moderate degenerative change most significant C5-6 C6-7 with loss of  disc height endplate spondylosis.  Prevertebral soft tissues unremarkable.     IMPRESSION:  No acute findings.     Multilevel degenerative change.    PCP and Physical Therapy Notes Reviewed    Assessment and Plan:      1. Cervical spinal stenosis          Plan: Patient is having cervical radiculopathy with myelopathy. Cervical MRI shows canal stenosis. Cervical flexion/extension Xray was ordered to assess alignment and for instability. Currently the patient is having symptomatic hypotension and he is going the emergency room to be evaluated. Plan

## 2024-03-30 PROBLEM — I25.10 CORONARY ARTERY DISEASE INVOLVING NATIVE CORONARY ARTERY OF NATIVE HEART WITHOUT ANGINA PECTORIS: Status: ACTIVE | Noted: 2023-08-10

## 2024-03-30 PROBLEM — E87.5 HYPERKALEMIA: Status: ACTIVE | Noted: 2024-03-30

## 2024-03-30 PROBLEM — E87.20 METABOLIC ACIDOSIS: Status: ACTIVE | Noted: 2024-03-29

## 2024-03-30 PROBLEM — N17.9 AKI (ACUTE KIDNEY INJURY) (HCC): Status: ACTIVE | Noted: 2024-03-30

## 2024-03-30 LAB
ANION GAP SERPL CALCULATED.3IONS-SCNC: 10 MMOL/L (ref 9–16)
ANION GAP SERPL CALCULATED.3IONS-SCNC: 12 MMOL/L (ref 9–16)
ANION GAP SERPL CALCULATED.3IONS-SCNC: 13 MMOL/L (ref 9–16)
ANION GAP SERPL CALCULATED.3IONS-SCNC: 9 MMOL/L (ref 9–16)
BASOPHILS # BLD: 0.04 K/UL (ref 0–0.2)
BASOPHILS NFR BLD: 1 % (ref 0–2)
BUN SERPL-MCNC: 58 MG/DL (ref 8–23)
BUN SERPL-MCNC: 62 MG/DL (ref 8–23)
BUN SERPL-MCNC: 72 MG/DL (ref 8–23)
BUN SERPL-MCNC: 79 MG/DL (ref 8–23)
CA-I BLD-SCNC: 1.24 MMOL/L (ref 1.13–1.33)
CALCIUM SERPL-MCNC: 8.6 MG/DL (ref 8.6–10.4)
CALCIUM SERPL-MCNC: 8.7 MG/DL (ref 8.6–10.4)
CALCIUM SERPL-MCNC: 8.8 MG/DL (ref 8.6–10.4)
CALCIUM SERPL-MCNC: 8.8 MG/DL (ref 8.6–10.4)
CALPROTECTIN, FECAL: 14 UG/G
CHLORIDE SERPL-SCNC: 109 MMOL/L (ref 98–107)
CHLORIDE SERPL-SCNC: 111 MMOL/L (ref 98–107)
CO2 SERPL-SCNC: 15 MMOL/L (ref 20–31)
CO2 SERPL-SCNC: 16 MMOL/L (ref 20–31)
CO2 SERPL-SCNC: 19 MMOL/L (ref 20–31)
CO2 SERPL-SCNC: 24 MMOL/L (ref 20–31)
CREAT SERPL-MCNC: 1.6 MG/DL (ref 0.7–1.2)
CREAT SERPL-MCNC: 1.7 MG/DL (ref 0.7–1.2)
CREAT SERPL-MCNC: 1.9 MG/DL (ref 0.7–1.2)
CREAT SERPL-MCNC: 2.3 MG/DL (ref 0.7–1.2)
EOSINOPHIL # BLD: 0.2 K/UL (ref 0–0.44)
EOSINOPHILS RELATIVE PERCENT: 5 % (ref 1–4)
ERYTHROCYTE [DISTWIDTH] IN BLOOD BY AUTOMATED COUNT: 15.3 % (ref 11.8–14.4)
FREE KAPPA/LAMBDA RATIO: 2.38 (ref 0.22–1.74)
GFR SERPL CREATININE-BSD FRML MDRD: 30 ML/MIN/1.73M2
GFR SERPL CREATININE-BSD FRML MDRD: 36 ML/MIN/1.73M2
GFR SERPL CREATININE-BSD FRML MDRD: 43 ML/MIN/1.73M2
GFR SERPL CREATININE-BSD FRML MDRD: 47 ML/MIN/1.73M2
GLUCOSE SERPL-MCNC: 114 MG/DL (ref 74–99)
GLUCOSE SERPL-MCNC: 120 MG/DL (ref 74–99)
GLUCOSE SERPL-MCNC: 133 MG/DL (ref 74–99)
GLUCOSE SERPL-MCNC: 136 MG/DL (ref 74–99)
HCT VFR BLD AUTO: 24.7 % (ref 40.7–50.3)
HGB BLD-MCNC: 8 G/DL (ref 13–17)
IMM GRANULOCYTES # BLD AUTO: <0.03 K/UL (ref 0–0.3)
IMM GRANULOCYTES NFR BLD: 1 %
INR PPP: 1.1
IRON SATN MFR SERPL: 28 % (ref 20–55)
IRON SERPL-MCNC: 65 UG/DL (ref 61–157)
KAPPA LC FREE SER-MCNC: 92.7 MG/L
LACTIC ACID, WHOLE BLOOD: 0.6 MMOL/L (ref 0.7–2.1)
LAMBDA LC FREE SERPL-MCNC: 38.9 MG/L (ref 4.2–27.7)
LYMPHOCYTES NFR BLD: 1.08 K/UL (ref 1.1–3.7)
LYMPHOCYTES RELATIVE PERCENT: 25 % (ref 24–43)
MAGNESIUM SERPL-MCNC: 2.4 MG/DL (ref 1.6–2.4)
MCH RBC QN AUTO: 33.8 PG (ref 25.2–33.5)
MCHC RBC AUTO-ENTMCNC: 32.4 G/DL (ref 28.4–34.8)
MCV RBC AUTO: 104.2 FL (ref 82.6–102.9)
MONOCYTES NFR BLD: 0.39 K/UL (ref 0.1–1.2)
MONOCYTES NFR BLD: 9 % (ref 3–12)
MRSA, DNA, NASAL: NEGATIVE
NEUTROPHILS NFR BLD: 59 % (ref 36–65)
NEUTS SEG NFR BLD: 2.61 K/UL (ref 1.5–8.1)
NRBC BLD-RTO: 0 PER 100 WBC
PLATELET # BLD AUTO: 94 K/UL (ref 138–453)
PMV BLD AUTO: 9.2 FL (ref 8.1–13.5)
POTASSIUM SERPL-SCNC: 4.5 MMOL/L (ref 3.7–5.3)
POTASSIUM SERPL-SCNC: 4.7 MMOL/L (ref 3.7–5.3)
POTASSIUM SERPL-SCNC: 4.8 MMOL/L (ref 3.7–5.3)
POTASSIUM SERPL-SCNC: 4.8 MMOL/L (ref 3.7–5.3)
PROTHROMBIN TIME: 14.4 SEC (ref 11.7–14.9)
RBC # BLD AUTO: 2.37 M/UL (ref 4.21–5.77)
RBC # BLD: ABNORMAL 10*6/UL
RBC # BLD: ABNORMAL 10*6/UL
SODIUM SERPL-SCNC: 138 MMOL/L (ref 136–145)
SODIUM SERPL-SCNC: 140 MMOL/L (ref 136–145)
SODIUM SERPL-SCNC: 140 MMOL/L (ref 136–145)
SODIUM SERPL-SCNC: 142 MMOL/L (ref 136–145)
SPECIMEN DESCRIPTION: NORMAL
TIBC SERPL-MCNC: 235 UG/DL (ref 250–450)
UNSATURATED IRON BINDING CAPACITY: 170 UG/DL (ref 112–347)
WBC OTHER # BLD: 4.3 K/UL (ref 3.5–11.3)

## 2024-03-30 PROCEDURE — 99291 CRITICAL CARE FIRST HOUR: CPT | Performed by: INTERNAL MEDICINE

## 2024-03-30 PROCEDURE — 82272 OCCULT BLD FECES 1-3 TESTS: CPT

## 2024-03-30 PROCEDURE — 85610 PROTHROMBIN TIME: CPT

## 2024-03-30 PROCEDURE — 1200000000 HC SEMI PRIVATE

## 2024-03-30 PROCEDURE — 2500000003 HC RX 250 WO HCPCS

## 2024-03-30 PROCEDURE — 93005 ELECTROCARDIOGRAM TRACING: CPT | Performed by: INTERNAL MEDICINE

## 2024-03-30 PROCEDURE — 99223 1ST HOSP IP/OBS HIGH 75: CPT | Performed by: INTERNAL MEDICINE

## 2024-03-30 PROCEDURE — 36415 COLL VENOUS BLD VENIPUNCTURE: CPT

## 2024-03-30 PROCEDURE — 99232 SBSQ HOSP IP/OBS MODERATE 35: CPT | Performed by: INTERNAL MEDICINE

## 2024-03-30 PROCEDURE — 85025 COMPLETE CBC W/AUTO DIFF WBC: CPT

## 2024-03-30 PROCEDURE — 2580000003 HC RX 258

## 2024-03-30 PROCEDURE — 6370000000 HC RX 637 (ALT 250 FOR IP)

## 2024-03-30 PROCEDURE — 83735 ASSAY OF MAGNESIUM: CPT

## 2024-03-30 PROCEDURE — 83550 IRON BINDING TEST: CPT

## 2024-03-30 PROCEDURE — 94761 N-INVAS EAR/PLS OXIMETRY MLT: CPT

## 2024-03-30 PROCEDURE — 82330 ASSAY OF CALCIUM: CPT

## 2024-03-30 PROCEDURE — 93005 ELECTROCARDIOGRAM TRACING: CPT | Performed by: EMERGENCY MEDICINE

## 2024-03-30 PROCEDURE — 6370000000 HC RX 637 (ALT 250 FOR IP): Performed by: STUDENT IN AN ORGANIZED HEALTH CARE EDUCATION/TRAINING PROGRAM

## 2024-03-30 PROCEDURE — 6360000002 HC RX W HCPCS

## 2024-03-30 PROCEDURE — 80048 BASIC METABOLIC PNL TOTAL CA: CPT

## 2024-03-30 PROCEDURE — 83540 ASSAY OF IRON: CPT

## 2024-03-30 PROCEDURE — 83605 ASSAY OF LACTIC ACID: CPT

## 2024-03-30 RX ORDER — MIDODRINE HYDROCHLORIDE 5 MG/1
10 TABLET ORAL
Status: DISCONTINUED | OUTPATIENT
Start: 2024-03-30 | End: 2024-04-02

## 2024-03-30 RX ADMIN — SODIUM CHLORIDE: 9 INJECTION, SOLUTION INTRAVENOUS at 05:35

## 2024-03-30 RX ADMIN — MIDODRINE HYDROCHLORIDE 10 MG: 5 TABLET ORAL at 07:51

## 2024-03-30 RX ADMIN — SODIUM CHLORIDE, PRESERVATIVE FREE 10 ML: 5 INJECTION INTRAVENOUS at 19:56

## 2024-03-30 RX ADMIN — MIDODRINE HYDROCHLORIDE 10 MG: 5 TABLET ORAL at 11:40

## 2024-03-30 RX ADMIN — SODIUM CHLORIDE, PRESERVATIVE FREE 10 ML: 5 INJECTION INTRAVENOUS at 07:51

## 2024-03-30 RX ADMIN — HEPARIN SODIUM 5000 UNITS: 5000 INJECTION INTRAVENOUS; SUBCUTANEOUS at 05:40

## 2024-03-30 RX ADMIN — SODIUM BICARBONATE: 84 INJECTION, SOLUTION INTRAVENOUS at 14:16

## 2024-03-30 RX ADMIN — HEPARIN SODIUM 5000 UNITS: 5000 INJECTION INTRAVENOUS; SUBCUTANEOUS at 21:55

## 2024-03-30 RX ADMIN — ATORVASTATIN CALCIUM 80 MG: 80 TABLET, FILM COATED ORAL at 19:56

## 2024-03-30 RX ADMIN — CLOPIDOGREL BISULFATE 75 MG: 75 TABLET ORAL at 07:51

## 2024-03-30 RX ADMIN — HEPARIN SODIUM 5000 UNITS: 5000 INJECTION INTRAVENOUS; SUBCUTANEOUS at 14:09

## 2024-03-30 RX ADMIN — MIDODRINE HYDROCHLORIDE 10 MG: 5 TABLET ORAL at 17:17

## 2024-03-30 RX ADMIN — SODIUM BICARBONATE: 84 INJECTION, SOLUTION INTRAVENOUS at 04:49

## 2024-03-30 NOTE — PLAN OF CARE
Brief Neurosurgery Note    Patient was seen and evaluated in office today by nurse practitioner. Plan in place for patient patient to follow up with surgeon in 2 weeks. Plan will not change inpatient especially in the setting of patient admission in MICU for hyperkalemia, hypotension and metabolic acidosis.   Patient to follow up once discharged     Fátima Simmons, ORAL-CNP

## 2024-03-30 NOTE — PROGRESS NOTES
Assessment: Patient was reclining in his chair when  visited. Family was present and receptive to spiritual care. Patient remained hopeful and seemed to be doing well. When asked how he was feeling, patient responded, \"better.\" Patient said he was raised Gnosticism but not affiliated with any [place]. Patient received sacrament of anointing of the sick.   Intervention:  maintained listening presence, offered support and prayed with patient and family.   Outcome: Patient and family expressed gratitude for the anointing and blessing they received.   Plan: Chaplains would continue to remain available for more spiritual and emotional support as needed.

## 2024-03-30 NOTE — PROGRESS NOTES
Renal Progress Note    Patient :  Casper Cummins; 73 y.o. MRN# [id number]  Location:  3005/3005-01  Attending:  Alexandre Dunbar MD  Admit Date:  3/29/2024   Hospital Day: 1    Subjective:     Patient was seen and examined.  No new issues reported overnight.  Yesterday patient was transferred to the ICU due to persistent hypotension and required initiation of pressor support.  BMP results from today reviewed sodium 140, potassium 4.8, chloride 111, bicarb 16, calcium 8.8, BUN 72, creatinine did improve to 3.9 mg/dl.  Peak creatinine this admission was 4.1 mg/dl.  Patient also had hyperkalemia with potassium of 6.6 initially which has not improved with 4.8.  Urine output documented as about 1.1 L in the last 24 hours.    Patient is currently on pressor support x 1.  On midodrine 10 mg 3 times a day.  Getting IV fluids IV fluids sodium bicarb 150 mEq mEq in D5W at 100 cc an hour.    Renal ultrasound 3/29/2024: Right kidney measures 11.09 cm and left kidney measures 11.1 cm.   Impression: Unremarkable ultrasound of the kidneys and urinary bladder.  Outpatient Medications:     Medications Prior to Admission: lisinopril (PRINIVIL;ZESTRIL) 5 MG tablet, Take 1 tablet by mouth daily  clopidogrel (PLAVIX) 75 MG tablet, Take 1 tablet by mouth daily  bumetanide (BUMEX) 2 MG tablet, Take 1 tablet by mouth daily PT NEEDS TO CALL FOR AN APPOINTMENT FOR FURTHER REFILLS  metoprolol succinate (TOPROL XL) 25 MG extended release tablet, Take 1 tablet by mouth 2 times daily (Patient taking differently: Take 1 tablet by mouth daily)  simvastatin (ZOCOR) 80 MG tablet, Take 1 tablet by mouth nightly  potassium chloride (KLOR-CON M) 20 MEQ extended release tablet, Take 2 tablets by mouth daily for 7 days  isosorbide mononitrate (IMDUR) 30 MG CR tablet, Take 1 tablet by mouth daily  tamsulosin (FLOMAX) 0.4 MG capsule, Take 1 capsule by mouth daily    Current Medications:     Scheduled Meds:    midodrine  10 mg Oral TID WC    clopidogrel  75  admission was 4.1 mg/dl.  Baseline creatinine seems to be around 1.1-1.2 mg/dl, now creatinine improving.  Hyperkalemia likely due to decreased distal sodium delivery, underlying renal dysfunction, metabolic acidosis and complicated by lisinopril and potassium supplementation at home.  Hypotension requiring pressor support.  Metabolic acidosis.  Anemia of chronic disease.  Coronary artery disease with history of CABG.  History of cervical stenosis.    Plan:   Continue bicarb drip for now.  Monitor strict I's and O's and renal function.  Agree with stopping lisinopril.  Wean off pressor support as tolerated.  Continue Midodrine.  BMP in AM.  Will follow.    Nutrition   Please ensure that patient is on a renal diet/TF. Avoid nephrotoxic drugs/contrast exposure.    Aftab Laughlin MD  [facility]     This note is created with the assistance of a speech-recognition program. While intending to generate a document that actually reflects the content of the visit, no guarantees can be provided that every mistake has been identified and corrected by editing.

## 2024-03-30 NOTE — PLAN OF CARE
Problem: Discharge Planning  Goal: Discharge to home or other facility with appropriate resources  3/30/2024 1715 by Jame Fuentes, RN  Outcome: Progressing     Problem: Safety - Adult  Goal: Free from fall injury  3/30/2024 1715 by Jame Fuentes, RN  Outcome: Progressing     Problem: ABCDS Injury Assessment  Goal: Absence of physical injury  3/30/2024 1715 by Jame Fuentes, RN  Outcome: Progressing

## 2024-03-30 NOTE — PLAN OF CARE
Problem: Discharge Planning  Goal: Discharge to home or other facility with appropriate resources  3/30/2024 1936 by Nancy Marlow RN  Outcome: Progressing  3/30/2024 1715 by Jame Fuentes RN  Outcome: Progressing  3/30/2024 0621 by Nancy Marlow RN  Outcome: Progressing  Flowsheets (Taken 3/29/2024 2000)  Discharge to home or other facility with appropriate resources: Identify barriers to discharge with patient and caregiver     Problem: Safety - Adult  Goal: Free from fall injury  3/30/2024 1936 by Nancy Marlow RN  Outcome: Progressing  3/30/2024 1715 by Jame Fuentes RN  Outcome: Progressing  3/30/2024 0621 by Nancy Marlow RN  Outcome: Progressing  Flowsheets (Taken 3/29/2024 1948)  Free From Fall Injury: Instruct family/caregiver on patient safety     Problem: ABCDS Injury Assessment  Goal: Absence of physical injury  3/30/2024 1936 by Nancy Marlow RN  Outcome: Progressing  3/30/2024 1715 by Jame Fuentes RN  Outcome: Progressing  3/30/2024 0621 by Nancy Marlow RN  Outcome: Progressing

## 2024-03-30 NOTE — PLAN OF CARE
Problem: Discharge Planning  Goal: Discharge to home or other facility with appropriate resources  3/30/2024 0621 by Nancy Marlow RN  Outcome: Progressing  Flowsheets (Taken 3/29/2024 2000)  Discharge to home or other facility with appropriate resources: Identify barriers to discharge with patient and caregiver  3/29/2024 1802 by Luz Eli RN  Outcome: Progressing     Problem: Safety - Adult  Goal: Free from fall injury  3/30/2024 0621 by Nancy Marlow RN  Outcome: Progressing  Flowsheets (Taken 3/29/2024 1948)  Free From Fall Injury: Instruct family/caregiver on patient safety  3/29/2024 1802 by Luz Eli RN  Outcome: Progressing     Problem: ABCDS Injury Assessment  Goal: Absence of physical injury  Outcome: Progressing

## 2024-03-30 NOTE — CONSULTS
[facility]   Consult Note                 Date:   3/30/2024  Patient name: Casper Cummins  Date of admission:  3/29/2024 10:15 AM  MRN:   [id number]  YOB: 1950    Reason for Consult: Cardiac evaluation    CHIEF COMPLAINT: Dizziness    History Obtained From:  Patient     HISTORY OF PRESENT ILLNESS:    The patient is a 73 y.o. male presented to hospital due to dizziness.  He was at his neurosurgery's office prior to admission , ed due to persistent dizziness, NAD was noted to be hypotensive.  MICU consulted and transferred as the patient was on Levophed.  Patient was seen by Dr. Saravia in the office on 3/28/2024 and did complain of dizziness at the time with lisinopril being lowered to 2.5 mg.  Troponin 50 on admission and has been downtrending.  Denies any chest pain.  He did report having diarrhea for the past 2 months.  His potassium was 6.6 on admission, creatinine 4.1, .  Patient admitted to MICU and cardiac consulted for further evaluation      Past Medical History:   has a past medical history of Arthritis, Coronary artery disease (CAD) excluded, Enlarged prostate, Hyperlipidemia, Hypertension, Obesity, Positive cardiac stress test, SOB (shortness of breath), and Wears partial dentures.    Past Surgical History:   has a past surgical history that includes Total knee arthroplasty (Bilateral); Cataract removal; Colonoscopy (3/12/2019); Colonoscopy (3/12/2019); Coronary artery bypass graft (1993); Coronary angioplasty with stent (0180-8349); Colonoscopy (N/A, 10/20/2020); Colonoscopy (10/20/2020); eye surgery; and Cardiac catheterization (03/01/2021).     Home Medications:    Prior to Admission medications    Medication Sig Start Date End Date Taking? Authorizing Provider   lisinopril (PRINIVIL;ZESTRIL) 5 MG tablet Take 1 tablet by mouth daily 3/28/24   Markell Saravia DO   clopidogrel (PLAVIX) 75 MG tablet Take 1 tablet by mouth daily 1/3/24   Markell Saravia DO   bumetanide (BUMEX)

## 2024-03-30 NOTE — PLAN OF CARE
Critical care team - Resident sign-out to medicine service      Date and time: 3/31/2024 9:01 AM  Patient's name:  Casper Cummins  Medical Record Number: [id number]  Patient's account/billing number: [id number]  Patient's YOB: 1950  Age: 73 y.o.  Date of Admission: 3/29/2024 10:15 AM  Length of stay during current admission: 2    Primary Care Physician: Diane Bowie DO    Code Status: Full Code    Mode of physician to physician communication:        [x] Via telephone   [] In person     Date and time of sign-out: 3/31/2024 9:01 AM    Accepting Medicine team: Internal medicine    Accepting team's attending: Dr. Hoyos    Patient's current ICU Bed:  3018     Patient's assigned bed on floor:  338        [x] Med-Surg Monitored [] Step-down       [] Psychiatry ICU       [] Psych floor     Reason for ICU admission:     Hypotension, dizziness, hyperkalemia    ICU course summary:     73 y.o. with past medical history of spinal canal stenosis, coronary artery disease, status post CABG 1990, multiple stents in the past, hyperlipidemia and hypertension.  Patient presented to the ED after neurosurgery appointment today due to chronic neck pain.  While at the appointment patient's blood pressure was found to be low.  Patient was then brought to the emergency department for further evaluation and stated that he had been feeling dizzy for the last couple days when he stood up.  Patient recently saw his cardiologist on 3/28 who adjusted medications.  Patient is on metoprolol and lisinopril at home for hypertension.  Patient states he last took his medications on 3/28.  Patient also has had recent diarrhea for approximately the last 2 months approximately 1-2 bouts per day of loose diarrhea stools.  Patient states that he is also had decreased appetite and oral intake.  Patient denies any other symptoms at this time.     Patient was evaluated by medical ICU team in the emergency department due to

## 2024-03-31 PROBLEM — I50.30 DIASTOLIC HEART FAILURE (HCC): Status: ACTIVE | Noted: 2024-03-31

## 2024-03-31 PROBLEM — K52.9 CHRONIC DIARRHEA: Status: ACTIVE | Noted: 2024-03-31

## 2024-03-31 PROBLEM — D64.9 ANEMIA: Status: ACTIVE | Noted: 2024-03-31

## 2024-03-31 LAB
ANION GAP SERPL CALCULATED.3IONS-SCNC: 11 MMOL/L (ref 9–16)
ANION GAP SERPL CALCULATED.3IONS-SCNC: 11 MMOL/L (ref 9–16)
ANION GAP SERPL CALCULATED.3IONS-SCNC: 15 MMOL/L (ref 9–16)
ANION GAP SERPL CALCULATED.3IONS-SCNC: 9 MMOL/L (ref 9–16)
BASOPHILS # BLD: 0.03 K/UL (ref 0–0.2)
BASOPHILS NFR BLD: 1 % (ref 0–2)
BUN SERPL-MCNC: 42 MG/DL (ref 8–23)
BUN SERPL-MCNC: 47 MG/DL (ref 8–23)
BUN SERPL-MCNC: 48 MG/DL (ref 8–23)
BUN SERPL-MCNC: 52 MG/DL (ref 8–23)
CA-I BLD-SCNC: 1.13 MMOL/L (ref 1.13–1.33)
CALCIUM SERPL-MCNC: 8.5 MG/DL (ref 8.6–10.4)
CALCIUM SERPL-MCNC: 8.6 MG/DL (ref 8.6–10.4)
CALCIUM SERPL-MCNC: 8.7 MG/DL (ref 8.6–10.4)
CALCIUM SERPL-MCNC: 9 MG/DL (ref 8.6–10.4)
CHLORIDE SERPL-SCNC: 106 MMOL/L (ref 98–107)
CHLORIDE SERPL-SCNC: 107 MMOL/L (ref 98–107)
CHLORIDE SERPL-SCNC: 107 MMOL/L (ref 98–107)
CHLORIDE SERPL-SCNC: 108 MMOL/L (ref 98–107)
CO2 SERPL-SCNC: 21 MMOL/L (ref 20–31)
CO2 SERPL-SCNC: 24 MMOL/L (ref 20–31)
CO2 SERPL-SCNC: 24 MMOL/L (ref 20–31)
CO2 SERPL-SCNC: 27 MMOL/L (ref 20–31)
CREAT SERPL-MCNC: 1.4 MG/DL (ref 0.7–1.2)
CREAT SERPL-MCNC: 1.5 MG/DL (ref 0.7–1.2)
DATE, STOOL #1: NORMAL
EKG ATRIAL RATE: 312 BPM
EKG ATRIAL RATE: 63 BPM
EKG ATRIAL RATE: 68 BPM
EKG P AXIS: 38 DEGREES
EKG P AXIS: 55 DEGREES
EKG P-R INTERVAL: 194 MS
EKG P-R INTERVAL: 214 MS
EKG Q-T INTERVAL: 382 MS
EKG Q-T INTERVAL: 412 MS
EKG Q-T INTERVAL: 432 MS
EKG QRS DURATION: 84 MS
EKG QRS DURATION: 88 MS
EKG QRS DURATION: 88 MS
EKG QTC CALCULATION (BAZETT): 406 MS
EKG QTC CALCULATION (BAZETT): 421 MS
EKG QTC CALCULATION (BAZETT): 528 MS
EKG R AXIS: 19 DEGREES
EKG R AXIS: 38 DEGREES
EKG R AXIS: 53 DEGREES
EKG T AXIS: 31 DEGREES
EKG T AXIS: 71 DEGREES
EKG T AXIS: 97 DEGREES
EKG VENTRICULAR RATE: 63 BPM
EKG VENTRICULAR RATE: 68 BPM
EKG VENTRICULAR RATE: 90 BPM
EOSINOPHIL # BLD: 0.2 K/UL (ref 0–0.44)
EOSINOPHILS RELATIVE PERCENT: 4 % (ref 1–4)
ERYTHROCYTE [DISTWIDTH] IN BLOOD BY AUTOMATED COUNT: 15.1 % (ref 11.8–14.4)
FECAL PANCREATIC ELASTASE-1: 452 UG/G
FERRITIN SERPL-MCNC: 667 NG/ML (ref 30–400)
FOLATE SERPL-MCNC: 9.8 NG/ML (ref 4.8–24.2)
GFR SERPL CREATININE-BSD FRML MDRD: 48 ML/MIN/1.73M2
GFR SERPL CREATININE-BSD FRML MDRD: 50 ML/MIN/1.73M2
GFR SERPL CREATININE-BSD FRML MDRD: 51 ML/MIN/1.73M2
GFR SERPL CREATININE-BSD FRML MDRD: 53 ML/MIN/1.73M2
GLUCOSE SERPL-MCNC: 101 MG/DL (ref 74–99)
GLUCOSE SERPL-MCNC: 114 MG/DL (ref 74–99)
GLUCOSE SERPL-MCNC: 117 MG/DL (ref 74–99)
GLUCOSE SERPL-MCNC: 117 MG/DL (ref 74–99)
HAPTOGLOB SERPL-MCNC: 174 MG/DL (ref 30–200)
HCT VFR BLD AUTO: 23.7 % (ref 40.7–50.3)
HCT VFR BLD AUTO: 26.4 % (ref 40.7–50.3)
HEMOCCULT SP1 STL QL: NEGATIVE
HGB BLD-MCNC: 7.7 G/DL (ref 13–17)
HGB BLD-MCNC: 8.8 G/DL (ref 13–17)
IMM GRANULOCYTES # BLD AUTO: <0.03 K/UL (ref 0–0.3)
IMM GRANULOCYTES NFR BLD: 0 %
IMM RETICS NFR: 11.1 % (ref 2.7–18.3)
LDH SERPL-CCNC: 169 U/L (ref 135–225)
LYMPHOCYTES NFR BLD: 1.28 K/UL (ref 1.1–3.7)
LYMPHOCYTES RELATIVE PERCENT: 28 % (ref 24–43)
MAGNESIUM SERPL-MCNC: 2.1 MG/DL (ref 1.6–2.4)
MCH RBC QN AUTO: 33.8 PG (ref 25.2–33.5)
MCHC RBC AUTO-ENTMCNC: 32.5 G/DL (ref 28.4–34.8)
MCV RBC AUTO: 103.9 FL (ref 82.6–102.9)
MONOCYTES NFR BLD: 0.37 K/UL (ref 0.1–1.2)
MONOCYTES NFR BLD: 8 % (ref 3–12)
NEUTROPHILS NFR BLD: 59 % (ref 36–65)
NEUTS SEG NFR BLD: 2.66 K/UL (ref 1.5–8.1)
NRBC BLD-RTO: 0 PER 100 WBC
PLATELET # BLD AUTO: 83 K/UL (ref 138–453)
PMV BLD AUTO: 9.2 FL (ref 8.1–13.5)
POTASSIUM SERPL-SCNC: 4.2 MMOL/L (ref 3.7–5.3)
POTASSIUM SERPL-SCNC: 4.3 MMOL/L (ref 3.7–5.3)
POTASSIUM SERPL-SCNC: 4.4 MMOL/L (ref 3.7–5.3)
POTASSIUM SERPL-SCNC: 4.6 MMOL/L (ref 3.7–5.3)
RBC # BLD AUTO: 2.28 M/UL (ref 4.21–5.77)
RBC # BLD: ABNORMAL 10*6/UL
RBC # BLD: ABNORMAL 10*6/UL
RETIC HEMOGLOBIN: 35.3 PG (ref 28.2–35.7)
RETICS # AUTO: 0.09 M/UL (ref 0.03–0.08)
RETICS/RBC NFR AUTO: 4 % (ref 0.5–1.9)
SODIUM SERPL-SCNC: 142 MMOL/L (ref 136–145)
SODIUM SERPL-SCNC: 142 MMOL/L (ref 136–145)
SODIUM SERPL-SCNC: 143 MMOL/L (ref 136–145)
SODIUM SERPL-SCNC: 143 MMOL/L (ref 136–145)
TIME, STOOL #1: NORMAL
TSH SERPL DL<=0.05 MIU/L-ACNC: 2.83 UIU/ML (ref 0.27–4.2)
VIT B12 SERPL-MCNC: 521 PG/ML (ref 232–1245)
WBC OTHER # BLD: 4.6 K/UL (ref 3.5–11.3)

## 2024-03-31 PROCEDURE — 82728 ASSAY OF FERRITIN: CPT

## 2024-03-31 PROCEDURE — 6360000002 HC RX W HCPCS

## 2024-03-31 PROCEDURE — 1200000000 HC SEMI PRIVATE

## 2024-03-31 PROCEDURE — 6370000000 HC RX 637 (ALT 250 FOR IP)

## 2024-03-31 PROCEDURE — 82746 ASSAY OF FOLIC ACID SERUM: CPT

## 2024-03-31 PROCEDURE — 85045 AUTOMATED RETICULOCYTE COUNT: CPT

## 2024-03-31 PROCEDURE — 85025 COMPLETE CBC W/AUTO DIFF WBC: CPT

## 2024-03-31 PROCEDURE — 99233 SBSQ HOSP IP/OBS HIGH 50: CPT | Performed by: INTERNAL MEDICINE

## 2024-03-31 PROCEDURE — 2580000003 HC RX 258

## 2024-03-31 PROCEDURE — 99232 SBSQ HOSP IP/OBS MODERATE 35: CPT | Performed by: INTERNAL MEDICINE

## 2024-03-31 PROCEDURE — 82607 VITAMIN B-12: CPT

## 2024-03-31 PROCEDURE — 85014 HEMATOCRIT: CPT

## 2024-03-31 PROCEDURE — 85018 HEMOGLOBIN: CPT

## 2024-03-31 PROCEDURE — 36415 COLL VENOUS BLD VENIPUNCTURE: CPT

## 2024-03-31 PROCEDURE — 82330 ASSAY OF CALCIUM: CPT

## 2024-03-31 PROCEDURE — 6370000000 HC RX 637 (ALT 250 FOR IP): Performed by: STUDENT IN AN ORGANIZED HEALTH CARE EDUCATION/TRAINING PROGRAM

## 2024-03-31 PROCEDURE — 84443 ASSAY THYROID STIM HORMONE: CPT

## 2024-03-31 PROCEDURE — 2500000003 HC RX 250 WO HCPCS

## 2024-03-31 PROCEDURE — 83615 LACTATE (LD) (LDH) ENZYME: CPT

## 2024-03-31 PROCEDURE — 80048 BASIC METABOLIC PNL TOTAL CA: CPT

## 2024-03-31 PROCEDURE — 83735 ASSAY OF MAGNESIUM: CPT

## 2024-03-31 PROCEDURE — 83010 ASSAY OF HAPTOGLOBIN QUANT: CPT

## 2024-03-31 RX ADMIN — SODIUM CHLORIDE, PRESERVATIVE FREE 10 ML: 5 INJECTION INTRAVENOUS at 07:44

## 2024-03-31 RX ADMIN — ACETAMINOPHEN 650 MG: 325 TABLET ORAL at 20:45

## 2024-03-31 RX ADMIN — SODIUM CHLORIDE, PRESERVATIVE FREE 10 ML: 5 INJECTION INTRAVENOUS at 19:32

## 2024-03-31 RX ADMIN — HEPARIN SODIUM 5000 UNITS: 5000 INJECTION INTRAVENOUS; SUBCUTANEOUS at 06:26

## 2024-03-31 RX ADMIN — ATORVASTATIN CALCIUM 80 MG: 80 TABLET, FILM COATED ORAL at 20:45

## 2024-03-31 RX ADMIN — HEPARIN SODIUM 5000 UNITS: 5000 INJECTION INTRAVENOUS; SUBCUTANEOUS at 20:45

## 2024-03-31 RX ADMIN — MIDODRINE HYDROCHLORIDE 10 MG: 5 TABLET ORAL at 12:32

## 2024-03-31 RX ADMIN — HEPARIN SODIUM 5000 UNITS: 5000 INJECTION INTRAVENOUS; SUBCUTANEOUS at 14:15

## 2024-03-31 RX ADMIN — SODIUM BICARBONATE: 84 INJECTION, SOLUTION INTRAVENOUS at 02:05

## 2024-03-31 RX ADMIN — MIDODRINE HYDROCHLORIDE 10 MG: 5 TABLET ORAL at 07:43

## 2024-03-31 RX ADMIN — CLOPIDOGREL BISULFATE 75 MG: 75 TABLET ORAL at 07:43

## 2024-03-31 ASSESSMENT — PAIN SCALES - GENERAL
PAINLEVEL_OUTOF10: 0
PAINLEVEL_OUTOF10: 4

## 2024-03-31 ASSESSMENT — PAIN DESCRIPTION - ORIENTATION: ORIENTATION: LEFT

## 2024-03-31 ASSESSMENT — PAIN DESCRIPTION - LOCATION: LOCATION: FOOT

## 2024-03-31 ASSESSMENT — PAIN DESCRIPTION - DESCRIPTORS: DESCRIPTORS: SORE;ACHING

## 2024-03-31 NOTE — PLAN OF CARE
Spoke with Dr. De La Cruz. Physician requested we obtain fecal occult blood and monitor pt in the ICU for one more night. If FOBT negative, will request transfer and further GI work up to be done on the floor.

## 2024-03-31 NOTE — PLAN OF CARE
Problem: Discharge Planning  Goal: Discharge to home or other facility with appropriate resources  3/31/2024 0923 by Jame Fuentes RN  Outcome: Progressing  Flowsheets (Taken 3/30/2024 2000 by Nancy Marlow, RN)  Discharge to home or other facility with appropriate resources: Identify barriers to discharge with patient and caregiver     Problem: Safety - Adult  Goal: Free from fall injury  3/31/2024 0923 by Jame Fuentes RN  Outcome: Progressing  Flowsheets (Taken 3/30/2024 2000 by Nancy Marlow, RN)  Free From Fall Injury: Instruct family/caregiver on patient safety     Problem: ABCDS Injury Assessment  Goal: Absence of physical injury  3/31/2024 0923 by Jame Fuentes RN  Outcome: Progressing  Flowsheets (Taken 3/30/2024 2000 by Nancy Marlow, RN)  Absence of Physical Injury: Implement safety measures based on patient assessment

## 2024-03-31 NOTE — PROGRESS NOTES
Attending Physician Statement  I have discussed the case of Casper Cummins, including pertinent history and exam findings with the resident/fellow/medical student/NP/PA. I have seen and examined the patient and the key elements of the encounter have been performed by me.  I agree with the assessment, plan and orders as documented by the resident/fellow/medical student/NP/PA  With changes made to the note as needed.     Pt was seen during rounds.  Review of Systems:   In addition to the pertinent positives and negatives as stated within HPI and the review of systems as documented in their notes, all other systems were reviewed when able to and are reported negative.  Patient transferred out of the ICU  Blood pressure has been stable  Has bradycardia, asymptomatic  Cardiology has been following the patient  Patient had 60 pound weight loss over the past 1 year  Needs outpatient colonoscopy  Discharge planning in progress        Zia Hoyos MD  3/31/2024  6:37 PM

## 2024-03-31 NOTE — H&P
[facility]     Department of Internal Medicine - Staff Internal Medicine Service   ICU PATIENT TRANSFER NOTE        Patient:  Casper Cummins  YOB: 1950  MRN: [id number]     Acct: [id number]     Admit date: 3/29/2024    Code Status:-  Full code     Reason for ICU Admission:-       SUPPORT DEVICES: [] Ventilator [] BIPAP  [] Nasal Cannula [x] Room Air    Consultations:- [x] Cardiology [] Nephrology  [] Hemo onco  [] GI                               [] ID [] ENT  [] Rheum [] Endo   []Physiotherapy                                 Others:-     NUTRITION:  [] NPO [] Tube Feeding (Specify: ) [] TPN  [x] PO    Central Lines:- [x] No   [] Yes           If yes - Days/Date of Insertion.      Pt seen,examined and Chart reviewed.    ICU COURSE:    Patient is a 73-year-old male with history of spinal canal stenosis, CAD status post CABG in 1993 and multiple stent,HLD, hypertension and RALPH presented with dizziness.    On 3/29, patient had a neurosurgery appointment for chronic neck pain.  There he was found to be hypotensive. He follows with [facility] outpatient and had an appointment with Dr. Saravia on 3/28.  At home, he is on Toprol 25 twice daily, lisinopril 5 OD and Bumex 2 OD. Dr. Saravia decreased Lisinopril from 5 mg OD to 2.5 mg OD.    He was brought to the ED. He was hypotensive and had HILARIO with Cr 4.4, NAGMA and potassium of 6.6. He was received Calcium,insulin,dextrose and Bicarb drip. He was started on Levophed and Bicarb drip. He was transferred to MICU.    In the MICU,his Cr improved on Bicab drip.Nephrology on board. Levophed was weaned off and he was started on Midrodrine 10 mg TID scheduled.    Additionally, patient's Hb on Admission was 9.6(baseline 13-14). Downtrended to 7.7 and then 8.8. No overt bleed. FOBT stool negative. Iron panel, Vit B12 and Folate were unremarkable. Retic was 4%.    Patient's Troponin were initially elevated. Cardiology was consulted. They

## 2024-03-31 NOTE — PROGRESS NOTES
Renal Progress Note    Patient :  Casper Cummins; 73 y.o. MRN# [id number]  Location:  0443/0443-01  Attending:  Zia Hoyos MD  Admit Date:  3/29/2024   Hospital Day: 2    Subjective:     Following for HILARIO and hyperkalemia, admitted with symptomatic hypotension   Peak creatinine this admission was 4.1 mg/dl, potassium of 6.6.   Baseline creatinine 1.1-1.2 mg/dl.    Patient was seen and examined.  No new issues reported overnight.    Labs this morning sodium 143, potassium 4.2, chloride 107, CO2 27, BUN 48, creatinine 1.4 down from 1.5.  Transferred out of ICU this morning.  -130.  Off pressors.    On midodrine 10 mg 3 times a day.  Urine output documented as about 1.5 L and x 1 more unmeasured in the last 24 hours.  Had been on IV fluids sodium bicarb 150 mEq mEq in D5W at 100 cc an hour.    Patient seen and examined.  Alert and denies distress.  Hoping to go home today.    Renal ultrasound 3/29/2024: Right kidney measures 11.09 cm and left kidney measures 11.1 cm.   Impression: Unremarkable ultrasound of the kidneys and urinary bladder.  Outpatient Medications:     Medications Prior to Admission: lisinopril (PRINIVIL;ZESTRIL) 5 MG tablet, Take 1 tablet by mouth daily  clopidogrel (PLAVIX) 75 MG tablet, Take 1 tablet by mouth daily  bumetanide (BUMEX) 2 MG tablet, Take 1 tablet by mouth daily PT NEEDS TO CALL FOR AN APPOINTMENT FOR FURTHER REFILLS  metoprolol succinate (TOPROL XL) 25 MG extended release tablet, Take 1 tablet by mouth 2 times daily (Patient taking differently: Take 1 tablet by mouth daily)  simvastatin (ZOCOR) 80 MG tablet, Take 1 tablet by mouth nightly  potassium chloride (KLOR-CON M) 20 MEQ extended release tablet, Take 2 tablets by mouth daily for 7 days  isosorbide mononitrate (IMDUR) 30 MG CR tablet, Take 1 tablet by mouth daily  tamsulosin (FLOMAX) 0.4 MG capsule, Take 1 capsule by mouth daily    Current Medications:     Scheduled Meds:    midodrine  10 mg Oral TID WC     along with ATN from hypotension requiring pressor support. Peak creatinine this admission was 4.1 mg/dl.  Baseline creatinine seems to be around 1.1-1.2 mg/dl, now creatinine improving.  Hyperkalemia likely due to decreased distal sodium delivery, underlying renal dysfunction, metabolic acidosis and complicated by lisinopril and potassium supplementation at home.  Hypotension requiring pressor support: Resolved  Metabolic acidosis.  Anemia of chronic disease.  Coronary artery disease with history of CABG.  History of cervical stenosis.    Plan:   Okay to discontinue IV fluids.  Continue to avoid ACE/ARB for now and can be restarted as outpatient.  Okay to restart home dose Bumex in 3 days post discharge.  Continue Midodrine with hold parameters.  Okay discharge from nephrology standpoint.  BMP in 1 week post discharge and fax results to Dr. Flores office; Fax # [phone].  4.   Follow up with Dr. Flores in 3-4 weeks post d/c.     Nutrition   Please ensure that patient is on a renal diet/TF. Avoid nephrotoxic drugs/contrast exposure.    Attending Physician Statement  I have discussed the care of this patient, including pertinent history and exam findings, with the Resident/CNP. I have seen and examined the patient myself. I have reviewed and edited the key elements of all parts of the encounter with the Resident/CNP.  I agree with the assessment, plan and orders as documented by the Resident/CNP.    Aftab Laughlin MD   [facility]    This note is created with the assistance of a speech-recognition program. While intending to generate a document that actually reflects the content of the visit, no guarantees can be provided that every mistake has been identified and corrected by editing.

## 2024-03-31 NOTE — PLAN OF CARE
Problem: Discharge Planning  Goal: Discharge to home or other facility with appropriate resources  3/31/2024 1248 by Karen Kumar RN  Outcome: Progressing  3/31/2024 0923 by Jame Fuentes RN  Outcome: Progressing  Flowsheets (Taken 3/30/2024 2000 by Nancy Marlow, RN)  Discharge to home or other facility with appropriate resources: Identify barriers to discharge with patient and caregiver     Problem: Safety - Adult  Goal: Free from fall injury  3/31/2024 1248 by Karen Kumar RN  Outcome: Progressing  3/31/2024 0923 by Jame Fuentes RN  Outcome: Progressing  Flowsheets (Taken 3/30/2024 2000 by Nancy Marlow, RN)  Free From Fall Injury: Instruct family/caregiver on patient safety     Problem: ABCDS Injury Assessment  Goal: Absence of physical injury  3/31/2024 1248 by Karen Kumar RN  Outcome: Progressing  3/31/2024 0923 by Jame Fuentes RN  Outcome: Progressing  Flowsheets (Taken 3/30/2024 2000 by Nancy Marlow, RN)  Absence of Physical Injury: Implement safety measures based on patient assessment

## 2024-03-31 NOTE — PROGRESS NOTES
Component Value Date/Time     2024 02:28 AM    K 4.3 2024 02:28 AM     (H) 2024 02:28 AM    CO2 24 2024 02:28 AM    BUN 52 (H) 2024 02:28 AM    CREATININE 1.5 (H) 2024 02:28 AM     GI/Nutrition:   ADULT DIET; Regular; Low Fat/Low Chol/High Fiber/ROSA; Low Potassium (Less than 3000 mg/day); Low Phosphorus (Less than 1000 mg)   Fecal occult blood test pending   Prophylaxis : none     ID:   Afebrile   Monitor for fever, leukocytosis   Abx : none    Lab Results   Component Value Date/Time    WBC 4.6 2024 02:28 AM    WBC 4.3 2024 04:33 AM    WBC 6.3 2024 10:42 AM     Temp (24hrs), Av.1 °F (36.7 °C), Min:98.1 °F (36.7 °C), Max:98.2 °F (36.8 °C)     Hematology:  Anemia    No complaints of bleeding   Transfuse for Hgb < 7 or clinically indicated     Lab Results   Component Value Date/Time    HGB 7.7 (L) 2024 02:28 AM    HGB 8.0 (L) 2024 04:33 AM    HCT 23.7 (L) 2024 02:28 AM    HCT 24.7 (L) 2024 04:33 AM     Prophylaxis:   DVT : EPC cuffs   GI : none        Hallie Meadows DO  Emergency Medicine Resident, PGY-1  3/31/2024 7:54 AM

## 2024-04-01 LAB
ANA SER QL IA: NEGATIVE
ANCA MYELOPEROXIDASE: <0.3 AU/ML (ref 0–3.5)
ANCA PROTEINASE 3: <0.7 AU/ML (ref 0–2)
ANION GAP SERPL CALCULATED.3IONS-SCNC: 7 MMOL/L (ref 9–16)
ANION GAP SERPL CALCULATED.3IONS-SCNC: 8 MMOL/L (ref 9–16)
BASOPHILS # BLD: 0.03 K/UL (ref 0–0.2)
BASOPHILS NFR BLD: 1 % (ref 0–2)
BUN SERPL-MCNC: 30 MG/DL (ref 8–23)
BUN SERPL-MCNC: 33 MG/DL (ref 8–23)
CA-I BLD-SCNC: 1.22 MMOL/L (ref 1.13–1.33)
CALCIUM SERPL-MCNC: 8.7 MG/DL (ref 8.6–10.4)
CALCIUM SERPL-MCNC: 8.8 MG/DL (ref 8.6–10.4)
CHLORIDE SERPL-SCNC: 106 MMOL/L (ref 98–107)
CHLORIDE SERPL-SCNC: 110 MMOL/L (ref 98–107)
CO2 SERPL-SCNC: 26 MMOL/L (ref 20–31)
CO2 SERPL-SCNC: 26 MMOL/L (ref 20–31)
CREAT SERPL-MCNC: 1.4 MG/DL (ref 0.7–1.2)
CREAT SERPL-MCNC: 1.5 MG/DL (ref 0.7–1.2)
DSDNA IGG SER QL IA: <0.5 IU/ML
EKG ATRIAL RATE: 61 BPM
EKG ATRIAL RATE: 68 BPM
EKG P AXIS: 42 DEGREES
EKG P AXIS: 55 DEGREES
EKG P-R INTERVAL: 212 MS
EKG P-R INTERVAL: 240 MS
EKG Q-T INTERVAL: 430 MS
EKG Q-T INTERVAL: 432 MS
EKG QRS DURATION: 86 MS
EKG QRS DURATION: 86 MS
EKG QTC CALCULATION (BAZETT): 420 MS
EKG QTC CALCULATION (BAZETT): 457 MS
EKG R AXIS: 52 DEGREES
EKG R AXIS: 58 DEGREES
EKG T AXIS: 63 DEGREES
EKG T AXIS: 72 DEGREES
EKG VENTRICULAR RATE: 57 BPM
EKG VENTRICULAR RATE: 68 BPM
EOSINOPHIL # BLD: 0.2 K/UL (ref 0–0.44)
EOSINOPHILS RELATIVE PERCENT: 4 % (ref 1–4)
ERYTHROCYTE [DISTWIDTH] IN BLOOD BY AUTOMATED COUNT: 14.6 % (ref 11.8–14.4)
GFR SERPL CREATININE-BSD FRML MDRD: 50 ML/MIN/1.73M2
GFR SERPL CREATININE-BSD FRML MDRD: 53 ML/MIN/1.73M2
GLUCOSE SERPL-MCNC: 105 MG/DL (ref 74–99)
GLUCOSE SERPL-MCNC: 126 MG/DL (ref 74–99)
HCT VFR BLD AUTO: 26.1 % (ref 40.7–50.3)
HGB BLD-MCNC: 8 G/DL (ref 13–17)
IMM GRANULOCYTES # BLD AUTO: <0.03 K/UL (ref 0–0.3)
IMM GRANULOCYTES NFR BLD: 0 %
LYMPHOCYTES NFR BLD: 0.89 K/UL (ref 1.1–3.7)
LYMPHOCYTES RELATIVE PERCENT: 18 % (ref 24–43)
MAGNESIUM SERPL-MCNC: 2.2 MG/DL (ref 1.6–2.4)
MCH RBC QN AUTO: 33.2 PG (ref 25.2–33.5)
MCHC RBC AUTO-ENTMCNC: 30.7 G/DL (ref 28.4–34.8)
MCV RBC AUTO: 108.3 FL (ref 82.6–102.9)
MONOCYTES NFR BLD: 0.47 K/UL (ref 0.1–1.2)
MONOCYTES NFR BLD: 9 % (ref 3–12)
NEUTROPHILS NFR BLD: 68 % (ref 36–65)
NEUTS SEG NFR BLD: 3.43 K/UL (ref 1.5–8.1)
NRBC BLD-RTO: 0 PER 100 WBC
NUCLEAR IGG SER IA-RTO: 0.1 U/ML
PATH REV BLD -IMP: NORMAL
PLATELET # BLD AUTO: 85 K/UL (ref 138–453)
PMV BLD AUTO: 9.4 FL (ref 8.1–13.5)
POTASSIUM SERPL-SCNC: 4.8 MMOL/L (ref 3.7–5.3)
POTASSIUM SERPL-SCNC: 4.8 MMOL/L (ref 3.7–5.3)
RBC # BLD AUTO: 2.41 M/UL (ref 4.21–5.77)
RBC # BLD: ABNORMAL 10*6/UL
RBC # BLD: ABNORMAL 10*6/UL
SODIUM SERPL-SCNC: 140 MMOL/L (ref 136–145)
SODIUM SERPL-SCNC: 143 MMOL/L (ref 136–145)
SURGICAL PATHOLOGY REPORT: NORMAL
WBC OTHER # BLD: 5 K/UL (ref 3.5–11.3)

## 2024-04-01 PROCEDURE — 97535 SELF CARE MNGMENT TRAINING: CPT

## 2024-04-01 PROCEDURE — 2580000003 HC RX 258

## 2024-04-01 PROCEDURE — 6370000000 HC RX 637 (ALT 250 FOR IP)

## 2024-04-01 PROCEDURE — 83735 ASSAY OF MAGNESIUM: CPT

## 2024-04-01 PROCEDURE — 1200000000 HC SEMI PRIVATE

## 2024-04-01 PROCEDURE — 99233 SBSQ HOSP IP/OBS HIGH 50: CPT | Performed by: INTERNAL MEDICINE

## 2024-04-01 PROCEDURE — 99232 SBSQ HOSP IP/OBS MODERATE 35: CPT | Performed by: INTERNAL MEDICINE

## 2024-04-01 PROCEDURE — 99233 SBSQ HOSP IP/OBS HIGH 50: CPT | Performed by: SURGERY

## 2024-04-01 PROCEDURE — 97161 PT EVAL LOW COMPLEX 20 MIN: CPT

## 2024-04-01 PROCEDURE — 80048 BASIC METABOLIC PNL TOTAL CA: CPT

## 2024-04-01 PROCEDURE — 97530 THERAPEUTIC ACTIVITIES: CPT

## 2024-04-01 PROCEDURE — 85025 COMPLETE CBC W/AUTO DIFF WBC: CPT

## 2024-04-01 PROCEDURE — 6360000002 HC RX W HCPCS

## 2024-04-01 PROCEDURE — 93010 ELECTROCARDIOGRAM REPORT: CPT | Performed by: INTERNAL MEDICINE

## 2024-04-01 PROCEDURE — 82330 ASSAY OF CALCIUM: CPT

## 2024-04-01 PROCEDURE — 97166 OT EVAL MOD COMPLEX 45 MIN: CPT

## 2024-04-01 PROCEDURE — 36415 COLL VENOUS BLD VENIPUNCTURE: CPT

## 2024-04-01 PROCEDURE — 94761 N-INVAS EAR/PLS OXIMETRY MLT: CPT

## 2024-04-01 RX ADMIN — HEPARIN SODIUM 5000 UNITS: 5000 INJECTION INTRAVENOUS; SUBCUTANEOUS at 16:37

## 2024-04-01 RX ADMIN — SODIUM CHLORIDE, PRESERVATIVE FREE 10 ML: 5 INJECTION INTRAVENOUS at 19:31

## 2024-04-01 RX ADMIN — ATORVASTATIN CALCIUM 80 MG: 80 TABLET, FILM COATED ORAL at 21:21

## 2024-04-01 RX ADMIN — SODIUM CHLORIDE, PRESERVATIVE FREE 10 ML: 5 INJECTION INTRAVENOUS at 08:28

## 2024-04-01 RX ADMIN — CLOPIDOGREL BISULFATE 75 MG: 75 TABLET ORAL at 08:29

## 2024-04-01 RX ADMIN — ACETAMINOPHEN 650 MG: 325 TABLET ORAL at 08:29

## 2024-04-01 RX ADMIN — ACETAMINOPHEN 650 MG: 325 TABLET ORAL at 21:21

## 2024-04-01 RX ADMIN — HEPARIN SODIUM 5000 UNITS: 5000 INJECTION INTRAVENOUS; SUBCUTANEOUS at 21:21

## 2024-04-01 RX ADMIN — HEPARIN SODIUM 5000 UNITS: 5000 INJECTION INTRAVENOUS; SUBCUTANEOUS at 05:20

## 2024-04-01 ASSESSMENT — PAIN SCALES - GENERAL
PAINLEVEL_OUTOF10: 3
PAINLEVEL_OUTOF10: 5
PAINLEVEL_OUTOF10: 6
PAINLEVEL_OUTOF10: 5
PAINLEVEL_OUTOF10: 0

## 2024-04-01 ASSESSMENT — PAIN DESCRIPTION - ORIENTATION
ORIENTATION: LEFT

## 2024-04-01 ASSESSMENT — PAIN DESCRIPTION - LOCATION
LOCATION: FOOT

## 2024-04-01 NOTE — PROGRESS NOTES
Renal Progress Note    Patient :  Casper Cummins; 73 y.o. MRN# [id number]  Location:  0443/0443-01  Attending:  Danny Starr MD  Admit Date:  3/29/2024   Hospital Day: 3    Subjective:     Following for HILARIO and hyperkalemia, admitted with symptomatic hypotension   Peak creatinine this admission was 4.1 mg/dl, potassium of 6.6.   Baseline creatinine 1.1-1.2 mg/dl.    Patient was seen and examined.   No new issues overnight.     BMP results from today reviewed sodium 143, potassium 4.8, chloride 110, bicarb 26, calcium 8.8, BUN 33, creatinine 1.4 mg/dl. Mg 2.2.   Urine output documented as about 800 cc and x 1 more in the last 24 hours.    On midodrine 10 mg 3 times a day.    Renal ultrasound 3/29/2024: Right kidney measures 11.09 cm and left kidney measures 11.1 cm.   Impression: Unremarkable ultrasound of the kidneys and urinary bladder.  Outpatient Medications:     Medications Prior to Admission: lisinopril (PRINIVIL;ZESTRIL) 5 MG tablet, Take 1 tablet by mouth daily  clopidogrel (PLAVIX) 75 MG tablet, Take 1 tablet by mouth daily  bumetanide (BUMEX) 2 MG tablet, Take 1 tablet by mouth daily PT NEEDS TO CALL FOR AN APPOINTMENT FOR FURTHER REFILLS  metoprolol succinate (TOPROL XL) 25 MG extended release tablet, Take 1 tablet by mouth 2 times daily (Patient taking differently: Take 1 tablet by mouth daily)  simvastatin (ZOCOR) 80 MG tablet, Take 1 tablet by mouth nightly  potassium chloride (KLOR-CON M) 20 MEQ extended release tablet, Take 2 tablets by mouth daily for 7 days  isosorbide mononitrate (IMDUR) 30 MG CR tablet, Take 1 tablet by mouth daily  tamsulosin (FLOMAX) 0.4 MG capsule, Take 1 capsule by mouth daily    Current Medications:     Scheduled Meds:    midodrine  10 mg Oral TID WC    clopidogrel  75 mg Oral Daily    atorvastatin  80 mg Oral Nightly    sodium chloride flush  5-40 mL IntraVENous 2 times per day    heparin (porcine)  5,000 Units SubCUTAneous 3 times per day     Continuous Infusions:

## 2024-04-01 NOTE — PLAN OF CARE
Problem: Discharge Planning  Goal: Discharge to home or other facility with appropriate resources  4/1/2024 1047 by Karen Kumar RN  Outcome: Progressing  4/1/2024 0000 by Pola Slade RN  Outcome: Progressing     Problem: Safety - Adult  Goal: Free from fall injury  4/1/2024 1047 by Karen Kumar RN  Outcome: Progressing  4/1/2024 0000 by Pola Slade RN  Outcome: Progressing     Problem: ABCDS Injury Assessment  Goal: Absence of physical injury  4/1/2024 1047 by Karen Kumar RN  Outcome: Progressing  4/1/2024 0000 by Pola Slade RN  Outcome: Progressing     Problem: Pain  Goal: Verbalizes/displays adequate comfort level or baseline comfort level  Outcome: Progressing

## 2024-04-01 NOTE — PROGRESS NOTES
[facility]  Internal Medicine Teaching Residency Program  Inpatient Daily Progress Note  ______________________________________________________________________________    Patient: Casper Cummins  YOB: 1950   MRN:[id number]    Acct: [id number]     Room: [facility]3/0443-01  Admit date: 3/29/2024  Today's date: 04/01/24  Number of days in the hospital: 3    SUBJECTIVE   Admitting Diagnosis: Metabolic acidosis  CC:  dizziness  Pt examined at bedside. Chart & results reviewed.     - Patient afebrile, hemodynamically stable and saturating well on room air.  - No acute concerns.  - Nephrology D/C Bicarb drip. Nephrology recommends home dose Bumex  3 days post discharge. OK from nephro standpoint to d/C.  - Awaiting ECHO    BRIEF HISTORY     Patient is a 73-year-old male with history of spinal canal stenosis, CAD status post CABG in 1993 and multiple stent,HLD, hypertension and RALPH presented with dizziness.     On 3/29, patient had a neurosurgery appointment for chronic neck pain.  There he was found to be hypotensive. He follows with [place] [facility] outpatient and had an appointment with Dr. Saravia on 3/28.  At home, he is on Toprol 25 twice daily, lisinopril 5 OD and Bumex 2 OD. Dr. Saravia decreased Lisinopril from 5 mg OD to 2.5 mg OD.     He was brought to the ED. He was hypotensive and had HILARIO with Cr 4.4, NAGMA and potassium of 6.6. He was received Calcium,insulin,dextrose and Bicarb drip. He was started on Levophed and Bicarb drip. He was transferred to MICU.     In the MICU,his Cr improved on Bicab drip.Nephrology on board. Levophed was weaned off and he was started on Midrodrine 10 mg TID scheduled.     Additionally, patient's Hb on Admission was 9.6(baseline 13-14). Downtrended to 7.7 and then 8.8. No overt bleed. FOBT stool negative. Iron panel, Vit B12 and Folate were unremarkable. Retic was 4%.     Patient's Troponin were initially elevated. Cardiology

## 2024-04-01 NOTE — PROGRESS NOTES
[facility]  Progress Note                   Date:   4/1/2024  Patient name: Casper Cummins  Date of admission:  3/29/2024 10:15 AM  MRN:   [id number]  YOB: 1950  PCP: Diane Bowie DO    Reason for Admission: Hyperkalemia [E87.5]  Elevated serum creatinine [R79.89]  Hypotension, unspecified hypotension type [I95.9]  Acute metabolic acidosis [E87.21]    Subjective:       Clinical Changes /Abnormalities:Patient seen and examined. Denies chest pain or shortness of breath. Tele/vitals/labs reviewed .     Review of Systems    Medications:   Scheduled Meds:   midodrine  10 mg Oral TID WC    clopidogrel  75 mg Oral Daily    atorvastatin  80 mg Oral Nightly    sodium chloride flush  5-40 mL IntraVENous 2 times per day    heparin (porcine)  5,000 Units SubCUTAneous 3 times per day     Continuous Infusions:   sodium chloride 10 mL/hr at 03/30/24 1717     CBC:   Recent Labs     03/30/24  0433 03/31/24  0228 03/31/24  0926 04/01/24  0844   WBC 4.3 4.6  --  5.0   HGB 8.0* 7.7* 8.8* 8.0*   PLT 94* 83*  --  85*     BMP:    Recent Labs     03/31/24  0926 03/31/24  1934 04/01/24  0844    142 143   K 4.6 4.4 4.8    107 110*   CO2 21 24 26   BUN 47* 42* 33*   CREATININE 1.5* 1.5* 1.4*   GLUCOSE 117* 101* 105*     Hepatic:  Recent Labs     03/29/24  1042   AST 17   ALT 19   BILITOT 0.3   ALKPHOS 78     Troponin:   Recent Labs     03/29/24  1042 03/29/24  1130 03/29/24  1912   TROPHS 52* 49* 41*     BNP: No results for input(s): \"BNP\" in the last 72 hours.  Lipids: No results for input(s): \"CHOL\", \"HDL\" in the last 72 hours.    Invalid input(s): \"LDLCALCU\"  INR:   Recent Labs     03/30/24  0433   INR 1.1       DATA:    Diagnostics:    1. Coronary artery disease with CABG in 1993 with LIMA-LAD, SVG-D1, SVG-OM1, SVG-OM2 and SVG-RCA.  2. Heart catheterization in 2000 suggested occluded SVG-OM1 and OM2 with other grafts patent.  3. Ischemic cardiomyopathy, ejection fraction reported about  45% before.  4. Hyperlipidemia.  5. Arthritis with previous knee surgery.  6. Hypertension.  7. Obstructive sleep apnea.  8. Abnormal stress test in May 2015 triggered cath, has patent LIMA to the LAD and native LCX stent with total occluded RCA, with a new lesion in the OMl branch proximal area, required LAURA.  9. Stress test in Oct 2017: Minimal lateral ischemia with inferolateral infarction, EF 50%.  10. Stress test 2/2021: Anterolateral ischemia, moderate severity, with inferior infarction.  11. Heart cath 3/2021: patent LIMA -LAD, native LCX stent. Occluded other grafts and RCA (Some collaterals).  12. Echocardiogram 6/8/22: EF 55%, mild MR, DD.     EKG        Cath  2021  Findings:     LMCA: Mild irregularities 10-20%.     LAD: proximal - mid 100% stenosis  LIMA -LAD is patent  SVg -D is occluded     LCx: Dominant  Mid area 40% at OM1 take off  OM1: Large with patent stent  OM2: Minimal 30% stenosis  SVg -OM is occluded     RCA: 100% proximal lesion  Collateral filling PDA and distal RCA  SVG -RCA is occluded 100%      Coronary Tree      Dominance: Right     LV Analysis  LV function assessed as:Abnormal.  Ejection Fraction: 40%        Conclusions:  Patent LIMA -LAD   Occluded all other grafts   Occluded RCA () with some collateral from the left.   Patent LCX /OM stents   Mildly reduced LV function     Recommendations:   Medical treatments   Ad Lasix   If no improvement will attempt  of the RCA since its large.          Objective:   Vitals: BP (!) 113/58   Pulse 74   Temp 98.6 °F (37 °C) (Axillary)   Resp 20   Ht 1.778 m (5' 10\")   Wt 100.5 kg (221 lb 9 oz)   SpO2 97%   BMI 31.79 kg/m²   General appearance: alert and cooperative with exam  HEENT: Head: Normocephalic, no lesions, without obvious abnormality.  Neck:no JVD, trachea midline, no adenopathy  Lungs: Clear to auscultation  Heart: Regular rate and rhythm, s1/s2 auscultated, no murmurs  Abdomen: soft, non-tender, bowel sounds

## 2024-04-01 NOTE — PLAN OF CARE
Problem: Discharge Planning  Goal: Discharge to home or other facility with appropriate resources  4/1/2024 0000 by Pola Slade RN  Outcome: Progressing  3/31/2024 1248 by Karen Kumar RN  Outcome: Progressing     Problem: Safety - Adult  Goal: Free from fall injury  4/1/2024 0000 by Pola Slade RN  Outcome: Progressing  3/31/2024 1248 by Karen Kumar RN  Outcome: Progressing     Problem: ABCDS Injury Assessment  Goal: Absence of physical injury  4/1/2024 0000 by Pola Slade RN  Outcome: Progressing  3/31/2024 1248 by Karen Kumar RN  Outcome: Progressing

## 2024-04-01 NOTE — PROGRESS NOTES
Inpatient Mobility Raw Score : 23  AM-PAC Inpatient T-Scale Score : 56.93  Mobility Inpatient CMS 0-100% Score: 11.2  Mobility Inpatient CMS G-Code Modifier : CI  Goals  Short Term Goals  Time Frame for Short Term Goals: 6  Short Term Goal 1: Pt to perform independent bed mobility and functional transfers  Short Term Goal 2: Pt to independently ambulate 300ft w/ no AD independently  Short Term Goal 3: Ascend/descend 2 stairs with no rail to simulate community ambulation independently  Patient Goals   Patient Goals : To go home       Education  Patient Education  Education Given To: Patient  Education Provided: Role of Therapy;Plan of Care  Education Provided Comments: Use of RW to decrease LLE pain with weightbearing  Education Method: Verbal  Barriers to Learning: None  Education Outcome: Verbalized understanding      Therapy Time   Individual Concurrent Group Co-treatment   Time In 0948         Time Out 1016         Minutes 28         Timed Code Treatment Minutes: 23 Minutes       Luz Bradshaw, PT

## 2024-04-01 NOTE — PROGRESS NOTES
Occupational Therapy  Facility/Department: [address] ONC/MED SURG  Occupational Therapy Initial Assessment    Name: Casper Cummins  : 1950  MRN: [id number]  Date of Service: 2024  Chief Complaint   Patient presents with    Dizziness       Discharge Recommendations:  Patient would benefit from continued therapy after discharge  OT Equipment Recommendations  Equipment Needed: Yes  Mobility Devices: ADL Assistive Devices  ADL Assistive Devices: Transfer Tub Bench;Reacher;Long-handled Shoe Horn;Long-handled Sponge;Sock-Aid Hard       Patient Diagnosis(es): The primary encounter diagnosis was Hypotension, unspecified hypotension type. Diagnoses of Elevated serum creatinine, Hyperkalemia, Acute metabolic acidosis, and S/P LAURA - OM1 5/18/15-Dr. pearce were also pertinent to this visit.  Past Medical History:  has a past medical history of Arthritis, Coronary artery disease (CAD) excluded, Enlarged prostate, Hyperlipidemia, Hypertension, Obesity, Positive cardiac stress test, SOB (shortness of breath), and Wears partial dentures.  Past Surgical History:  has a past surgical history that includes Total knee arthroplasty (Bilateral); Cataract removal; Colonoscopy (3/12/2019); Colonoscopy (3/12/2019); Coronary artery bypass graft (); Coronary angioplasty with stent ([id number]); Colonoscopy (N/A, 10/20/2020); Colonoscopy (10/20/2020); eye surgery; and Cardiac catheterization (2021).       Assessment   Performance deficits / Impairments: Decreased functional mobility ;Decreased endurance;Decreased ADL status;Decreased high-level IADLs;Decreased balance  Prognosis: Good  Decision Making: Medium Complexity  REQUIRES OT FOLLOW-UP: Yes  Activity Tolerance  Activity Tolerance: Patient limited by pain;Patient Tolerated treatment well        Plan   Occupational Therapy Plan  Times Per Week: 3-5x     Restrictions  Restrictions/Precautions  Restrictions/Precautions: Fall Risk, General Precautions  Required Braces or

## 2024-04-02 ENCOUNTER — APPOINTMENT (OUTPATIENT)
Age: 74
DRG: 682 | End: 2024-04-02
Payer: MEDICARE

## 2024-04-02 VITALS
HEART RATE: 71 BPM | WEIGHT: 221 LBS | SYSTOLIC BLOOD PRESSURE: 122 MMHG | TEMPERATURE: 99.3 F | DIASTOLIC BLOOD PRESSURE: 63 MMHG | RESPIRATION RATE: 20 BRPM | OXYGEN SATURATION: 96 % | BODY MASS INDEX: 31.64 KG/M2 | HEIGHT: 70 IN

## 2024-04-02 PROBLEM — N17.0 ATN (ACUTE TUBULAR NECROSIS) (HCC): Status: ACTIVE | Noted: 2024-04-02

## 2024-04-02 LAB
BASOPHILS # BLD: 0.03 K/UL (ref 0–0.2)
BASOPHILS NFR BLD: 1 % (ref 0–2)
ECHO AO ROOT DIAM: 3.2 CM
ECHO AO ROOT INDEX: 1.47 CM/M2
ECHO AV AREA PEAK VELOCITY: 2.6 CM2
ECHO AV AREA VTI: 2.4 CM2
ECHO AV AREA/BSA PEAK VELOCITY: 1.2 CM2/M2
ECHO AV AREA/BSA VTI: 1.1 CM2/M2
ECHO AV MEAN GRADIENT: 5 MMHG
ECHO AV MEAN VELOCITY: 1.1 M/S
ECHO AV PEAK GRADIENT: 10 MMHG
ECHO AV PEAK VELOCITY: 1.6 M/S
ECHO AV VELOCITY RATIO: 0.81
ECHO AV VTI: 34.2 CM
ECHO BSA: 2.22 M2
ECHO EST RA PRESSURE: 3 MMHG
ECHO LA AREA 2C: 20 CM2
ECHO LA AREA 4C: 18.5 CM2
ECHO LA DIAMETER INDEX: 1.83 CM/M2
ECHO LA DIAMETER: 4 CM
ECHO LA MAJOR AXIS: 5.1 CM
ECHO LA MINOR AXIS: 4.9 CM
ECHO LA TO AORTIC ROOT RATIO: 1.25
ECHO LA VOL BP: 60 ML (ref 18–58)
ECHO LA VOL MOD A2C: 65 ML (ref 18–58)
ECHO LA VOL MOD A4C: 54 ML (ref 18–58)
ECHO LA VOL/BSA BIPLANE: 28 ML/M2 (ref 16–34)
ECHO LA VOLUME INDEX MOD A2C: 30 ML/M2 (ref 16–34)
ECHO LA VOLUME INDEX MOD A4C: 25 ML/M2 (ref 16–34)
ECHO LV E' LATERAL VELOCITY: 11 CM/S
ECHO LV E' SEPTAL VELOCITY: 7 CM/S
ECHO LV EDV A2C: 82 ML
ECHO LV EDV A4C: 68 ML
ECHO LV EDV INDEX A4C: 31 ML/M2
ECHO LV EDV NDEX A2C: 38 ML/M2
ECHO LV EJECTION FRACTION A2C: 53 %
ECHO LV EJECTION FRACTION A4C: 57 %
ECHO LV EJECTION FRACTION BIPLANE: 57 % (ref 55–100)
ECHO LV ESV A2C: 39 ML
ECHO LV ESV A4C: 29 ML
ECHO LV ESV INDEX A2C: 18 ML/M2
ECHO LV ESV INDEX A4C: 13 ML/M2
ECHO LV FRACTIONAL SHORTENING: 9 % (ref 28–44)
ECHO LV INTERNAL DIMENSION DIASTOLE INDEX: 2.57 CM/M2
ECHO LV INTERNAL DIMENSION DIASTOLIC: 5.6 CM (ref 4.2–5.9)
ECHO LV INTERNAL DIMENSION SYSTOLIC INDEX: 2.34 CM/M2
ECHO LV INTERNAL DIMENSION SYSTOLIC: 5.1 CM
ECHO LV IVSD: 1 CM (ref 0.6–1)
ECHO LV MASS 2D: 234.3 G (ref 88–224)
ECHO LV MASS INDEX 2D: 107.5 G/M2 (ref 49–115)
ECHO LV POSTERIOR WALL DIASTOLIC: 1.1 CM (ref 0.6–1)
ECHO LV RELATIVE WALL THICKNESS RATIO: 0.39
ECHO LVOT AREA: 3.1 CM2
ECHO LVOT AV VTI INDEX: 0.76
ECHO LVOT DIAM: 2 CM
ECHO LVOT MEAN GRADIENT: 4 MMHG
ECHO LVOT PEAK GRADIENT: 7 MMHG
ECHO LVOT PEAK VELOCITY: 1.3 M/S
ECHO LVOT STROKE VOLUME INDEX: 37.3 ML/M2
ECHO LVOT SV: 81.3 ML
ECHO LVOT VTI: 25.9 CM
ECHO MV A VELOCITY: 0.88 M/S
ECHO MV AREA VTI: 2 CM2
ECHO MV E DECELERATION TIME (DT): 212 MS
ECHO MV E VELOCITY: 0.99 M/S
ECHO MV E/A RATIO: 1.13
ECHO MV E/E' LATERAL: 9
ECHO MV E/E' RATIO (AVERAGED): 11.57
ECHO MV LVOT VTI INDEX: 1.59
ECHO MV MAX VELOCITY: 1.6 M/S
ECHO MV MEAN GRADIENT: 3 MMHG
ECHO MV MEAN VELOCITY: 0.8 M/S
ECHO MV PEAK GRADIENT: 10 MMHG
ECHO MV VTI: 41.2 CM
ECHO PV MAX VELOCITY: 1.2 M/S
ECHO PV PEAK GRADIENT: 5 MMHG
ECHO RA AREA 4C: 19 CM2
ECHO RIGHT VENTRICULAR SYSTOLIC PRESSURE (RVSP): 38 MMHG
ECHO RV BASAL DIMENSION: 3.9 CM
ECHO RV FREE WALL PEAK S': 11 CM/S
ECHO RV TAPSE: 1.6 CM (ref 1.7–?)
ECHO TV REGURGITANT MAX VELOCITY: 2.95 M/S
ECHO TV REGURGITANT PEAK GRADIENT: 35 MMHG
EOSINOPHIL # BLD: 0.2 K/UL (ref 0–0.44)
EOSINOPHILS RELATIVE PERCENT: 4 % (ref 1–4)
ERYTHROCYTE [DISTWIDTH] IN BLOOD BY AUTOMATED COUNT: 14.6 % (ref 11.8–14.4)
HCT VFR BLD AUTO: 25.1 % (ref 40.7–50.3)
HGB BLD-MCNC: 7.9 G/DL (ref 13–17)
IMM GRANULOCYTES # BLD AUTO: <0.03 K/UL (ref 0–0.3)
IMM GRANULOCYTES NFR BLD: 0 %
INTERPRETATION SERPL IFE-IMP: NORMAL
LYMPHOCYTES NFR BLD: 0.9 K/UL (ref 1.1–3.7)
LYMPHOCYTES RELATIVE PERCENT: 20 % (ref 24–43)
MCH RBC QN AUTO: 33.2 PG (ref 25.2–33.5)
MCHC RBC AUTO-ENTMCNC: 31.5 G/DL (ref 28.4–34.8)
MCV RBC AUTO: 105.5 FL (ref 82.6–102.9)
MONOCYTES NFR BLD: 0.43 K/UL (ref 0.1–1.2)
MONOCYTES NFR BLD: 10 % (ref 3–12)
NEUTROPHILS NFR BLD: 65 % (ref 36–65)
NEUTS SEG NFR BLD: 2.95 K/UL (ref 1.5–8.1)
NRBC BLD-RTO: 0 PER 100 WBC
PATH REV: NORMAL
PLATELET # BLD AUTO: 92 K/UL (ref 138–453)
PMV BLD AUTO: 9.2 FL (ref 8.1–13.5)
RBC # BLD AUTO: 2.38 M/UL (ref 4.21–5.77)
RBC # BLD: ABNORMAL 10*6/UL
RBC # BLD: ABNORMAL 10*6/UL
WBC OTHER # BLD: 4.5 K/UL (ref 3.5–11.3)

## 2024-04-02 PROCEDURE — 36415 COLL VENOUS BLD VENIPUNCTURE: CPT

## 2024-04-02 PROCEDURE — 85025 COMPLETE CBC W/AUTO DIFF WBC: CPT

## 2024-04-02 PROCEDURE — 99232 SBSQ HOSP IP/OBS MODERATE 35: CPT | Performed by: INTERNAL MEDICINE

## 2024-04-02 PROCEDURE — 2580000003 HC RX 258

## 2024-04-02 PROCEDURE — 6370000000 HC RX 637 (ALT 250 FOR IP)

## 2024-04-02 PROCEDURE — 93306 TTE W/DOPPLER COMPLETE: CPT | Performed by: INTERNAL MEDICINE

## 2024-04-02 PROCEDURE — 93306 TTE W/DOPPLER COMPLETE: CPT

## 2024-04-02 PROCEDURE — 6360000002 HC RX W HCPCS

## 2024-04-02 RX ORDER — MIDODRINE HYDROCHLORIDE 5 MG/1
5 TABLET ORAL 3 TIMES DAILY PRN
Status: DISCONTINUED | OUTPATIENT
Start: 2024-04-02 | End: 2024-04-02 | Stop reason: HOSPADM

## 2024-04-02 RX ORDER — MIDODRINE HYDROCHLORIDE 5 MG/1
5 TABLET ORAL 3 TIMES DAILY PRN
Qty: 90 TABLET | Refills: 3 | Status: SHIPPED | OUTPATIENT
Start: 2024-04-02

## 2024-04-02 RX ADMIN — HEPARIN SODIUM 5000 UNITS: 5000 INJECTION INTRAVENOUS; SUBCUTANEOUS at 04:16

## 2024-04-02 RX ADMIN — HEPARIN SODIUM 5000 UNITS: 5000 INJECTION INTRAVENOUS; SUBCUTANEOUS at 14:31

## 2024-04-02 RX ADMIN — ACETAMINOPHEN 650 MG: 325 TABLET ORAL at 10:27

## 2024-04-02 RX ADMIN — CLOPIDOGREL BISULFATE 75 MG: 75 TABLET ORAL at 07:30

## 2024-04-02 RX ADMIN — SODIUM CHLORIDE, PRESERVATIVE FREE 10 ML: 5 INJECTION INTRAVENOUS at 07:30

## 2024-04-02 ASSESSMENT — PAIN SCALES - GENERAL: PAINLEVEL_OUTOF10: 0

## 2024-04-02 NOTE — PROGRESS NOTES
Pharmacy Intern New Medication Counseling Note    Medication counseling provided to Casper Cummins   New medications reviewed: Midodrine     Handouts provided to patient include: Medication Side Effects brochure    Discussed recently initiated medication therapy with patient utilizing teachback method.  Reviewed uses and possible side effects of medication and answered all medication-related questions.  Patient verbalized understanding.    Giuliana Block, Pharmacy Intern

## 2024-04-02 NOTE — PROGRESS NOTES
[facility]  Internal Medicine Teaching Residency Program  Inpatient Daily Progress Note  ______________________________________________________________________________    Patient: Casper Cummins  YOB: 1950   MRN:[id number]    Acct: [id number]     Room: 0443/0443-01  Admit date: 3/29/2024  Today's date: 04/02/24  Number of days in the hospital: 4    SUBJECTIVE   Admitting Diagnosis: Metabolic acidosis  CC:  dizziness  Pt examined at bedside. Chart & results reviewed.     - Patient afebrile, hemodynamically stable and saturating well on room air.  -Nephrology is okay for the patient to be discharged with plan to start Bumex 3 days after the discharge and follow-up with nephrology in 2 to 3 weeks.  -Patient has been working with physical therapy.  -2D echo can be done as an outpatient.  -Discussed the patient with RN, no acute issues noted overnight.  -Discharge today.    BRIEF HISTORY     Patient is a 73-year-old male with history of spinal canal stenosis, CAD status post CABG in 1993 and multiple stent,HLD, hypertension and RALPH presented with dizziness.     On 3/29, patient had a neurosurgery appointment for chronic neck pain.  There he was found to be hypotensive. He follows with [place] [facility] outpatient and had an appointment with Dr. Saravia on 3/28.  At home, he is on Toprol 25 twice daily, lisinopril 5 OD and Bumex 2 OD. Dr. Saravia decreased Lisinopril from 5 mg OD to 2.5 mg OD.     He was brought to the ED. He was hypotensive and had HILARIO with Cr 4.4, NAGMA and potassium of 6.6. He was received Calcium,insulin,dextrose and Bicarb drip. He was started on Levophed and Bicarb drip. He was transferred to MICU.     In the MICU,his Cr improved on Bicab drip.Nephrology on board. Levophed was weaned off and he was started on Midrodrine 10 mg TID scheduled.     Additionally, patient's Hb on Admission was 9.6(baseline 13-14). Downtrended to 7.7 and then  Medicationssodium chloride flush, 5-40 mL, PRN  sodium chloride, , PRN  ondansetron, 4 mg, Q8H PRN   Or  ondansetron, 4 mg, Q6H PRN  polyethylene glycol, 17 g, Daily PRN  acetaminophen, 650 mg, Q6H PRN   Or  acetaminophen, 650 mg, Q6H PRN        Diagnostic Labs:  CBC:   Recent Labs     03/31/24  0228 03/31/24  0926 04/01/24  0844   WBC 4.6  --  5.0   RBC 2.28*  --  2.41*   HGB 7.7* 8.8* 8.0*   HCT 23.7* 26.4* 26.1*   .9*  --  108.3*   RDW 15.1*  --  14.6*   PLT 83*  --  85*     BMP:   Recent Labs     03/31/24  1934 04/01/24  0844 04/01/24  1356    143 140   K 4.4 4.8 4.8    110* 106   CO2 24 26 26   BUN 42* 33* 30*   CREATININE 1.5* 1.4* 1.5*     BNP: No results for input(s): \"BNP\" in the last 72 hours.  PT/INR:   No results for input(s): \"PROTIME\", \"INR\" in the last 72 hours.    APTT: No results for input(s): \"APTT\" in the last 72 hours.  CARDIAC ENZYMES: No results for input(s): \"CKMB\", \"CKMBINDEX\", \"TROPONINI\" in the last 72 hours.    Invalid input(s): \"CKTOTAL;3\"  FASTING LIPID PANEL:  Lab Results   Component Value Date    CHOL 164 03/30/2023    HDL 48 03/30/2023    TRIG 179 (H) 03/30/2023     LIVER PROFILE:   No results for input(s): \"AST\", \"ALT\", \"ALB\", \"BILIDIR\", \"BILITOT\", \"ALKPHOS\" in the last 72 hours.     MICROBIOLOGY:   Lab Results   Component Value Date/Time    CULTURE ESCHERICHIA COLI >039189 CFU/ML (A) 09/09/2013 06:16 PM    CULTURE  09/09/2013 06:16 PM     Performed at [facility] [address] [place] [id number] [phone]       Imaging:    US RENAL COMPLETE    Result Date: 3/30/2024  Unremarkable ultrasound of the kidneys and urinary bladder.     XR CHEST PORTABLE    Result Date: 3/29/2024  No acute cardiopulmonary process     MRI CERVICAL SPINE WO CONTRAST    Result Date: 3/27/2024  1. Moderate spinal canal stenosis and severe left neural foraminal narrowing at C6-7. 2. Mild spinal canal stenosis, severe left and moderate right neural foraminal narrowing at C5-6. 3.

## 2024-04-02 NOTE — PROGRESS NOTES
[facility]  Progress Note                   Date:   4/2/2024  Patient name: Casper Cummins  Date of admission:  3/29/2024 10:15 AM  MRN:   [id number]  YOB: 1950  PCP: Diane Bowie DO    Reason for Admission: Hyperkalemia [E87.5]  Elevated serum creatinine [R79.89]  Hypotension, unspecified hypotension type [I95.9]  Acute metabolic acidosis [E87.21]    Subjective:       Clinical Changes /Abnormalities:Patient seen and examined. Denies chest pain or shortness of breath. Tele/vitals/labs reviewed .     Review of Systems    Medications:   Scheduled Meds:   clopidogrel  75 mg Oral Daily    atorvastatin  80 mg Oral Nightly    sodium chloride flush  5-40 mL IntraVENous 2 times per day    heparin (porcine)  5,000 Units SubCUTAneous 3 times per day     Continuous Infusions:   sodium chloride 10 mL/hr at 03/30/24 1717     CBC:   Recent Labs     03/31/24  0228 03/31/24  0926 04/01/24  0844 04/02/24  0844   WBC 4.6  --  5.0 4.5   HGB 7.7* 8.8* 8.0* 7.9*   PLT 83*  --  85* 92*       BMP:    Recent Labs     03/31/24  1934 04/01/24  0844 04/01/24  1356    143 140   K 4.4 4.8 4.8    110* 106   CO2 24 26 26   BUN 42* 33* 30*   CREATININE 1.5* 1.4* 1.5*   GLUCOSE 101* 105* 126*       Hepatic:  No results for input(s): \"AST\", \"ALT\", \"ALB\", \"BILITOT\", \"ALKPHOS\" in the last 72 hours.    Troponin:   No results for input(s): \"TROPHS\" in the last 72 hours.    BNP: No results for input(s): \"BNP\" in the last 72 hours.  Lipids: No results for input(s): \"CHOL\", \"HDL\" in the last 72 hours.    Invalid input(s): \"LDLCALCU\"  INR:   No results for input(s): \"INR\" in the last 72 hours.      DATA:    Diagnostics:    1. Coronary artery disease with CABG in 1993 with LIMA-LAD, SVG-D1, SVG-OM1, SVG-OM2 and SVG-RCA.  2. Heart catheterization in 2000 suggested occluded SVG-OM1 and OM2 with other grafts patent.  3. Ischemic cardiomyopathy, ejection fraction reported about 45% before.  4.  edema  Neurologic: not done        Assessment / Acute Cardiac Problems:   Dizziness and hypotension admission  Hemodynamically instability  Chronic diarrhea  CAD with history of CABG 1993 [LIMA-LAD, SVG-D1, SVG-OM1, SVG-OM2 and SVG-RCA], left heart cath patent LIMA LAD, occluded vein grafts.  RCA  with collaterals, patent LCx and OM stents  Hypertension  Hyperlipidemia   Preserved LVEF on echo 2022 with diastolic dysfunction  HILARIO creatinine 4.1,  on admission  (baseline 26/1 ) and metabolic acidosis  Hyperkalemia on admission 6.6   smoking history  History of cervical stenosis    Patient Active Problem List:     S/P CABG x 4 1993     S/P coronary artery stent placement-Native LCX 2000 / Dr. pearce     Obesity     HTN (hypertension)     Hyperlipidemia     S/P LAURA - OM1 5/18/15-Dr. pearce     Angina pectoris, unspecified     Coronary artery disease involving native coronary artery of native heart without angina pectoris     Metabolic acidosis     Hypotension     HILARIO (acute kidney injury) (HCC)     Hyperkalemia     Anemia     Diastolic heart failure (HCC)     Chronic diarrhea      Plan of Treatment:   Elevated troponin likely secondary to demand ischemia. Echocardiogram ordered.   Stable - blood pressure has improved. On Midodrine with holding parameters  Continue statin and Plavix   HILARIO per nephrology   If echocardiogram low risk, no further CV workup at this time. Follow as OP in 2-3 weeks.     Electronically signed by ORAL White CNP on 4/2/2024 at 11:08 AM  [facility] [place].  [phone]

## 2024-04-02 NOTE — PLAN OF CARE
Problem: Discharge Planning  Goal: Discharge to home or other facility with appropriate resources  4/1/2024 2226 by Pola Slade RN  Outcome: Progressing  4/1/2024 1047 by Karen Kumar RN  Outcome: Progressing     Problem: Safety - Adult  Goal: Free from fall injury  4/1/2024 2226 by Pola Slade RN  Outcome: Progressing  4/1/2024 1047 by Karen Kumar RN  Outcome: Progressing     Problem: ABCDS Injury Assessment  Goal: Absence of physical injury  4/1/2024 2226 by Pola Slade RN  Outcome: Progressing  4/1/2024 1047 by Karen Kumar RN  Outcome: Progressing     Problem: Pain  Goal: Verbalizes/displays adequate comfort level or baseline comfort level  4/1/2024 2226 by Pola Slade RN  Outcome: Progressing  4/1/2024 1047 by Karen Kumar RN  Outcome: Progressing

## 2024-04-02 NOTE — PROGRESS NOTES
Renal Progress Note    Patient :  Casper Cummins; 73 y.o. MRN# [id number]  Location:  0443/0443-01  Attending:  Danny Starr MD  Admit Date:  3/29/2024   Hospital Day: 4    Subjective:     Following for HILARIO and hyperkalemia, admitted with symptomatic hypotension   Peak creatinine this admission was 4.1 mg/dl, potassium of 6.6.   Baseline creatinine 1.1-1.2 mg/dl.    /2/2024  Patient was seen and examined.  Patient was sitting comfortably.  He was alert and awake x 3.  His creatinine remains essentially unchanged and 1.5 mg/dL which is almost doubled from his baseline creatinine.  Patient denies any lightheadedness or dizziness.  His blood pressure remains under good control.      On midodrine 10 mg 3 times a day.    Renal ultrasound 3/29/2024: Right kidney measures 11.09 cm and left kidney measures 11.1 cm.   Impression: Unremarkable ultrasound of the kidneys and urinary bladder.  Outpatient Medications:     Medications Prior to Admission: [DISCONTINUED] lisinopril (PRINIVIL;ZESTRIL) 5 MG tablet, Take 1 tablet by mouth daily  bumetanide (BUMEX) 2 MG tablet, Take 1 tablet by mouth daily PT NEEDS TO CALL FOR AN APPOINTMENT FOR FURTHER REFILLS  [DISCONTINUED] clopidogrel (PLAVIX) 75 MG tablet, Take 1 tablet by mouth daily  [DISCONTINUED] metoprolol succinate (TOPROL XL) 25 MG extended release tablet, Take 1 tablet by mouth 2 times daily (Patient taking differently: Take 1 tablet by mouth daily)  simvastatin (ZOCOR) 80 MG tablet, Take 1 tablet by mouth nightly  [DISCONTINUED] potassium chloride (KLOR-CON M) 20 MEQ extended release tablet, Take 2 tablets by mouth daily for 7 days  tamsulosin (FLOMAX) 0.4 MG capsule, Take 1 capsule by mouth daily  [DISCONTINUED] isosorbide mononitrate (IMDUR) 30 MG CR tablet, Take 1 tablet by mouth daily    Current Medications:     Scheduled Meds:    clopidogrel  75 mg Oral Daily    atorvastatin  80 mg Oral Nightly    sodium chloride flush  5-40 mL IntraVENous 2 times per day       Component Value Date/Time    PROT 7.1 03/29/2024 10:42 AM     C3:     Lab Results   Component Value Date/Time    C3 119 03/29/2024 05:54 PM     C4:     Lab Results   Component Value Date/Time    C4 31 03/29/2024 05:54 PM     Urinalysis/Chemistries:      Lab Results   Component Value Date/Time    NITRU NEGATIVE 03/29/2024 12:12 PM    COLORU Yellow 03/29/2024 12:12 PM    PHUR 5.0 03/29/2024 12:12 PM    WBCUA 50  09/09/2013 06:17 PM    RBCUA 20 TO 50 09/09/2013 06:17 PM    MUCUS 2+ 09/09/2013 06:17 PM    TRICHOMONAS NOT REPORTED 09/09/2013 06:17 PM    YEAST NOT REPORTED 09/09/2013 06:17 PM    BACTERIA MANY 09/09/2013 06:17 PM    SPECGRAV 1.014 03/29/2024 12:12 PM    LEUKOCYTESUR NEGATIVE 03/29/2024 12:12 PM    UROBILINOGEN Normal 03/29/2024 12:12 PM    BILIRUBINUR NEGATIVE 03/29/2024 12:12 PM    GLUCOSEU NEGATIVE 03/29/2024 12:12 PM    KETUA TRACE 03/29/2024 12:12 PM    AMORPHOUS NOT REPORTED 09/09/2013 06:17 PM     Urine Sodium:     Lab Results   Component Value Date/Time    NORBERTO 34 03/29/2024 12:12 PM     Urine Potassium:    Lab Results   Component Value Date/Time    KUR 36.2 03/29/2024 12:12 PM     Urine Osmolarity:   Lab Results   Component Value Date/Time    OSMOU 360 03/29/2024 12:12 PM      Urine Creatinine:     Lab Results   Component Value Date/Time    LABCREA 209.0 03/29/2024 12:12 PM     Radiology:     Reviewed.     Assessment:     Acute Kidney Injury likely due to prerenal factors along with ATN from hypotension requiring pressor support. Peak creatinine this admission was 4.1 mg/dl.  Baseline creatinine seems to be around 1.1-1.2 mg/dl, creatinine is essentially unchanged from last 3 days.  And 1.5 mg/dL.    Hyperkalemia: Resolved   hypotension requiring pressor support: Improved  Metabolic acidosis.  Anemia of chronic disease.  Coronary artery disease with history of CABG.  History of cervical stenosis.    Plan:   No changes in medication  BMP 5 days post discharge and fax results to our office,

## 2024-04-02 NOTE — DISCHARGE SUMMARY
[facility]     Department of Internal Medicine - Staff Internal Medicine Teaching Service    INPATIENT DISCHARGE SUMMARY      Patient Identification:  Casper Cummins is a 73 y.o. male.  :  1950  MRN: [id number]     Acct: [id number]   PCP: Diane Bowie DO  Admit Date:  3/29/2024  Discharge date and time: No discharge date for patient encounter.   Attending Provider: Danny Starr MD                                     ACTIVE DISCHARGE DIAGNOSES     Hospital Problem Lists:  Principal Problem:    Metabolic acidosis  Active Problems:    S/P CABG x 4     Hyperlipidemia    Coronary artery disease involving native coronary artery of native heart without angina pectoris    Hypotension    HILARIO (acute kidney injury) (HCC)    Hyperkalemia    Anemia    Diastolic heart failure (HCC)    Chronic diarrhea  Resolved Problems:    * No resolved hospital problems. *      HOSPITAL STAY     Brief Inpatient course:   Casper Cummins is a 73 y.o. male who was admitted for the management of Metabolic acidosis, presented to the emergency department with the chief complaint of dizziness.  On 3/29/2024, he was following up with neurosurgery for his chronic neck pain.  He was found to have low blood pressure.  He was sent to the ED from that office.    In the ED, he was hypotensive and had an HILARIO with creatinine 4.4.  Initial labs was concerning for known anion gap metabolic acidosis and hyperkalemia with potassium 6.6.  He received calcium, insulin, dextrose and started on discharge bicarb drip for non-anion gap metabolic acidosis in the ED.  Initially, he was requiring Levophed for hypotension and hence he was transferred to the MICU for further management.    In the MICU, his creatinine improved on the bicarbonate drip.  Nephrology was following the patient.  His antihypertensives and diuretics were held.  Levophed was weaned off and he was started on scheduled midodrine 10 mg 3 times  tablet Comments:   Reason for Stopping:               Activity: activity as tolerated    Diet: regular diet    Follow-up:    Donald Flores MD  [address], [address]  [facility] [address]  [phone]    Schedule an appointment as soon as possible for a visit in 3 week(s)      Diane Bowie,   [address]  [place] [facility] [address]  [phone]    Follow up      Diane Bowie DO  [address]  [place] [address]  [phone]            Patient Instructions: You were seen here for hypertension, dehydration, acute kidney injury.    -Monitor your blood pressures at home.  -Start midodrine 5 mg 3 times daily for hypotension if your systolic blood pressure is less than 90.  -You improved after IV fluids.  -Your blood pressure medications have been discontinued.  -Please resume your Bumex 4 days after discharge.  -Please repeat a BMP after 1 week.  -Please follow-up with nephrology in 3 to 4 weeks.  -Please follow-up with your PCP for your medication reconciliation.  -Return to ER/call 911 if you begin to feel worsening of your symptoms.    Follow up labs: BMP in one week  Follow up imaging: None    Note that over 30 minutes was spent in preparing discharge papers, discussing discharge with patient, medication review, etc.      Ulices Amaya MD,   Internal Medicine Resident, PGY-1  [facility],  [place], [place].  4/2/2024, 2:25 PM

## 2024-04-02 NOTE — DISCHARGE INSTRUCTIONS
You were seen here for hypertension, dehydration, acute kidney injury.    -Monitor your blood pressures at home.  -Start midodrine 5 mg 3 times daily for hypotension if your systolic blood pressure is less than 90.  -You improved after IV fluids.  -Your blood pressure medications have been discontinued.  -Please resume your Bumex 4 days after discharge.  -Please repeat a BMP after 1 week.  -Please follow-up with nephrology in 3 to 4 weeks.  -Please follow-up with your PCP for your medication reconciliation.  -Return to ER/call 911 if you begin to feel worsening of your symptoms.

## 2024-04-03 ENCOUNTER — ENROLLMENT (OUTPATIENT)
Dept: CASE MANAGEMENT | Age: 74
End: 2024-04-03

## 2024-04-03 ENCOUNTER — CARE COORDINATION (OUTPATIENT)
Dept: CASE MANAGEMENT | Age: 74
End: 2024-04-03

## 2024-04-03 NOTE — CARE COORDINATION
Care Transitions Outreach Attempt    Call within 2 business days of discharge: Yes   Attempted to reach patient for transitions of care follow up. Unable to reach patient.    Patient: Casper Cummins Patient : 1950 MRN: <[id number]>    Last Discharge Facility       Date Complaint Diagnosis Description Type Department Provider    3/29/24 Dizziness Hypotension, unspecified hypotension type ... ED to Hosp-Admission (Discharged) (ADMITTED) [facility] Danny Starr MD; Gera Walsh ...          # 1 attempt-Attempted initial 24 hour hospital follow up call. Left a Hipaa compliant message with name and call back information. Requested return call to [phone].     Was this an external facility discharge? No Discharge Facility Name: [facility]    Noted following upcoming appointments from discharge chart review:   [facility] follow up appointment(s):   Future Appointments   Date Time Provider Department Center   2024 11:30 AM Lobo Zacarias DO [facility]   2024 12:00 PM Diane Bowie DO [facility]   2024 10:30 AM SCHEDULE, [facility] [place] [facility]   2024  9:00 AM Diaen Boiwe DO [facility]   10/2/2024  9:30 AM Markell Saravia DO [facility]     Non-[facility]  follow up appointment(s):

## 2024-04-04 ENCOUNTER — CARE COORDINATION (OUTPATIENT)
Dept: CASE MANAGEMENT | Age: 74
End: 2024-04-04

## 2024-04-04 DIAGNOSIS — I50.33 ACUTE ON CHRONIC DIASTOLIC HEART FAILURE (HCC): Primary | ICD-10-CM

## 2024-04-04 PROCEDURE — 1111F DSCHRG MED/CURRENT MED MERGE: CPT

## 2024-04-04 NOTE — CARE COORDINATION
Care Transitions Initial Follow Up Call    Call within 2 business days of discharge: Yes    Patient Current Location:  Home: [address] [place] [address]    Care Transition Nurse contacted the patient by telephone to perform post hospital discharge assessment. Verified name and  with patient as identifiers. Provided introduction to self, and explanation of the Care Transition Nurse role.     Patient: Casper Cummins Patient : 1950   MRN: <[id number]>  Reason for Admission:   Discharge Date: 24 RARS: Readmission Risk Score: 14      Last Discharge Facility       Date Complaint Diagnosis Description Type Department Provider    3/29/24 Dizziness Hypotension, unspecified hypotension type ... ED to Hosp-Admission (Discharged) (ADMITTED) [facility] Danny Starr MD; Gera Walsh ...            Was this an external facility discharge? No Discharge Facility: [facility]    Challenges to be reviewed by the provider   Additional needs identified to be addressed with provider: No  none               Method of communication with provider: none.    Writer spoke to patient, he is doing well, reviewed discharge instructions and medications, 1111F order completed, denied any c/o fever, chills, n/v/d, sob or cp, reviewed CHF s/s/tx, discussed rpm patient declined, declined writer;s offer to assist in making HFU appt with pcp, stated he already had call out to office, appetite has been good B/B wnl, explained role of cTN provided contact information, will follow//JU    Care Transition Nurse reviewed discharge instructions, medical action plan, and red flags with patient who verbalized understanding. The patient was given an opportunity to ask questions and does not have any further questions or concerns at this time. Were discharge instructions available to patient? Yes. Reviewed appropriate site of care based on symptoms and resources available to patient including: PCP  Specialist  Urgent care clinics  When to

## 2024-04-09 ENCOUNTER — CARE COORDINATION (OUTPATIENT)
Dept: CASE MANAGEMENT | Age: 74
End: 2024-04-09

## 2024-04-09 NOTE — CARE COORDINATION
Care Transitions Follow Up Call    Patient Current Location:  Home: [address] [place] [address] Care Coordinator contacted the patient by telephone to follow up after admission on 3/29/24.  Verified name and  with patient as identifiers.    Patient: Casper Cummins  Patient : 1950   MRN: [id number]  Reason for Admission: Acute metabolic acidosis  Discharge Date: 24 RARS: Readmission Risk Score: 14      Needs to be reviewed by the provider   Additional needs identified to be addressed with provider: No  none             Method of communication with provider: none.    Subsequent transitional call. Spoke to Casper he reports he doing good. He denies HA, dizziness, cp .sob , swelling. BP has been around 110/70. He is watching sodium in diet. Writer discussed daily weights, how to when to report weight gain/keeping log. He stated that he would give it a try. He declined assistance with scheduling pcp HFU. Stated he will call and that he wanted to get all other calls out the way.  Next appointment 24 neurosurgery.    Addressed changes since last contact:   discussed BP/ HFU/ daily weights   Discussed follow-up appointments. If no appointment was previously scheduled, appointment scheduling offered: Yes.   Is follow up appointment scheduled within 7 days of discharge? Declined assistance with scheduling .    Follow Up  Future Appointments   Date Time Provider Department Center   2024 11:30 AM Lobo Zacarias DO [facility]   2024 12:00 PM Diane Bowie DO [facility]   2024  9:00 AM Diane Bowie DO [facility]   10/2/2024  9:30 AM Markell Saravia DO [facility]          reviewed  with  and discussed any barriers to care and/or understanding of plan of care after discharge. Discussed appropriate site of care based on symptoms and resources available to patient including: . The  agrees to contact the PCP office for questions related to

## 2024-04-17 ENCOUNTER — CARE COORDINATION (OUTPATIENT)
Dept: CASE MANAGEMENT | Age: 74
End: 2024-04-17

## 2024-04-17 ENCOUNTER — OFFICE VISIT (OUTPATIENT)
Dept: NEUROSURGERY | Age: 74
End: 2024-04-17
Payer: MEDICARE

## 2024-04-17 ENCOUNTER — HOSPITAL ENCOUNTER (OUTPATIENT)
Dept: GENERAL RADIOLOGY | Age: 74
Discharge: HOME | End: 2024-04-19
Payer: MEDICARE

## 2024-04-17 ENCOUNTER — HOSPITAL ENCOUNTER (OUTPATIENT)
Age: 74
Discharge: HOME | End: 2024-04-19
Payer: MEDICARE

## 2024-04-17 VITALS
BODY MASS INDEX: 31.21 KG/M2 | HEART RATE: 80 BPM | DIASTOLIC BLOOD PRESSURE: 77 MMHG | WEIGHT: 218 LBS | SYSTOLIC BLOOD PRESSURE: 112 MMHG | HEIGHT: 70 IN

## 2024-04-17 DIAGNOSIS — M48.02 STENOSIS OF CERVICAL SPINE WITH MYELOPATHY (HCC): Primary | ICD-10-CM

## 2024-04-17 DIAGNOSIS — G99.2 STENOSIS OF CERVICAL SPINE WITH MYELOPATHY (HCC): Primary | ICD-10-CM

## 2024-04-17 DIAGNOSIS — G95.9 CERVICAL MYELOPATHY WITH CERVICAL RADICULOPATHY (HCC): ICD-10-CM

## 2024-04-17 DIAGNOSIS — M54.12 CERVICAL MYELOPATHY WITH CERVICAL RADICULOPATHY (HCC): ICD-10-CM

## 2024-04-17 DIAGNOSIS — M48.02 CERVICAL SPINAL STENOSIS: ICD-10-CM

## 2024-04-17 PROCEDURE — 3074F SYST BP LT 130 MM HG: CPT | Performed by: NEUROLOGICAL SURGERY

## 2024-04-17 PROCEDURE — 3078F DIAST BP <80 MM HG: CPT | Performed by: NEUROLOGICAL SURGERY

## 2024-04-17 PROCEDURE — 1123F ACP DISCUSS/DSCN MKR DOCD: CPT | Performed by: NEUROLOGICAL SURGERY

## 2024-04-17 PROCEDURE — 99213 OFFICE O/P EST LOW 20 MIN: CPT | Performed by: NEUROLOGICAL SURGERY

## 2024-04-17 PROCEDURE — 72050 X-RAY EXAM NECK SPINE 4/5VWS: CPT

## 2024-04-17 NOTE — CARE COORDINATION
Care Transitions Outreach Attempt    Call within 2 business days of discharge: Yes   Attempted to reach patient for transitions of care follow up. Unable to reach patient.    Patient: Casper Cummins Patient : 1950 MRN: <[id number]>    Last Discharge Facility       Date Complaint Diagnosis Description Type Department Provider    3/29/24 Dizziness Hypotension, unspecified hypotension type ... ED to Hosp-Admission (Discharged) (ADMITTED) [facility] Danny Starr MD; Gera Walsh ...          # 1 attempt-Attempted to reach patient for subsequent call. Left Hipaa appropriate message with contact information requesting return call to [phone]     Was this an external facility discharge? No Discharge Facility Name: [facility]    Noted following upcoming appointments from discharge chart review:   [facility] follow up appointment(s):   Future Appointments   Date Time Provider Department Center   2024 12:00 PM Diane Bowie DO [facility]   2024  9:00 AM Diane Bowie DO [facility]   2024  9:00 AM Lobo Zacarias DO [facility]   10/2/2024  9:30 AM Markell Saravia DO [facility]     Non-[facility]  follow up appointment(s):

## 2024-04-17 NOTE — PROGRESS NOTES
[facility] NEUROSURGERY [place] [id number] [facility]  [id number]  [id number] - GROUND FLOOR, [address]  [facility] [address]  Dept: [phone]    Patient:  Casper Cummins  YOB: 1950  Date: 4/17/24    The patient is a 73 y.o. male who presents today for consult of the following problems:     Chief Complaint   Patient presents with    Neck Pain             HPI:     Casper Cummins is a 73 y.o. male on whom neurosurgical consultation was requested by Diane Bowie DO for management of severe cervical stenosis.  Patient originally was seen for neck pain and is having some numbness of the left hand that has been persistent.  Did have an MRI completed showing significant central stenosis.  Was seen by my nurse practitioner last office visit but unfortunately had very symptomatic hypotension and was triaged to the emergency room worked up and admitted.  As of right now he denies anything outside of some baseline numbness of his left index and thumb.  Otherwise no distinct numbness dexterity issues bowel bladder incontinence or ataxia of any kind..      History:     Past Medical History:   Diagnosis Date    Arthritis     Coronary artery disease (CAD) excluded     CABG x 4 in 1993, 5 stents after between 2000 and 2005    Enlarged prostate     Hyperlipidemia     Hypertension     Obesity     Positive cardiac stress test 2021    SOB (shortness of breath)     Wears partial dentures     upper     Past Surgical History:   Procedure Laterality Date    CARDIAC CATHETERIZATION  03/01/2021    CATARACT REMOVAL      COLONOSCOPY  3/12/2019    COLONOSCOPY CONTROL HEMORRHAGE performed by Donlad Milton MD at [facility]    COLONOSCOPY  3/12/2019    COLONOSCOPY POLYPECTOMY HOT BIOPSY performed by Donald Milton MD at [facility]    COLONOSCOPY N/A 10/20/2020    COLONOSCOPY WITH BIOPSY performed by Donald Milton MD at [facility]

## 2024-04-18 ENCOUNTER — HOSPITAL ENCOUNTER (OUTPATIENT)
Age: 74
Setting detail: SPECIMEN
Discharge: HOME | End: 2024-04-18

## 2024-04-18 LAB
ANION GAP SERPL CALCULATED.3IONS-SCNC: 13 MMOL/L (ref 9–16)
BNP SERPL-MCNC: 486 PG/ML (ref 0–300)
BUN SERPL-MCNC: 21 MG/DL (ref 8–23)
CALCIUM SERPL-MCNC: 9 MG/DL (ref 8.6–10.4)
CHLORIDE SERPL-SCNC: 104 MMOL/L (ref 98–107)
CO2 SERPL-SCNC: 25 MMOL/L (ref 20–31)
CREAT SERPL-MCNC: 1.3 MG/DL (ref 0.7–1.2)
GFR SERPL CREATININE-BSD FRML MDRD: 60 ML/MIN/1.73M2
GLUCOSE SERPL-MCNC: 121 MG/DL (ref 74–99)
POTASSIUM SERPL-SCNC: 3.5 MMOL/L (ref 3.7–5.3)
SODIUM SERPL-SCNC: 142 MMOL/L (ref 136–145)

## 2024-04-19 ENCOUNTER — CARE COORDINATION (OUTPATIENT)
Dept: CASE MANAGEMENT | Age: 74
End: 2024-04-19

## 2024-04-19 NOTE — CARE COORDINATION
Care Transitions Note    Follow Up Call      Patient Current Location:  Home: [address] [place], [place] [id number]    Care Transition Nurse contacted the patient by telephone. Verified name and  as identifiers.    Additional needs identified to be addressed with provider   No needs identified                 Method of communication with provider: none.    Care Summary Note: Writer spoke to patient, he is doing well, stated he has been feeling pretty good, BP's have been wnl, had f/u with neuro, they are just going to be keeping an eye on the neck pain, the MRI showed showed some  cervical stenosis still has some numbness to left finger/ thumb, denied any c/o fever, chills, n/v/d, sob or chest pain at time of call, B/B wnl, encouraged to a call with any needs, will follow//JU    Addressed changes since last contact:  Review of patient management of conditions/medications:         Advance Care Planning:   Does patient have an Advance Directive: reviewed during previous call, see note. .    Medication Review:  No changes since last call.     Remote Patient Monitoring:  Offered patient enrollment in the Remote Patient Monitoring (RPM) program for in-home monitoring: Patient declined.    Assessments:   Goals Addressed    None          Follow Up Appointment:   Reviewed upcoming appointment(s).  Future Appointments         Provider Specialty Dept Phone    2024 12:00 PM Diane Bowie DO Primary Care [phone]    2024 9:00 AM Diane Bowie DO Primary Care [phone]    2024 9:00 AM Lobo Zacarias DO Neurosurgery [phone]    10/2/2024 9:30 AM Markell Saravia DO Cardiology [phone]                Patient has agreed to contact primary care provider and/or specialist for any further questions, concerns, or needs.    Maria Dolores Sherwood RN

## 2024-04-25 ENCOUNTER — CARE COORDINATION (OUTPATIENT)
Dept: CASE MANAGEMENT | Age: 74
End: 2024-04-25

## 2024-04-25 NOTE — CARE COORDINATION
Care Transitions Outreach Attempt # 1    Call within 2 business days of discharge: Yes   Attempted to reach patient for transitions of care follow up. Unable to reach patient.Left HIPAA complaint VM requesting a return call.     Patient: Casper Cummins Patient : 1950 MRN: [id number]    Last Discharge Facility       Date Complaint Diagnosis Description Type Department Provider    3/29/24 Dizziness Hypotension, unspecified hypotension type ... ED to Hosp-Admission (Discharged) (ADMITTED) [facility] 4C ONC Danny Starr MD; Gera Walsh ...              Was this an external facility discharge? No Discharge Facility Name: [facility]      Future Appointments   Date Time Provider Department Center   2024 12:00 PM Diane Bowie DO [facility]   2024  9:00 AM Diane Bowie DO [facility]   2024  9:00 AM Lobo Zacarias DO [facility]   10/2/2024  9:30 AM Markell Saravia DO [facility]

## 2024-04-26 ENCOUNTER — CARE COORDINATION (OUTPATIENT)
Dept: CASE MANAGEMENT | Age: 74
End: 2024-04-26

## 2024-04-26 NOTE — CARE COORDINATION
Care Transitions Follow Up Call    Patient Current Location:  Home: [address] [place] [address] Care Coordinator contacted the patient by telephone to follow up after admission on 2024.  Verified name and  with patient as identifiers.    Patient: Casper Cummins  Patient : 1950   MRN: [id number]  Reason for Admission:  Acute metabolic acidosis   Discharge Date: 24 RARS: Readmission Risk Score: 14      Needs to be reviewed by the provider   Additional needs identified to be addressed with provider: No  none             Method of communication with provider: none.    Writer spoke with Casper for his final care transitions call. He states he is feeling good and has no new issues or concerns. Stated BP in WNL. Denies lightheadedness,no SOB or chest pain. He has his nephrology follow up on Monday. He denies having any new needs or concerns, No new needs identified-will end care transitions.       Addressed changes since last contact:  none  Discussed follow-up appointments. If no appointment was previously scheduled, appointment scheduling offered: Yes.   Is follow up appointment scheduled within 7 days of discharge? No.    Follow Up  Future Appointments   Date Time Provider Department Center   2024 12:50 PM Donald Flores MD [facility] None   2024 12:00 PM Diane Bowie DO [facility]   2024  9:00 AM Diane Bowie DO [facility]   2024  9:00 AM Lobo Zacarias DO [facility]     External follow up appointment(s): N/A    LPN Care Coordinator reviewed red flags with patient and discussed any barriers to care and/or understanding of plan of care after discharge. Discussed appropriate site of care based on symptoms and resources available to patient including: PCP  Specialist  [employer]. The patient agrees to contact the PCP office for questions related to their healthcare.     Advance Care Planning:   not on file.     Patients top

## 2024-05-03 ENCOUNTER — ANESTHESIA EVENT (OUTPATIENT)
Dept: OPERATING ROOM | Age: 74
End: 2024-05-03
Payer: MEDICARE

## 2024-05-03 ENCOUNTER — HOSPITAL ENCOUNTER (OUTPATIENT)
Age: 74
Setting detail: OUTPATIENT SURGERY
Discharge: HOME | End: 2024-05-03
Attending: INTERNAL MEDICINE | Admitting: INTERNAL MEDICINE
Payer: MEDICARE

## 2024-05-03 ENCOUNTER — ANESTHESIA (OUTPATIENT)
Dept: OPERATING ROOM | Age: 74
End: 2024-05-03
Payer: MEDICARE

## 2024-05-03 VITALS
RESPIRATION RATE: 16 BRPM | HEIGHT: 70 IN | WEIGHT: 219 LBS | DIASTOLIC BLOOD PRESSURE: 69 MMHG | OXYGEN SATURATION: 98 % | HEART RATE: 57 BPM | TEMPERATURE: 97.5 F | BODY MASS INDEX: 31.35 KG/M2 | SYSTOLIC BLOOD PRESSURE: 125 MMHG

## 2024-05-03 DIAGNOSIS — Z12.11 SCREENING FOR COLON CANCER: ICD-10-CM

## 2024-05-03 PROCEDURE — 88305 TISSUE EXAM BY PATHOLOGIST: CPT

## 2024-05-03 PROCEDURE — 2500000003 HC RX 250 WO HCPCS: Performed by: NURSE ANESTHETIST, CERTIFIED REGISTERED

## 2024-05-03 PROCEDURE — 7100000011 HC PHASE II RECOVERY - ADDTL 15 MIN: Performed by: INTERNAL MEDICINE

## 2024-05-03 PROCEDURE — 3609010600 HC COLONOSCOPY POLYPECTOMY SNARE/COLD BIOPSY: Performed by: INTERNAL MEDICINE

## 2024-05-03 PROCEDURE — 6360000002 HC RX W HCPCS: Performed by: NURSE ANESTHETIST, CERTIFIED REGISTERED

## 2024-05-03 PROCEDURE — 3700000000 HC ANESTHESIA ATTENDED CARE: Performed by: INTERNAL MEDICINE

## 2024-05-03 PROCEDURE — 7100000010 HC PHASE II RECOVERY - FIRST 15 MIN: Performed by: INTERNAL MEDICINE

## 2024-05-03 PROCEDURE — 2709999900 HC NON-CHARGEABLE SUPPLY: Performed by: INTERNAL MEDICINE

## 2024-05-03 PROCEDURE — 2580000003 HC RX 258: Performed by: ANESTHESIOLOGY

## 2024-05-03 PROCEDURE — 3700000001 HC ADD 15 MINUTES (ANESTHESIA): Performed by: INTERNAL MEDICINE

## 2024-05-03 RX ORDER — NALOXONE HYDROCHLORIDE 0.4 MG/ML
INJECTION, SOLUTION INTRAMUSCULAR; INTRAVENOUS; SUBCUTANEOUS PRN
Status: DISCONTINUED | OUTPATIENT
Start: 2024-05-03 | End: 2024-05-03 | Stop reason: HOSPADM

## 2024-05-03 RX ORDER — SODIUM CHLORIDE, SODIUM LACTATE, POTASSIUM CHLORIDE, CALCIUM CHLORIDE 600; 310; 30; 20 MG/100ML; MG/100ML; MG/100ML; MG/100ML
INJECTION, SOLUTION INTRAVENOUS CONTINUOUS
Status: DISCONTINUED | OUTPATIENT
Start: 2024-05-03 | End: 2024-05-03 | Stop reason: HOSPADM

## 2024-05-03 RX ORDER — PROPOFOL 10 MG/ML
INJECTION, EMULSION INTRAVENOUS CONTINUOUS PRN
Status: DISCONTINUED | OUTPATIENT
Start: 2024-05-03 | End: 2024-05-03 | Stop reason: SDUPTHER

## 2024-05-03 RX ORDER — SODIUM CHLORIDE 0.9 % (FLUSH) 0.9 %
5-40 SYRINGE (ML) INJECTION PRN
Status: DISCONTINUED | OUTPATIENT
Start: 2024-05-03 | End: 2024-05-03 | Stop reason: HOSPADM

## 2024-05-03 RX ORDER — HYDROMORPHONE HYDROCHLORIDE 1 MG/ML
0.5 INJECTION, SOLUTION INTRAMUSCULAR; INTRAVENOUS; SUBCUTANEOUS EVERY 5 MIN PRN
Status: DISCONTINUED | OUTPATIENT
Start: 2024-05-03 | End: 2024-05-03 | Stop reason: HOSPADM

## 2024-05-03 RX ORDER — SODIUM CHLORIDE 9 MG/ML
INJECTION, SOLUTION INTRAVENOUS PRN
Status: DISCONTINUED | OUTPATIENT
Start: 2024-05-03 | End: 2024-05-03 | Stop reason: HOSPADM

## 2024-05-03 RX ORDER — LIDOCAINE HYDROCHLORIDE 20 MG/ML
INJECTION, SOLUTION EPIDURAL; INFILTRATION; INTRACAUDAL; PERINEURAL PRN
Status: DISCONTINUED | OUTPATIENT
Start: 2024-05-03 | End: 2024-05-03 | Stop reason: SDUPTHER

## 2024-05-03 RX ORDER — FENTANYL CITRATE 50 UG/ML
25 INJECTION, SOLUTION INTRAMUSCULAR; INTRAVENOUS EVERY 5 MIN PRN
Status: DISCONTINUED | OUTPATIENT
Start: 2024-05-03 | End: 2024-05-03 | Stop reason: HOSPADM

## 2024-05-03 RX ORDER — SODIUM CHLORIDE 0.9 % (FLUSH) 0.9 %
5-40 SYRINGE (ML) INJECTION EVERY 12 HOURS SCHEDULED
Status: DISCONTINUED | OUTPATIENT
Start: 2024-05-03 | End: 2024-05-03 | Stop reason: HOSPADM

## 2024-05-03 RX ORDER — ONDANSETRON 2 MG/ML
4 INJECTION INTRAMUSCULAR; INTRAVENOUS
Status: DISCONTINUED | OUTPATIENT
Start: 2024-05-03 | End: 2024-05-03 | Stop reason: HOSPADM

## 2024-05-03 RX ORDER — SODIUM CHLORIDE 9 MG/ML
INJECTION, SOLUTION INTRAVENOUS CONTINUOUS
Status: DISCONTINUED | OUTPATIENT
Start: 2024-05-03 | End: 2024-05-03 | Stop reason: HOSPADM

## 2024-05-03 RX ORDER — LIDOCAINE HYDROCHLORIDE 10 MG/ML
1 INJECTION, SOLUTION EPIDURAL; INFILTRATION; INTRACAUDAL; PERINEURAL
Status: DISCONTINUED | OUTPATIENT
Start: 2024-05-04 | End: 2024-05-03 | Stop reason: HOSPADM

## 2024-05-03 RX ADMIN — LIDOCAINE HYDROCHLORIDE 60 MG: 20 INJECTION, SOLUTION EPIDURAL; INFILTRATION; INTRACAUDAL; PERINEURAL at 10:19

## 2024-05-03 RX ADMIN — SODIUM CHLORIDE, POTASSIUM CHLORIDE, SODIUM LACTATE AND CALCIUM CHLORIDE: 600; 310; 30; 20 INJECTION, SOLUTION INTRAVENOUS at 08:24

## 2024-05-03 RX ADMIN — PROPOFOL 120 MCG/KG/MIN: 10 INJECTION, EMULSION INTRAVENOUS at 10:19

## 2024-05-03 ASSESSMENT — ENCOUNTER SYMPTOMS
SHORTNESS OF BREATH: 0
STRIDOR: 0

## 2024-05-03 ASSESSMENT — PAIN - FUNCTIONAL ASSESSMENT
PAIN_FUNCTIONAL_ASSESSMENT: FACE, LEGS, ACTIVITY, CRY, AND CONSOLABILITY (FLACC)
PAIN_FUNCTIONAL_ASSESSMENT: 0-10
PAIN_FUNCTIONAL_ASSESSMENT: PREVENTS OR INTERFERES SOME ACTIVE ACTIVITIES AND ADLS

## 2024-05-03 ASSESSMENT — LIFESTYLE VARIABLES: SMOKING_STATUS: 1

## 2024-05-03 ASSESSMENT — PAIN DESCRIPTION - DESCRIPTORS: DESCRIPTORS: ACHING

## 2024-05-03 NOTE — OP NOTE
Operative Note      Patient: Casper Cummins  YOB: 1950  MRN: [id number]    Date of Procedure: 5/3/2024    Pre-Op Diagnosis Codes:     * Screening for colon cancer [Z12.11]         History of polyp    Post-Op Diagnosis:  Transverse and descending colon polyps, internal hemorrhoids.       Procedure(s):  colonoscopy, POLYPECTOMIES    Surgeon(s):  Donald Milton MD    Assistant:   * No surgical staff found *    Anesthesia: Monitor Anesthesia Care    Estimated Blood Loss (mL): Minimal    Complications: None    Specimens:   ID Type Source Tests Collected by Time Destination   A : TRANSVERSE  COLON POLYP Tissue Colon-Transverse SURGICAL PATHOLOGY Donald Milton MD 5/3/2024 1024    B : DESCENDING COLON POLYP Tissue Colon-Descending SURGICAL PATHOLOGY Donald Milton MD 5/3/2024 1035        Implants:  * No implants in log *      Drains: * No LDAs found *    Findings:  Infection Present At Time Of Surgery (PATOS) (choose all levels that have infection present):  No infection present          Specimen collected: Yes    Scope withdrawal time: 14 minutes    Description of Procedure:  Informed consent was obtained from the patient after explanation of the procedure including indications, description of the procedure,  benefits and possible risks and complications of the procedure, and alternatives. Questions were answered.  The patient's history was reviewed and a directed physical examination was performed prior to the procedure.    Patient was monitored throughout the procedure with pulse oximetry and periodic assessment of vital signs. Patient was sedated as noted above. With the patient initially in the left lateral decubitus position, a digital rectal examination was performed and revealed negative without mass, lesions or tenderness.  The Olympus video colonoscope was placed in the patient's rectum and advanced without difficulty  to the cecum, which was identified by the ileocecal  valve and appendiceal orifice.  The prep was fair.  Examination of the mucosa was performed during both introduction and withdrawal of the colonoscope. Retroflexed view of the rectum was performed.  The patient  was taken to the recovery area in good condition.     Findings:     1.  3 sessile polyps were seen in the transverse colon.  Polyps in size from 7 to 5 mm.  Cold snare and cold forcep polypectomies were done.  Resection retrieval was complete  2.  6 mm sessile polyp was seen in the descending colon.  Cold snare polypectomy was done.  Resection retrieval was complete.  3.  Internal hemorrhoids were seen to retroflexion view     Recommendations: Repeat colonoscopy in 1 year with 2-day prep                                     Follow-up with PCP    Donald Milton MD    Electronically signed by Donald Milton MD on 5/3/2024 at 10:42 AM

## 2024-05-03 NOTE — ANESTHESIA PRE PROCEDURE
Types: Cigarettes     Start date:      Quit date: 2018     Years since quittin.3    Smokeless tobacco: Never    Tobacco comments:     one pack lasts a week and a half   Substance Use Topics    Alcohol use: Yes     Comment: couple drinks a day, liquor                                Counseling given: Not Answered  Tobacco comments: one pack lasts a week and a half      Vital Signs (Current):   Vitals:    24 0806 24 0813   BP: (!) 152/77    Pulse: 63    Resp: 16    Temp: 96.8 °F (36 °C)    SpO2: 98%    Weight:  99.3 kg (219 lb)   Height:  1.778 m (5' 10\")                                              BP Readings from Last 3 Encounters:   24 (!) 152/77   24 112/77   24 122/63       NPO Status: Time of last liquid consumption:                         Time of last solid consumption:                         Date of last liquid consumption: 24                        Date of last solid food consumption: 24    BMI:   Wt Readings from Last 3 Encounters:   24 99.3 kg (219 lb)   24 98.9 kg (218 lb)   24 100.2 kg (221 lb)     Body mass index is 31.42 kg/m².    CBC:   Lab Results   Component Value Date/Time    WBC 4.5 2024 08:44 AM    RBC 2.38 2024 08:44 AM    HGB 7.9 2024 08:44 AM    HCT 25.1 2024 08:44 AM    .5 2024 08:44 AM    RDW 14.6 2024 08:44 AM    PLT 92 2024 08:44 AM       CMP:   Lab Results   Component Value Date/Time     2024 11:47 AM    K 3.5 2024 11:47 AM     2024 11:47 AM    CO2 25 2024 11:47 AM    BUN 21 2024 11:47 AM    CREATININE 1.3 2024 11:47 AM    GFRAA >60 2021 03:27 PM    LABGLOM 60 2024 11:47 AM    GLUCOSE 121 2024 11:47 AM    CALCIUM 9.0 2024 11:47 AM    BILITOT 0.3 2024 10:42 AM    ALKPHOS 78 2024 10:42 AM    AST 17 2024 10:42 AM    ALT 19 2024 10:42 AM       POC Tests: No results for

## 2024-05-03 NOTE — ANESTHESIA POSTPROCEDURE EVALUATION
Department of Anesthesiology  Postprocedure Note    Patient: Casper Cummins  MRN: [id number]  YOB: 1950  Date of evaluation: 5/3/2024    Procedure Summary       Date: 05/03/24 Room / Location: [address]    Anesthesia Start: 1016 Anesthesia Stop: 1049    Procedure: colonoscopy, POLYPECTOMIES Diagnosis:       Screening for colon cancer      (Screening for colon cancer [Z12.11])    Surgeons: Donald Milton MD Responsible Provider: Abby Craft MD    Anesthesia Type: general, MAC, TIVA ASA Status: 4            Anesthesia Type: No value filed.    Barbara Phase I: Barbara Score: 10    Barbara Phase II: Barbara Score: 10    Anesthesia Post Evaluation    Patient location during evaluation: PACU  Patient participation: complete - patient participated  Level of consciousness: awake  Airway patency: patent  Nausea & Vomiting: no nausea  Cardiovascular status: blood pressure returned to baseline  Respiratory status: acceptable  Hydration status: euvolemic  Comments: Multimodal analgesia pain management as indicated by procedure  Multimodal analgesia pain management approach  Pain management: adequate    No notable events documented.

## 2024-05-03 NOTE — DISCHARGE INSTRUCTIONS
[facility]    POST-ENDOSCOPY INSTRUCTIONS    1. ACTIVITY   No driving, operating machinery, or making important decisions for 24 hours.    Resume normal activity after 24 hours.  You may return to work after 24 hours.    2. DIET        Resume your usual diet unless specified below.   ***    3. MEDICATIONS    Resume your usual medications.     Do not consume alcohol, tranquilizers, or sleeping medications for 24 hour unless advised by your physician.                 4. PHYSICIAN FOLLOW-UP / INSTRUCTIONS    Please call the office/clinic in 10 days for biopsy results:      [  ] GI office:  [phone]          Follow up with your family physician as planned.    6. NORMAL CHANGES YOU MAY EXPERIENCE AFTER ENDOSCOPY:         COLONOSCOPY      Passing of gas for several hours.       Some mild abdominal cramping.                  You may feel fatigued for the next 24-48   hours due to the prep and sedation    7. CALL YOUR PHYSICIAN IF YOU EXPERIENCE ANY OF THE FOLLOWING      A.  Passing blood rectally or vomiting blood (color may be red or black)      B.  Severe abdominal pain or tenderness (that is not relieved by passing air)      C.  Fever, chills, or excessive sweating      D.  Persistent nausea or vomiting      E.  Redness or swelling at the IV site    If you have additional questions, PLEASE call your doctor or the [facility] GI Unit ([phone])

## 2024-05-03 NOTE — H&P
History and Physical GI Update    Pt Name: Casper Cummins  MRN: [id number]  YOB: 1950  Date of evaluation: 5/3/2024      [x] I have reviewed the CARDIOLOGY PROGRESS NOTE OF 4/12/2024 BY  WHICH MEETS CRITERIA FOR AN  Interval History and Physical note and is attached below.    [x] I have examined  Casper Cummins and there are no changes to the patient or plans for a COLONOSCOPY .by Dr JACKSON  for COLORECTAL CANCER SCREENING.. Bowel Prep completed: Yes with ADEQUATE results. Last colonoscopy 4 OR 5 YEARS AGO..  Biopsies were not taken.  The patient denies BLEEDING.    Patient today denies  fever, chills, night sweats, pain or unexplained weight loss. HE HAS 5 CORONARY STENTS.HE TAKES PLAVIX. HE DOES NOT HAVE DIABETES.    Vital signs: BP (!) 152/77   Pulse 63   Temp 96.8 °F (36 °C)   Resp 16   Ht 1.778 m (5' 10\")   Wt 99.3 kg (219 lb)   SpO2 98%   BMI 31.42 kg/m²     Allergies:  Patient has no known allergies.    Medications:   No current facility-administered medications on file prior to encounter.     Current Outpatient Medications on File Prior to Encounter   Medication Sig Dispense Refill    tamsulosin (FLOMAX) 0.4 MG capsule Take 1 capsule by mouth daily              Prior to Admission medications    Medication Sig Start Date End Date Taking? Authorizing Provider   bumetanide (BUMEX) 2 MG tablet Take 1 tablet by mouth daily 4/22/24   Markell Saravia DO   lisinopril (PRINIVIL;ZESTRIL) 5 MG tablet Take 1 tablet by mouth daily  Patient not taking: Reported on 5/3/2024 4/18/24   Markell Saravia DO   simvastatin (ZOCOR) 80 MG tablet TAKE ONE TABLET BY MOUTH DAILY 4/12/24   Markell Saravia DO   clopidogrel (PLAVIX) 75 MG tablet TAKE 1 TABLET BY MOUTH DAILY 4/2/24   Markell Saravia DO   midodrine (PROAMATINE) 5 MG tablet Take 1 tablet by mouth 3 times daily as needed (for SBP <90) 4/2/24   Magdalena Blood MD   tamsulosin (FLOMAX) 0.4 MG capsule Take 1 capsule by mouth daily    ProviderDavida MD  different from the original.  Images from the original note were not included.      Haydee Rivera MD, [facility]  Daryn Mccrary, [place], [place]    [address] [place] [address]  Tel: [phone]  Fax: [phone]    Name: Casper Cummins : 1950  MRN: [id number] Gender: male  PCP: Diane Bowie DO Age: 73 y.o.  PCP Fax: [phone]    Visit Date: 24    CHIEF COMPLAINT:    Casper Cummins is an 73 y.o. male    Here for an initial visit. Here to establish with new cardiologist. Extensive cardiac history.    Had been active until recently. Was feeling poorly and BP low. Sent to [facility] ER from [facility] office per pt.  Multiple med changes made. Doing better now. Has PRN midodrine.    3/24 DC Summary  Brief Inpatient course:  Casper Cummins is a 73 y.o. male who was admitted for the management of Metabolic acidosis, presented to the emergency department with the chief complaint of dizziness. On 3/29/2024, he was following up with neurosurgery for his chronic neck pain. He was found to have low blood pressure. He was sent to the ED from that office.    In the ED, he was hypotensive and had an HILARIO with creatinine 4.4. Initial labs was concerning for known anion gap metabolic acidosis and hyperkalemia with potassium 6.6. He received calcium, insulin, dextrose and started on discharge bicarb drip for non-anion gap metabolic acidosis in the ED. Initially, he was requiring Levophed for hypotension and hence he was transferred to the MICU for further management.    In the MICU, his creatinine improved on the bicarbonate drip. Nephrology was following the patient. His antihypertensives and diuretics were held. Levophed was weaned off and he was started on scheduled midodrine 10 mg 3 times daily. While inpatient, his hemoglobin dropped from 9.6-7.7 and then went up to 3.8. FOBT workup was negative. His troponins were also elevated for which cardiology was following the patient and

## 2024-05-07 LAB — SURGICAL PATHOLOGY REPORT: NORMAL

## 2024-05-13 ENCOUNTER — TELEPHONE (OUTPATIENT)
Dept: GASTROENTEROLOGY | Age: 74
End: 2024-05-13

## 2024-05-13 NOTE — TELEPHONE ENCOUNTER
patientPolyps removed from your colon were consistent with tubular adenoma.  You will be repeating colonoscopy in 1 year with 2-day prep.  Follow-up with PCP

## 2024-05-21 ENCOUNTER — HOSPITAL ENCOUNTER (OUTPATIENT)
Age: 74
Setting detail: SPECIMEN
Discharge: HOME | End: 2024-05-21

## 2024-05-21 DIAGNOSIS — Z87.448 HX OF ACUTE RENAL FAILURE: ICD-10-CM

## 2024-05-21 DIAGNOSIS — N18.2 CKD (CHRONIC KIDNEY DISEASE), STAGE II: ICD-10-CM

## 2024-05-21 DIAGNOSIS — I10 PRIMARY HYPERTENSION: ICD-10-CM

## 2024-05-21 LAB
CREAT UR-MCNC: 143 MG/DL (ref 39–259)
ERYTHROCYTE [DISTWIDTH] IN BLOOD BY AUTOMATED COUNT: 14.8 % (ref 11.8–14.4)
HCT VFR BLD AUTO: 33.5 % (ref 40.7–50.3)
HGB BLD-MCNC: 10.8 G/DL (ref 13–17)
MCH RBC QN AUTO: 31.2 PG (ref 25.2–33.5)
MCHC RBC AUTO-ENTMCNC: 32.2 G/DL (ref 28.4–34.8)
MCV RBC AUTO: 96.8 FL (ref 82.6–102.9)
NRBC BLD-RTO: 0 PER 100 WBC
PLATELET # BLD AUTO: 167 K/UL (ref 138–453)
PMV BLD AUTO: 9.7 FL (ref 8.1–13.5)
RBC # BLD AUTO: 3.46 M/UL (ref 4.21–5.77)
TOTAL PROTEIN, URINE: 18 MG/DL
URINE TOTAL PROTEIN CREATININE RATIO: 0.13
WBC OTHER # BLD: 7.1 K/UL (ref 3.5–11.3)

## 2024-06-17 ENCOUNTER — TELEPHONE (OUTPATIENT)
Dept: PHARMACY | Facility: CLINIC | Age: 74
End: 2024-06-17

## 2024-06-17 ENCOUNTER — OFFICE VISIT (OUTPATIENT)
Dept: PRIMARY CARE CLINIC | Age: 74
End: 2024-06-17
Payer: MEDICARE

## 2024-06-17 VITALS
WEIGHT: 223 LBS | SYSTOLIC BLOOD PRESSURE: 132 MMHG | HEART RATE: 64 BPM | OXYGEN SATURATION: 97 % | DIASTOLIC BLOOD PRESSURE: 80 MMHG | BODY MASS INDEX: 32 KG/M2

## 2024-06-17 DIAGNOSIS — I25.10 CORONARY ARTERY DISEASE INVOLVING NATIVE CORONARY ARTERY OF NATIVE HEART WITHOUT ANGINA PECTORIS: ICD-10-CM

## 2024-06-17 DIAGNOSIS — M54.41 ACUTE RIGHT-SIDED LOW BACK PAIN WITH RIGHT-SIDED SCIATICA: Primary | ICD-10-CM

## 2024-06-17 DIAGNOSIS — I10 PRIMARY HYPERTENSION: ICD-10-CM

## 2024-06-17 DIAGNOSIS — M54.12 CERVICAL RADICULOPATHY: ICD-10-CM

## 2024-06-17 PROCEDURE — 3079F DIAST BP 80-89 MM HG: CPT | Performed by: FAMILY MEDICINE

## 2024-06-17 PROCEDURE — 3075F SYST BP GE 130 - 139MM HG: CPT | Performed by: FAMILY MEDICINE

## 2024-06-17 PROCEDURE — 99214 OFFICE O/P EST MOD 30 MIN: CPT | Performed by: FAMILY MEDICINE

## 2024-06-17 PROCEDURE — 1123F ACP DISCUSS/DSCN MKR DOCD: CPT | Performed by: FAMILY MEDICINE

## 2024-06-17 RX ORDER — PREDNISONE 20 MG/1
40 TABLET ORAL 2 TIMES DAILY
Qty: 20 TABLET | Refills: 0 | Status: SHIPPED | OUTPATIENT
Start: 2024-06-17 | End: 2024-06-17

## 2024-06-17 RX ORDER — PREDNISONE 20 MG/1
40 TABLET ORAL DAILY
Qty: 10 TABLET | Refills: 0 | Status: SHIPPED | OUTPATIENT
Start: 2024-06-17 | End: 2024-06-22

## 2024-06-17 ASSESSMENT — ENCOUNTER SYMPTOMS
VOMITING: 0
BACK PAIN: 1

## 2024-06-17 NOTE — TELEPHONE ENCOUNTER
[facility] CLINICAL PHARMACY: ADHERENCE REVIEW  Identified care gap per [employer]: fills at [facility]: ACE/ARB adherence    Patient also appears to be prescribed: Statin    ASSESSMENT    ACE/ARB ADHERENCE    Insurance Records claims through 24 (Prior Year PDC = not reported; YTD PDC = 67%; Potential Fail Date: 24):   LISINOPRIL TAB 10MG last filled on 24 for 90 day supply. Next refill due: 24    Prescribed si tablet/capsule daily    Per Insurer Portal: last filled on same      BP Readings from Last 3 Encounters:   24 132/80   24 130/78   05/10/24 132/78     Estimated Creatinine Clearance: 62 mL/min (A) (based on SCr of 1.3 mg/dL (H)).  Lab Results   Component Value Date    CREATININE 1.3 (H) 2024     Lab Results   Component Value Date    K 3.5 (L) 2024         The following are interventions that have been identified:   Patient OVERDUE refilling Lisinopril and active on home medication list.     Attempting to reach patient to review - unable to leave message.   Lisinopril was decreased from 10 mg to 5 mg then dc'd  05.10.24    Last Visit: 24  Next Visit: none    Racquel Short CPhT  [facility] Clinical   [facility] Clinical [facility]  Toll Free: [phone] Option 1    For Pharmacy Admin Tracking Only    Program: [employer] Channel  CPA in place:  No  Gap Closed?: Yes   Time Spent (min): 15

## 2024-06-21 ENCOUNTER — HOSPITAL ENCOUNTER (OUTPATIENT)
Age: 74
Setting detail: SPECIMEN
Discharge: HOME | End: 2024-06-21

## 2024-06-21 ENCOUNTER — HOSPITAL ENCOUNTER (OUTPATIENT)
Dept: GENERAL RADIOLOGY | Age: 74
End: 2024-06-21
Payer: MEDICARE

## 2024-06-21 ENCOUNTER — HOSPITAL ENCOUNTER (OUTPATIENT)
Age: 74
End: 2024-06-21
Payer: MEDICARE

## 2024-06-21 DIAGNOSIS — M54.41 ACUTE RIGHT-SIDED LOW BACK PAIN WITH RIGHT-SIDED SCIATICA: ICD-10-CM

## 2024-06-21 LAB — PSA SERPL-MCNC: 3.3 NG/ML (ref 0–4)

## 2024-06-21 PROCEDURE — 72100 X-RAY EXAM L-S SPINE 2/3 VWS: CPT

## 2024-07-05 DIAGNOSIS — M54.41 ACUTE RIGHT-SIDED LOW BACK PAIN WITH RIGHT-SIDED SCIATICA: ICD-10-CM

## 2024-07-05 NOTE — TELEPHONE ENCOUNTER
The patient called asking for another course of Prednisone to be sent to his pharmacy, as this medication is helping his back pain.  Patient states that he has an appointment with Physical Therapy next Friday and is asking for medication to last until then.    Please advise.    Last OV:  6/17/2024    Next OV: 12/17/2024    Pharmacy:   [facility] [id number] - [place] - [address] - P [phone] - F [phone]  [facility] [place] [address]  Phone: [phone] Fax: [phone]       Confirmed? Yes

## 2024-07-06 RX ORDER — PREDNISONE 20 MG/1
40 TABLET ORAL DAILY
Qty: 10 TABLET | Refills: 0 | Status: SHIPPED | OUTPATIENT
Start: 2024-07-06 | End: 2024-07-11

## 2024-07-06 NOTE — TELEPHONE ENCOUNTER
I don't recommend more than one more 5 days worth of prednisone as he already had five days worth previously.I have sent a script.

## 2024-07-06 NOTE — TELEPHONE ENCOUNTER
Call made to the patient to inform them of their PCP's instructions, writer left a voice message informing the patient of PCP instructions and that medication was sent to the pharmacy.

## 2024-07-12 ENCOUNTER — HOSPITAL ENCOUNTER (OUTPATIENT)
Dept: PHYSICAL THERAPY | Facility: CLINIC | Age: 74
Setting detail: THERAPIES SERIES
Discharge: HOME | End: 2024-07-12
Payer: MEDICARE

## 2024-07-12 PROCEDURE — 97110 THERAPEUTIC EXERCISES: CPT

## 2024-07-12 PROCEDURE — 97161 PT EVAL LOW COMPLEX 20 MIN: CPT

## 2024-07-12 NOTE — CONSULTS
[] [facility]  Outpatient Rehabilitation &  Therapy  [address]  [phone]  [phone] [] [facility]  Outpatient Rehabilitation &  Therapy  [address]  P: [phone]  F: [phone] [] [facility]  Outpatient Rehabilitation &  Therapy  [address]  P: [phone]  F: [phone] [x] [facility]  Outpatient Rehabilitation &  Therapy  [address]  [phone]  [phone] [] [facility]  Outpatient Rehabilitation &  Therapy  [address]   P: [phone]  F: [phone]  [] [facility]  Outpatient Rehabilitation &  Therapy  [address]  P: [phone]  F: [phone] [] [facility]  Outpatient Rehabilitation &  Therapy  [address]  P: [phone]  F: [phone] [] [facility]  Outpatient Rehabilitation &  Therapy  [address]  P: [phone]  F: [phone] [] [facility]  Outpatient Rehabilitation &  Therapy  [address]  P: [phone]  F: [phone]  [] [facility] Outpatient Rehabilitation &  Therapy  [address]  P: [phone]  F: [phone]     Physical Therapy Spine Evaluation    Date:  2024  Patient: Casper Cummins  : 1950  MRN: [id number]  Physician: Diane Bowie DO    Insurance: [employer] Medicare- [employer]  Medical Diagnosis: M54.41 (ICD-10-CM) - Acute right-sided low back pain with right-sided sciatica    Rehab Codes: M54.41 (ICD-10-CM) - Acute right-sided low back pain with right-sided sciatica; M48.06; M62.81  Onset Date: 2024- fall; see below  Next 's appt.: 24- neuro; 24- PCP    Subjective:   CC:    24- awkward fall upon his back patio- \"just felt a big crack.\"   Describes falling with lumbar spine in extreme extension.   New onset of

## 2024-07-12 NOTE — FLOWSHEET NOTE
[facility] Fall Risk Assessment    Patient Name:  Casper Cummins  : 1950    Risk Factor Scale  Score   History of Falls [x] Yes  [] No 25  0 25   Secondary Diagnosis [] Yes  [x] No 15  0 0   Ambulatory Aid [] Furniture  [] Crutches/cane/walker  [x] None/bedrest/wheelchair/nurse 30  15  0 0   IV/Heparin Lock [] Yes  [x] No 20  0 0   Gait/Transferring [] Impaired  [x] Weak  [] Normal/bedrest/immobile 20  10  0 10   Mental Status [] Forgets limitations  [x] Oriented to own ability 15  0 0      Total: 35     Based on the Assessment score: check the appropriate box.    []  No intervention needed   Low =   Score of 0-24    [x]  Use standard prevention interventions Moderate =  Score of 24-44   [x] Give patient handout and discuss fall prevention strategies   [x] Establish goal of education for patient/family RE: fall prevention strategies    []  Use high risk prevention interventions High = Score of 45 and higher   [] Give patient handout and discuss fall prevention strategies   [] Establish goal of education for patient/family Re: fall prevention strategies   [] Discuss lifeline / other resources    Electronically signed by:   Audrey Menjivar PT  Date: 2024

## 2024-07-12 NOTE — FLOWSHEET NOTE
[] [facility]  Outpatient Rehabilitation &  Therapy  [address]  [phone]  [phone] [] [facility]  Outpatient Rehabilitation &  Therapy  [address]  P: [phone]  F: [phone] [] [facility]  Outpatient Rehabilitation &  Therapy  [address]  P: [phone]  F: [phone] [x] [facility]  Outpatient Rehabilitation &  Therapy  [address]  [phone]  [phone] [] [facility]  Outpatient Rehabilitation &  Therapy  [address]   P: [phone]  F: [phone]  [] [facility]  Outpatient Rehabilitation &  Therapy  [address]  P: [phone]  F: [phone] [] [facility]  Outpatient Rehabilitation &  Therapy  [address]  P: [phone]  F: [phone] [] [facility]  Outpatient Rehabilitation &  Therapy  [address]  P: [phone]  F: [phone] [] [facility]  Outpatient Rehabilitation &  Therapy  [address]  P: [phone]  F: [phone]  [] [facility] Outpatient Rehabilitation &  Therapy  [address]  P: [phone]  F: [phone]     THERAPY RESPONSIBILITY OF CARE TRANSFER FORM    PATIENT NAME: Casper Cummins  MRN: [id number]   : 1950    TRANSFERRING FACILITY:    [] [place]   [] [facility]   [] [place]   [] [place] [facility]   [] [facility] [] [place]   [] [facility]  [x] [place] [facility]  [] [facility]   [] [place]  [] [place]   [] Other:      ACCEPTING FACILITY  [] [place]   [] [facility] [facility]   [] [place]   [] [place] [facility]   [] [facility] [] [place]   [] [facility]  [x] [facility]  [] [facility]   [] [place]  [] [place]   [] Other:       REASON FOR TRANSFER: primary PT resigning.     TRANSFER OF  Post-Care Instructions: I reviewed with the patient in detail post-care instructions. Patient is to wear sunprotection, and avoid picking at any of the treated lesions. Pt may apply Vaseline to crusted or scabbing areas. Number Of Freeze-Thaw Cycles: 1 freeze-thaw cycle Consent: The patient's consent was obtained including but not limited to risks of crusting, scabbing, blistering, scarring, darker or lighter pigmentary change, recurrence, incomplete removal and infection. Render Post-Care Instructions In Note?: no Detail Level: Zone Total Number Of Aks Treated: 10 Duration Of Freeze Thaw-Cycle (Seconds): 5

## 2024-07-17 ENCOUNTER — OFFICE VISIT (OUTPATIENT)
Dept: NEUROSURGERY | Age: 74
End: 2024-07-17
Payer: MEDICARE

## 2024-07-17 ENCOUNTER — HOSPITAL ENCOUNTER (OUTPATIENT)
Dept: PHYSICAL THERAPY | Facility: CLINIC | Age: 74
Setting detail: THERAPIES SERIES
Discharge: HOME | End: 2024-07-17
Payer: MEDICARE

## 2024-07-17 VITALS
DIASTOLIC BLOOD PRESSURE: 79 MMHG | SYSTOLIC BLOOD PRESSURE: 126 MMHG | WEIGHT: 224 LBS | HEART RATE: 77 BPM | BODY MASS INDEX: 32.07 KG/M2 | HEIGHT: 70 IN

## 2024-07-17 DIAGNOSIS — M48.062 LUMBAR STENOSIS WITH NEUROGENIC CLAUDICATION: ICD-10-CM

## 2024-07-17 DIAGNOSIS — M48.02 STENOSIS OF CERVICAL SPINE WITH MYELOPATHY (HCC): Primary | ICD-10-CM

## 2024-07-17 DIAGNOSIS — G99.2 STENOSIS OF CERVICAL SPINE WITH MYELOPATHY (HCC): Primary | ICD-10-CM

## 2024-07-17 PROCEDURE — 99214 OFFICE O/P EST MOD 30 MIN: CPT | Performed by: NEUROLOGICAL SURGERY

## 2024-07-17 PROCEDURE — 1123F ACP DISCUSS/DSCN MKR DOCD: CPT | Performed by: NEUROLOGICAL SURGERY

## 2024-07-17 PROCEDURE — 97110 THERAPEUTIC EXERCISES: CPT

## 2024-07-17 PROCEDURE — 3074F SYST BP LT 130 MM HG: CPT | Performed by: NEUROLOGICAL SURGERY

## 2024-07-17 PROCEDURE — 3078F DIAST BP <80 MM HG: CPT | Performed by: NEUROLOGICAL SURGERY

## 2024-07-17 RX ORDER — PREDNISONE 20 MG/1
TABLET ORAL
Qty: 30 TABLET | Refills: 0 | Status: SHIPPED | OUTPATIENT
Start: 2024-07-17 | End: 2024-08-01

## 2024-07-17 NOTE — PROGRESS NOTES
[facility] NEUROSURGERY [facility]  [address]  [address]  [address] GROUND FLOOR, [address]  [facility] [address]  Dept: [phone]    Patient:  Casper Cummins  YOB: 1950  Date: 7/17/24    The patient is a 74 y.o. male who presents today for consult of the following problems:     Chief Complaint   Patient presents with    Other     Follow up for neck which has been fine, has since fallen now having lower back (right side worse than left) pain radiating down legs             HPI:     Casper Cummins is a 74 y.o. male on whom neurosurgical consultation was requested by Diane Bowie DO for management of cervical stenosis with myelopathy.  Patient has persistent numbness of the first and second digit of the left hand.  Otherwise no progressive issues involving dexterity and general use of his hands as well as ADLs.  Has been able to ambulate with use of a cane and denies any issues with balance.  Did have a fall recently.  States that he missed a step and went down.    Patient describes significant bilateral paraspinal pain radiating along the anterolateral aspect of the thigh as well as the posterior aspect of the hamstrings terminating at the popliteal fossa.  Denies any distal numbness of bilateral feet.  States that the pain is worse in the morning originating at approximately 10 and with progression over the course of the morning and gentle stretches he does get the pain down to approximately 6 out of 10.  Does use heat as needed as well as daily Tylenol.  No other medications over-the-counter or prescription.  Has initiated physical therapy with only a few visits thus far.  Leg pain is significant worse than his back and states that symptoms appear to mitigate significant when he is sitting down or leaning forward on a shopping cart or other surface.  Can only stand or mobilize short periods of time before he gets significant

## 2024-07-17 NOTE — FLOWSHEET NOTE
[] [facility]  Outpatient Rehabilitation &  Therapy  [address]  [phone]  [phone] [] [facility]  Outpatient Rehabilitation &  Therapy  [address]  P: [phone]  F: [phone] [] [facility]  Outpatient Rehabilitation &  Therapy  [address]  P: [phone]  F: [phone] [x] [facility]  Outpatient Rehabilitation &  Therapy  [address]  [phone]  [phone] [] [facility]  Outpatient Rehabilitation &  Therapy  [address]   P: [phone]  F: [phone]  [] [facility]  Outpatient Rehabilitation &  Therapy  [address]  P: [phone]  F: [phone] [] [facility]  Outpatient Rehabilitation &  Therapy  [address]  P: [phone]  F: [phone] [] [facility]  Outpatient Rehabilitation &  Therapy  [address]  P: [phone]  F: [phone] [] [facility]  Outpatient Rehabilitation &  Therapy  [address]  P: [phone]  F: [phone]  [] [facility] Outpatient Rehabilitation &  Therapy  [address]  P: [phone]  F: [phone]     Physical Therapy Daily Treatment Note    Date:  2024  Patient Name:  Casper Cummins    :  1950  MRN: [id number]  Physician: Diane Bowie DO                            Insurance: [employer] Medicare- [facility]  Medical Diagnosis: M54.41 (ICD-10-CM) - Acute right-sided low back pain with right-sided sciatica       Rehab Codes: M54.41 (ICD-10-CM) - Acute right-sided low back pain with right-sided sciatica; M48.06; M62.81  Onset Date: 2024- fall; see below                     Next 's appt.: 24- PCP; October- neuro-surgery   Visit# / total visits: ; Progress note for Medicare patient due at visit 10     Cancels/No Shows:

## 2024-07-19 ENCOUNTER — HOSPITAL ENCOUNTER (OUTPATIENT)
Dept: PHYSICAL THERAPY | Facility: CLINIC | Age: 74
Setting detail: THERAPIES SERIES
Discharge: HOME | End: 2024-07-19
Payer: MEDICARE

## 2024-07-19 PROCEDURE — 97110 THERAPEUTIC EXERCISES: CPT

## 2024-07-19 NOTE — FLOWSHEET NOTE
[] [facility]  Outpatient Rehabilitation &  Therapy  [address]  [phone]  [phone] [] [facility]  Outpatient Rehabilitation &  Therapy  [address]  P: [phone]  F: [phone] [] [facility]  Outpatient Rehabilitation &  Therapy  [address]  P: [phone]  F: [phone] [x] [facility]  Outpatient Rehabilitation &  Therapy  [address]  [phone]  [phone] [] [facility]  Outpatient Rehabilitation &  Therapy  [address]   P: [phone]  F: [phone]  [] [facility]  Outpatient Rehabilitation &  Therapy  [address]  P: [phone]  F: [phone] [] [facility]  Outpatient Rehabilitation &  Therapy  [address]  P: [phone]  F: [phone] [] [facility]  Outpatient Rehabilitation &  Therapy  [address]  P: [phone]  F: [phone] [] [facility]  Outpatient Rehabilitation &  Therapy  [address]  P: [phone]  F: [phone]  [] [facility] Outpatient Rehabilitation &  Therapy  [address]  P: [phone]  F: [phone]     Physical Therapy Daily Treatment Note    Date:  2024  Patient Name:  Casper Cummins    :  1950  MRN: [id number]  Physician: Diane Bowie DO                            Insurance: [employer] Medicare- [facility]  Medical Diagnosis: M54.41 (ICD-10-CM) - Acute right-sided low back pain with right-sided sciatica       Rehab Codes: M54.41 (ICD-10-CM) - Acute right-sided low back pain with right-sided sciatica; M48.06; M62.81  Onset Date: 2024- fall; see below                     Next 's appt.: 24- PCP; October- neuro-surgery   Visit# / total visits: 3/12; Progress note for Medicare patient due at visit 10     Cancels/No Shows:

## 2024-07-23 ENCOUNTER — HOSPITAL ENCOUNTER (OUTPATIENT)
Dept: PHYSICAL THERAPY | Facility: CLINIC | Age: 74
Setting detail: THERAPIES SERIES
Discharge: HOME | End: 2024-07-23
Payer: MEDICARE

## 2024-07-23 PROCEDURE — 97110 THERAPEUTIC EXERCISES: CPT

## 2024-07-23 NOTE — FLOWSHEET NOTE
Continue current frequency toward long and short term goals.    [x] Specific Instructions for subsequent treatments: Follow-up with pt regarding tolerance- continue if proven beneficial or modify PRN next visit.        Time In: 9:05am            Time Out: 9:45am    Electronically signed by:  Lili Hernandez PTA

## 2024-07-26 ENCOUNTER — HOSPITAL ENCOUNTER (OUTPATIENT)
Dept: PHYSICAL THERAPY | Facility: CLINIC | Age: 74
Setting detail: THERAPIES SERIES
Discharge: HOME | End: 2024-07-26
Payer: MEDICARE

## 2024-07-26 PROCEDURE — 97140 MANUAL THERAPY 1/> REGIONS: CPT

## 2024-07-26 PROCEDURE — 97110 THERAPEUTIC EXERCISES: CPT

## 2024-07-26 NOTE — FLOWSHEET NOTE
[] [facility]  Outpatient Rehabilitation &  Therapy  [address]  [phone]  [phone] [] [facility]  Outpatient Rehabilitation &  Therapy  [address]  P: [phone]  F: [phone] [] [facility]  Outpatient Rehabilitation &  Therapy  [address]  P: [phone]  F: [phone] [x] [facility]  Outpatient Rehabilitation &  Therapy  [address]  [phone]  [phone] [] [facility]  Outpatient Rehabilitation &  Therapy  [address]   P: [phone]  F: [phone]  [] [facility]  Outpatient Rehabilitation &  Therapy  [address]  P: [phone]  F: [phone] [] [facility]  Outpatient Rehabilitation &  Therapy  [address]  P: [phone]  F: [phone] [] [facility]  Outpatient Rehabilitation &  Therapy  [address]  P: [phone]  F: [phone] [] [facility]  Outpatient Rehabilitation &  Therapy  [address]  P: [phone]  F: [phone]  [] [facility] Outpatient Rehabilitation &  Therapy  [address]  P: [phone]  F: [phone]     Physical Therapy Daily Treatment Note    Date:  2024  Patient Name:  Casper Cummins    :  1950  MRN: [id number]  Physician: Diane Bowie DO                            Insurance: [employer] Medicare- [facility]  Medical Diagnosis: M54.41 (ICD-10-CM) - Acute right-sided low back pain with right-sided sciatica       Rehab Codes: M54.41 (ICD-10-CM) - Acute right-sided low back pain with right-sided sciatica; M48.06; M62.81  Onset Date: 2024- fall; see below                     Next 's appt.: 24- PCP; October- neuro-surgery   Visit# / total visits: ; Progress note for Medicare patient due at visit 10     Cancels/No Shows:

## 2024-07-29 ENCOUNTER — HOSPITAL ENCOUNTER (OUTPATIENT)
Dept: PHYSICAL THERAPY | Facility: CLINIC | Age: 74
Setting detail: THERAPIES SERIES
Discharge: HOME | End: 2024-07-29
Payer: MEDICARE

## 2024-07-29 PROCEDURE — 97110 THERAPEUTIC EXERCISES: CPT

## 2024-07-29 PROCEDURE — 97140 MANUAL THERAPY 1/> REGIONS: CPT

## 2024-07-29 NOTE — FLOWSHEET NOTE
[] [facility]  Outpatient Rehabilitation &  Therapy  [address]  [phone]  [phone] [] [facility]  Outpatient Rehabilitation &  Therapy  [address]  P: [phone]  F: [phone] [] [facility]  Outpatient Rehabilitation &  Therapy  [address]  P: [phone]  F: [phone] [x] [facility]  Outpatient Rehabilitation &  Therapy  [address]  [phone]  [phone] [] [facility]  Outpatient Rehabilitation &  Therapy  [address]   P: [phone]  F: [phone]  [] [facility]  Outpatient Rehabilitation &  Therapy  [address]  P: [phone]  F: [phone] [] [facility]  Outpatient Rehabilitation &  Therapy  [address]  P: [phone]  F: [phone] [] [facility]  Outpatient Rehabilitation &  Therapy  [address]  P: [phone]  F: [phone] [] [facility]  Outpatient Rehabilitation &  Therapy  [address]  P: [phone]  F: [phone]  [] [facility] Outpatient Rehabilitation &  Therapy  [address]  P: [phone]  F: [phone]     Physical Therapy Daily Treatment Note    Date:  2024  Patient Name:  Casper Cummins    :  1950  MRN: [id number]  Physician: Diane Bowie DO                            Insurance: [employer] Medicare- [facility]  Medical Diagnosis: M54.41 (ICD-10-CM) - Acute right-sided low back pain with right-sided sciatica       Rehab Codes: M54.41 (ICD-10-CM) - Acute right-sided low back pain with right-sided sciatica; M48.06; M62.81  Onset Date: 2024- fall; see below                     Next 's appt.: 24- PCP; October- neuro-surgery   Visit# / total visits: ; Progress note for Medicare patient due at visit 10     Cancels/No Shows:

## 2024-08-02 ENCOUNTER — HOSPITAL ENCOUNTER (OUTPATIENT)
Dept: PHYSICAL THERAPY | Facility: CLINIC | Age: 74
Setting detail: THERAPIES SERIES
Discharge: HOME | End: 2024-08-02
Payer: MEDICARE

## 2024-08-02 PROCEDURE — 97110 THERAPEUTIC EXERCISES: CPT

## 2024-08-02 PROCEDURE — 97140 MANUAL THERAPY 1/> REGIONS: CPT

## 2024-08-02 NOTE — FLOWSHEET NOTE
0/0    Subjective:  Patient states he does have some soreness in bilateral buttocks and behind leg when first waking up in the morning. He notes as the day progress the pain improves.     Pain:  [x] Yes  [] No Location: B lumbar spine into B posterior thigh Pain Rating: (0-10 scale) \"low\"/10  Pain altered Tx:  [x] No  [] Yes  Action:   Comments:     Visited neuro-surgery- instructed to monitor for ataxia and changes in sensation regarding his cervical myelopathy, but otherwise continue with PT services for lumbar spine.    Today’s Treatment:  Modalities: MHP to B lumbar spine [with extra pillowcase layer] in sitting for x10' post   Precautions: potential lumbar spinal stenosis aggravated by recent fall- symptoms into B posterior thigh which worsen with inc standing, ambulation   Exercises: B TKA  Exercise Reps/ Time Weight/ Level Comments Completed   Nu-step x8' L4  x   Gastroc wedge str.  3x30\" ea L2  x   HS stair str.  2x30\" ea   x   Standing 3-way hip- B x10 ea   x   Standing marches  2x10   x   Heel raises  2x10   x   Moody step overs 10xea  Gait belt- decreased stance time on RLE -   Step ups  2x10 ea  6\" step  Light B UE assist  x   Total Gym Squats  2x15 L 17  x          Seated SB roll-outs  x10 ea Large Red FWD, R/L -   Seated lumbar flexion/divers x10, 5\" ea   -                      LTR- B x5, 5\" ea    x   SKTC 5x10\"   x   Supine PPT x10, 5\" ea    x    PPT+March  2x10     x   Other:   Manual: TPR to piriformis in prone , IASTM w/ hypervolt flat head attachment to B piriformis     Specific Instructions for next treatment: Follow-up with pt regarding tolerance- continue if proven beneficial or modify PRN next visit.      Treatment Charges: Mins Units   []  Modalities     [x]  Ther Exercise 32 2   [x]  Manual Therapy 10 1   []  Ther Activities     []  Neuro Re-ed     []  Vasocompression     [] Gait     []  Other     Total Billable time 42 3     Assessment: [x] Progressing toward goals. Continued with Nustep

## 2024-08-06 ENCOUNTER — HOSPITAL ENCOUNTER (OUTPATIENT)
Dept: PHYSICAL THERAPY | Facility: CLINIC | Age: 74
Setting detail: THERAPIES SERIES
Discharge: HOME | End: 2024-08-06
Payer: MEDICARE

## 2024-08-06 PROCEDURE — 97110 THERAPEUTIC EXERCISES: CPT

## 2024-08-06 NOTE — FLOWSHEET NOTE
[] [facility]  Outpatient Rehabilitation &  Therapy  [address]  [phone]  [phone] [] [facility]  Outpatient Rehabilitation &  Therapy  [address]  P: [phone]  F: [phone] [] [facility]  Outpatient Rehabilitation &  Therapy  [address]  P: [phone]  F: [phone] [x] [facility]  Outpatient Rehabilitation &  Therapy  [address]  [phone]  [phone] [] [facility]  Outpatient Rehabilitation &  Therapy  [address]   P: [phone]  F: [phone]  [] [facility]  Outpatient Rehabilitation &  Therapy  [address]  P: [phone]  F: [phone] [] [facility]  Outpatient Rehabilitation &  Therapy  [address]  P: [phone]  F: [phone] [] [facility]  Outpatient Rehabilitation &  Therapy  [address]  P: [phone]  F: [phone] [] [facility]  Outpatient Rehabilitation &  Therapy  [address]  P: [phone]  F: [phone]  [] [facility] Outpatient Rehabilitation &  Therapy  [address]  P: [phone]  F: [phone]     Physical Therapy Daily Treatment Note    Date:  2024  Patient Name:  Casper Cummins    :  1950  MRN: [id number]  Physician: Diane Bowie DO                            Insurance: A[employer]na Medicare; Von yr; vs based on med nec; no auth req; $40 copay; no ded or coins; 1420/3081.24 remaining OOP   Medical Diagnosis: M54.41 (ICD-10-CM) - Acute right-sided low back pain with right-sided sciatica       Rehab Codes: M54.41 (ICD-10-CM) - Acute right-sided low back pain with right-sided sciatica; M48.06; M62.81  Onset Date: 2024- fall; see below                     Next 's appt.: 24- PCP; October- neuro-surgery   Visit# /

## 2024-08-09 ENCOUNTER — HOSPITAL ENCOUNTER (OUTPATIENT)
Dept: PHYSICAL THERAPY | Facility: CLINIC | Age: 74
Setting detail: THERAPIES SERIES
Discharge: HOME | End: 2024-08-09
Payer: MEDICARE

## 2024-08-09 PROCEDURE — 97110 THERAPEUTIC EXERCISES: CPT

## 2024-08-09 NOTE — FLOWSHEET NOTE
[] [facility]  Outpatient Rehabilitation &  Therapy  [address]  [phone]  [phone] [] [facility]  Outpatient Rehabilitation &  Therapy  [address]  P: [phone]  F: [phone] [] [facility]  Outpatient Rehabilitation &  Therapy  [address]  P: [phone]  F: [phone] [x] [facility]  Outpatient Rehabilitation &  Therapy  [address]  [phone]  [phone] [] [facility]  Outpatient Rehabilitation &  Therapy  [address]   P: [phone]  F: [phone]  [] [facility]  Outpatient Rehabilitation &  Therapy  [address]  P: [phone]  F: [phone] [] [facility]  Outpatient Rehabilitation &  Therapy  [address]  P: [phone]  F: [phone] [] [facility]  Outpatient Rehabilitation &  Therapy  [address]  P: [phone]  F: [phone] [] [facility]  Outpatient Rehabilitation &  Therapy  [address]  P: [phone]  F: [phone]  [] [facility] Outpatient Rehabilitation &  Therapy  [address]  P: [phone]  F: [phone]     Physical Therapy Daily Treatment Note    Date:  2024  Patient Name:  Casper Cummins    :  1950  MRN: [id number]  Physician: Diane Bowie DO                            Insurance: A[employer]na Medicare; Von yr; vs based on med nec; no auth req; $40 copay; no ded or coins; 7672/6757.24 remaining OOP   Medical Diagnosis: M54.41 (ICD-10-CM) - Acute right-sided low back pain with right-sided sciatica       Rehab Codes: M54.41 (ICD-10-CM) - Acute right-sided low back pain with right-sided sciatica; M48.06; M62.81  Onset Date: 2024- fall; see below                     Next 's appt.: 24- PCP; October- neuro-surgery   Visit# /

## 2024-08-14 ENCOUNTER — HOSPITAL ENCOUNTER (OUTPATIENT)
Dept: PHYSICAL THERAPY | Facility: CLINIC | Age: 74
Setting detail: THERAPIES SERIES
Discharge: HOME | End: 2024-08-14
Payer: MEDICARE

## 2024-08-14 PROCEDURE — 97110 THERAPEUTIC EXERCISES: CPT

## 2024-08-14 NOTE — FLOWSHEET NOTE
[] [facility]  Outpatient Rehabilitation &  Therapy  [address]  [phone]  [phone] [] [facility]  Outpatient Rehabilitation &  Therapy  [address]  P: [phone]  F: [phone] [] [facility]  Outpatient Rehabilitation &  Therapy  [address]  P: [phone]  F: [phone] [x] [facility]  Outpatient Rehabilitation &  Therapy  [address]  [phone]  [phone] [] [facility]  Outpatient Rehabilitation &  Therapy  [address]   P: [phone]  F: [phone]  [] [facility]  Outpatient Rehabilitation &  Therapy  [address]  P: [phone]  F: [phone] [] [facility]  Outpatient Rehabilitation &  Therapy  [address]  P: [phone]  F: [phone] [] [facility]  Outpatient Rehabilitation &  Therapy  [address]  P: [phone]  F: [phone] [] [facility]  Outpatient Rehabilitation &  Therapy  [address]  P: [phone]  F: [phone]  [] [facility] Outpatient Rehabilitation &  Therapy  [address]  P: [phone]  F: [phone]     Physical Therapy Daily Treatment Note    Date:  2024  Patient Name:  Casper Cummins    :  1950  MRN: [id number]  Physician: Diane Bowie DO                            Insurance: A[employer]na Medicare; Von yr; vs based on med nec; no auth req; $40 copay; no ded or coins; 3260/9241.24 remaining OOP   Medical Diagnosis: M54.41 (ICD-10-CM) - Acute right-sided low back pain with right-sided sciatica       Rehab Codes: M54.41 (ICD-10-CM) - Acute right-sided low back pain with right-sided sciatica; M48.06; M62.81  Onset Date: 2024- fall; see below                     Next 's appt.: 24- PCP; October- neuro-surgery   Visit# /

## 2024-08-16 ENCOUNTER — HOSPITAL ENCOUNTER (OUTPATIENT)
Dept: PHYSICAL THERAPY | Facility: CLINIC | Age: 74
Setting detail: THERAPIES SERIES
Discharge: HOME | End: 2024-08-16
Payer: MEDICARE

## 2024-08-16 PROCEDURE — 97110 THERAPEUTIC EXERCISES: CPT

## 2024-08-20 ENCOUNTER — HOSPITAL ENCOUNTER (OUTPATIENT)
Dept: PHYSICAL THERAPY | Facility: CLINIC | Age: 74
Setting detail: THERAPIES SERIES
Discharge: HOME | End: 2024-08-20
Payer: MEDICARE

## 2024-08-20 PROCEDURE — 97110 THERAPEUTIC EXERCISES: CPT

## 2024-08-20 NOTE — FLOWSHEET NOTE
with Counter Support  - 1 x daily - 7 x weekly - 2 sets - 10 reps  - Standing Hip Flexion with Counter Support  - 1 x daily - 7 x weekly - 2 sets - 10 reps  - Supine Transversus Abdominis Bracing - Hands on Stomach  - 1 x daily - 7 x weekly - 2 sets - 10 reps      Plan: [] Continue current frequency toward long and short term goals.    [] Specific Instructions for subsequent treatments:  Pt to be put on hold until follow up with physician.       Time In: 1100        Time Out: 1200    Electronically signed by:  Joann Valencia PTA

## 2024-08-21 ENCOUNTER — TELEPHONE (OUTPATIENT)
Dept: PRIMARY CARE CLINIC | Age: 74
End: 2024-08-21

## 2024-08-21 DIAGNOSIS — M48.062 LUMBAR STENOSIS WITH NEUROGENIC CLAUDICATION: Primary | ICD-10-CM

## 2024-08-21 DIAGNOSIS — M54.41 RIGHT-SIDED LOW BACK PAIN WITH RIGHT-SIDED SCIATICA, UNSPECIFIED CHRONICITY: ICD-10-CM

## 2024-08-21 NOTE — TELEPHONE ENCOUNTER
Ok I have ordered it, he can call and schedule. I would recommend he call imaging center a few days before his MRI to make sure it is approved. Please provide with scheduling number

## 2024-08-21 NOTE — TELEPHONE ENCOUNTER
Last Visit Date: 6/17/2024   Next Visit Date: 12/17/2024   Pt called stating he has gone through PT and had an xray. Caller stated he has an appt with a neurosurgeon but stated the surgeon wants him to have an MRI completed prior to appt but stated PCP should order.    Please advise

## 2024-08-22 ENCOUNTER — APPOINTMENT (OUTPATIENT)
Dept: PHYSICAL THERAPY | Facility: CLINIC | Age: 74
End: 2024-08-22
Payer: MEDICARE

## 2024-08-29 ENCOUNTER — HOSPITAL ENCOUNTER (OUTPATIENT)
Dept: MRI IMAGING | Age: 74
Discharge: HOME | End: 2024-08-31
Payer: MEDICARE

## 2024-08-29 DIAGNOSIS — M48.062 LUMBAR STENOSIS WITH NEUROGENIC CLAUDICATION: ICD-10-CM

## 2024-08-29 DIAGNOSIS — M54.41 RIGHT-SIDED LOW BACK PAIN WITH RIGHT-SIDED SCIATICA, UNSPECIFIED CHRONICITY: ICD-10-CM

## 2024-08-29 PROCEDURE — 72148 MRI LUMBAR SPINE W/O DYE: CPT

## 2024-09-04 DIAGNOSIS — S32.009A CLOSED FRACTURE OF LUMBAR SPINE WITHOUT SPINAL CORD LESION, INITIAL ENCOUNTER (HCC): Primary | ICD-10-CM

## 2024-09-16 ENCOUNTER — HOSPITAL ENCOUNTER (OUTPATIENT)
Age: 74
Setting detail: SPECIMEN
Discharge: HOME | End: 2024-09-16

## 2024-09-16 DIAGNOSIS — N17.9 AKI (ACUTE KIDNEY INJURY) (HCC): ICD-10-CM

## 2024-09-16 LAB
ANION GAP SERPL CALCULATED.3IONS-SCNC: 13 MMOL/L (ref 9–16)
BUN SERPL-MCNC: 16 MG/DL (ref 8–23)
CALCIUM SERPL-MCNC: 9.6 MG/DL (ref 8.6–10.4)
CHLORIDE SERPL-SCNC: 102 MMOL/L (ref 98–107)
CO2 SERPL-SCNC: 26 MMOL/L (ref 20–31)
CREAT SERPL-MCNC: 1 MG/DL (ref 0.7–1.2)
GFR, ESTIMATED: 83 ML/MIN/1.73M2
GLUCOSE SERPL-MCNC: 92 MG/DL (ref 74–99)
POTASSIUM SERPL-SCNC: 3.7 MMOL/L (ref 3.7–5.3)
SODIUM SERPL-SCNC: 141 MMOL/L (ref 136–145)

## 2024-12-16 ENCOUNTER — HOSPITAL ENCOUNTER (OUTPATIENT)
Age: 74
Setting detail: SPECIMEN
Discharge: HOME | End: 2024-12-16

## 2024-12-16 DIAGNOSIS — N17.9 AKI (ACUTE KIDNEY INJURY) (HCC): ICD-10-CM

## 2024-12-16 LAB
ANION GAP SERPL CALCULATED.3IONS-SCNC: 13 MMOL/L (ref 9–16)
BUN SERPL-MCNC: 15 MG/DL (ref 8–23)
CALCIUM SERPL-MCNC: 9.3 MG/DL (ref 8.6–10.4)
CHLORIDE SERPL-SCNC: 101 MMOL/L (ref 98–107)
CO2 SERPL-SCNC: 30 MMOL/L (ref 20–31)
CREAT SERPL-MCNC: 1 MG/DL (ref 0.7–1.2)
CREAT UR-MCNC: 112 MG/DL (ref 39–259)
GFR, ESTIMATED: 80 ML/MIN/1.73M2
GLUCOSE SERPL-MCNC: 106 MG/DL (ref 74–99)
MAGNESIUM SERPL-MCNC: 2 MG/DL (ref 1.6–2.4)
POTASSIUM SERPL-SCNC: 3.7 MMOL/L (ref 3.7–5.3)
SODIUM SERPL-SCNC: 144 MMOL/L (ref 136–145)
TOTAL PROTEIN, URINE: 11 MG/DL
URINE TOTAL PROTEIN CREATININE RATIO: 0.1 (ref 0–0.2)

## 2025-01-27 SDOH — ECONOMIC STABILITY: TRANSPORTATION INSECURITY
IN THE PAST 12 MONTHS, HAS THE LACK OF TRANSPORTATION KEPT YOU FROM MEDICAL APPOINTMENTS OR FROM GETTING MEDICATIONS?: NO

## 2025-01-27 SDOH — ECONOMIC STABILITY: FOOD INSECURITY: WITHIN THE PAST 12 MONTHS, THE FOOD YOU BOUGHT JUST DIDN'T LAST AND YOU DIDN'T HAVE MONEY TO GET MORE.: NEVER TRUE

## 2025-01-27 SDOH — ECONOMIC STABILITY: FOOD INSECURITY: WITHIN THE PAST 12 MONTHS, YOU WORRIED THAT YOUR FOOD WOULD RUN OUT BEFORE YOU GOT MONEY TO BUY MORE.: NEVER TRUE

## 2025-01-27 SDOH — ECONOMIC STABILITY: INCOME INSECURITY: IN THE LAST 12 MONTHS, WAS THERE A TIME WHEN YOU WERE NOT ABLE TO PAY THE MORTGAGE OR RENT ON TIME?: NO

## 2025-01-27 SDOH — HEALTH STABILITY: PHYSICAL HEALTH: ON AVERAGE, HOW MANY DAYS PER WEEK DO YOU ENGAGE IN MODERATE TO STRENUOUS EXERCISE (LIKE A BRISK WALK)?: 7 DAYS

## 2025-01-27 SDOH — HEALTH STABILITY: PHYSICAL HEALTH: ON AVERAGE, HOW MANY MINUTES DO YOU ENGAGE IN EXERCISE AT THIS LEVEL?: 30 MIN

## 2025-01-27 ASSESSMENT — LIFESTYLE VARIABLES
HOW OFTEN DURING THE LAST YEAR HAVE YOU NEEDED AN ALCOHOLIC DRINK FIRST THING IN THE MORNING TO GET YOURSELF GOING AFTER A NIGHT OF HEAVY DRINKING: NEVER
HOW OFTEN DURING THE LAST YEAR HAVE YOU HAD A FEELING OF GUILT OR REMORSE AFTER DRINKING: NEVER
HOW OFTEN DO YOU HAVE A DRINK CONTAINING ALCOHOL: 4 OR MORE TIMES A WEEK
HOW OFTEN DURING THE LAST YEAR HAVE YOU FOUND THAT YOU WERE NOT ABLE TO STOP DRINKING ONCE YOU HAD STARTED: NEVER
HOW OFTEN DURING THE LAST YEAR HAVE YOU FAILED TO DO WHAT WAS NORMALLY EXPECTED FROM YOU BECAUSE OF DRINKING: NEVER
HOW MANY STANDARD DRINKS CONTAINING ALCOHOL DO YOU HAVE ON A TYPICAL DAY: 3 OR 4
HAVE YOU OR SOMEONE ELSE BEEN INJURED AS A RESULT OF YOUR DRINKING: NO
HOW OFTEN DURING THE LAST YEAR HAVE YOU BEEN UNABLE TO REMEMBER WHAT HAPPENED THE NIGHT BEFORE BECAUSE YOU HAD BEEN DRINKING: NEVER
HAS A RELATIVE, FRIEND, DOCTOR, OR ANOTHER HEALTH PROFESSIONAL EXPRESSED CONCERN ABOUT YOUR DRINKING OR SUGGESTED YOU CUT DOWN: NO
HOW OFTEN DURING THE LAST YEAR HAVE YOU FOUND THAT YOU WERE NOT ABLE TO STOP DRINKING ONCE YOU HAD STARTED: NEVER
HOW OFTEN DO YOU HAVE A DRINK CONTAINING ALCOHOL: 5
HOW OFTEN DURING THE LAST YEAR HAVE YOU FAILED TO DO WHAT WAS NORMALLY EXPECTED FROM YOU BECAUSE OF DRINKING: NEVER
HOW OFTEN DURING THE LAST YEAR HAVE YOU HAD A FEELING OF GUILT OR REMORSE AFTER DRINKING: NEVER
HAS A RELATIVE, FRIEND, DOCTOR, OR ANOTHER HEALTH PROFESSIONAL EXPRESSED CONCERN ABOUT YOUR DRINKING OR SUGGESTED YOU CUT DOWN: NO
HOW MANY STANDARD DRINKS CONTAINING ALCOHOL DO YOU HAVE ON A TYPICAL DAY: 2
HOW OFTEN DO YOU HAVE SIX OR MORE DRINKS ON ONE OCCASION: 2
HOW OFTEN DURING THE LAST YEAR HAVE YOU BEEN UNABLE TO REMEMBER WHAT HAPPENED THE NIGHT BEFORE BECAUSE YOU HAD BEEN DRINKING: NEVER
HOW OFTEN DURING THE LAST YEAR HAVE YOU NEEDED AN ALCOHOLIC DRINK FIRST THING IN THE MORNING TO GET YOURSELF GOING AFTER A NIGHT OF HEAVY DRINKING: NEVER
HAVE YOU OR SOMEONE ELSE BEEN INJURED AS A RESULT OF YOUR DRINKING: NO

## 2025-01-27 ASSESSMENT — PATIENT HEALTH QUESTIONNAIRE - PHQ9
1. LITTLE INTEREST OR PLEASURE IN DOING THINGS: NOT AT ALL
SUM OF ALL RESPONSES TO PHQ QUESTIONS 1-9: 0
SUM OF ALL RESPONSES TO PHQ QUESTIONS 1-9: 0
2. FEELING DOWN, DEPRESSED OR HOPELESS: NOT AT ALL
SUM OF ALL RESPONSES TO PHQ QUESTIONS 1-9: 0
SUM OF ALL RESPONSES TO PHQ QUESTIONS 1-9: 0
SUM OF ALL RESPONSES TO PHQ9 QUESTIONS 1 & 2: 0

## 2025-01-29 ENCOUNTER — OFFICE VISIT (OUTPATIENT)
Dept: PRIMARY CARE CLINIC | Age: 75
End: 2025-01-29

## 2025-01-29 VITALS
DIASTOLIC BLOOD PRESSURE: 68 MMHG | BODY MASS INDEX: 34.96 KG/M2 | WEIGHT: 236 LBS | HEART RATE: 83 BPM | OXYGEN SATURATION: 96 % | SYSTOLIC BLOOD PRESSURE: 124 MMHG | HEIGHT: 69 IN

## 2025-01-29 DIAGNOSIS — G99.2 STENOSIS OF CERVICAL SPINE WITH MYELOPATHY (HCC): ICD-10-CM

## 2025-01-29 DIAGNOSIS — I10 PRIMARY HYPERTENSION: ICD-10-CM

## 2025-01-29 DIAGNOSIS — Z00.00 MEDICARE ANNUAL WELLNESS VISIT, SUBSEQUENT: Primary | ICD-10-CM

## 2025-01-29 DIAGNOSIS — I50.32 CHRONIC DIASTOLIC (CONGESTIVE) HEART FAILURE (HCC): ICD-10-CM

## 2025-01-29 DIAGNOSIS — N18.31 STAGE 3A CHRONIC KIDNEY DISEASE (HCC): ICD-10-CM

## 2025-01-29 DIAGNOSIS — M48.02 STENOSIS OF CERVICAL SPINE WITH MYELOPATHY (HCC): ICD-10-CM

## 2025-01-29 DIAGNOSIS — R73.01 IMPAIRED FASTING BLOOD SUGAR: ICD-10-CM

## 2025-01-29 NOTE — PATIENT INSTRUCTIONS
9 Ways to Cut Back on Drinking  Maybe you've found yourself drinking more alcohol than you'd prefer. If you want to cut back, here are some ideas to try.    Think before you drink.  Do you really want a drink, or is it just a habit? If you're used to having a drink at a certain time, try doing something else then.     Look for substitutes.  Find some no-alcohol drinks that you enjoy, like flavored seltzer water, tea with honey, or tonic with a slice of lime. Or try alcohol-free beer or \"virgin\" cocktails (without the alcohol).     Drink more water.  Use water to quench your thirst. Drink a glass of water before you have any alcohol. Have another glass along with every drink or between drinks.     Shrink your drink.  For example, have a bottle of beer instead of a pint. Use a smaller glass for wine. Choose drinks with lower alcohol content (ABV%). Or use less liquor and more mixer in cocktails.     Slow down.  It's easy to drink quickly and without thinking about it. Pay attention, and make each drink last longer.     Do the math.  Total up how much you spend on alcohol each month. How much is that a year? If you cut back, what could you do with the money you save?     Take a break.  Choose a day or two each week when you won't drink at all. Notice how you feel on those days, physically and emotionally. How did you sleep? Do you feel better? Over time, add more break days.     Count calories.  Would you like to lose some weight? For some people that's a good motivator for cutting back. Figure out how many calories are in each drink. How many does that add up to in a day? In a week? In a month?     Practice saying no.  Be ready when someone offers you a drink. Try: \"Thanks, I've had enough.\" Or \"Thanks, but I'm cutting back.\" Or \"No, thanks. I feel better when I drink less.\"   Current as of: November 15, 2023  Content Version: 14.3  © 2024 [employer].   Care instructions adapted under license by [facility]

## 2025-01-29 NOTE — PROGRESS NOTES
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   Stage 3a chronic kidney disease (HCC)- GFR improved.   Chronic diastolic (congestive) heart failure (HCC)- has been doing well. Breathing well and denies any complaints. Following with cardiology.        Return in about 1 year (around 2/2/2026) for awv.     Subjective       Patient's complete Health Risk Assessment and screening values have been reviewed and are found in Flowsheets. The following problems were reviewed today and where indicated follow up appointments were made and/or referrals ordered.    Had kyphoplasty and later on l3- l4 laminectomy- feeling well since then .   Kidney function improved    Has been trying to walk a little more, has been up to almost a mile.     Positive Risk Factor Screenings with Interventions:       Alcohol Screening:  AUDIT-C Score: 6  AUDIT Total Score: 6  Total Score Interpretation: 0-7 suggests low risk alcohol consumption but assess individual risks   Interventions:  Notes about 2-3 drinks at night- recommend cutting back  as well.                 Abnormal BMI (obese):  Body mass index is 34.85 kg/m². (!) Abnormal  Interventions:  Recommend healthy diet, discussed dietary changes.           Vision Screen:  Do you have difficulty driving, watching TV, or doing any of your daily activities because of your eyesight?: No  Have you had an eye exam within the past year?: (!) No  Interventions:   Due for eye exam    Safety:  Do you have non-slip mats or non-slip surfaces or shower bars or grab bars in your shower or bathtub?: (!) No  Interventions:  Has non-slip mat out of shower.

## 2025-03-07 ENCOUNTER — HOSPITAL ENCOUNTER (OUTPATIENT)
Age: 75
Setting detail: SPECIMEN
Discharge: HOME | End: 2025-03-07

## 2025-03-07 DIAGNOSIS — R73.01 IMPAIRED FASTING BLOOD SUGAR: ICD-10-CM

## 2025-03-07 DIAGNOSIS — I10 PRIMARY HYPERTENSION: ICD-10-CM

## 2025-03-07 LAB
ALBUMIN SERPL-MCNC: 4.2 G/DL (ref 3.5–5.2)
ALBUMIN/GLOB SERPL: 1.3 {RATIO} (ref 1–2.5)
ALP SERPL-CCNC: 89 U/L (ref 40–129)
ALT SERPL-CCNC: 23 U/L (ref 10–50)
AST SERPL-CCNC: 29 U/L (ref 10–50)
BILIRUB DIRECT SERPL-MCNC: 0.2 MG/DL (ref 0–0.2)
BILIRUB INDIRECT SERPL-MCNC: 0.3 MG/DL (ref 0–1)
BILIRUB SERPL-MCNC: 0.5 MG/DL (ref 0–1.2)
CHOLEST SERPL-MCNC: 181 MG/DL (ref 0–199)
CHOLESTEROL/HDL RATIO: 3.6
EST. AVERAGE GLUCOSE BLD GHB EST-MCNC: 100 MG/DL
GLOBULIN SER CALC-MCNC: 3.2 G/DL
HBA1C MFR BLD: 5.1 % (ref 4–6)
HDLC SERPL-MCNC: 50 MG/DL
LDLC SERPL CALC-MCNC: 103 MG/DL (ref 0–100)
PROT SERPL-MCNC: 7.4 G/DL (ref 6.6–8.7)
TRIGL SERPL-MCNC: 142 MG/DL
VLDLC SERPL CALC-MCNC: 28 MG/DL (ref 1–30)

## 2025-03-16 ENCOUNTER — RESULTS FOLLOW-UP (OUTPATIENT)
Dept: PRIMARY CARE CLINIC | Age: 75
End: 2025-03-16

## 2025-03-16 NOTE — RESULT ENCOUNTER NOTE
His cholesterol is not at goal, would recommend consider switching to rosuvastatin ( crestor) 20 mg which has better cholesterol reducing capability . If agreeable I can send it.

## 2025-03-17 DIAGNOSIS — I25.10 CORONARY ARTERY DISEASE INVOLVING NATIVE CORONARY ARTERY OF NATIVE HEART WITHOUT ANGINA PECTORIS: Primary | ICD-10-CM

## 2025-03-17 DIAGNOSIS — Z95.1 S/P CABG X 4: ICD-10-CM

## 2025-03-17 RX ORDER — ROSUVASTATIN CALCIUM 20 MG/1
20 TABLET, COATED ORAL DAILY
Qty: 90 TABLET | Refills: 1 | Status: SHIPPED | OUTPATIENT
Start: 2025-03-17

## 2025-03-31 ENCOUNTER — HOSPITAL ENCOUNTER (OUTPATIENT)
Age: 75
Setting detail: SPECIMEN
Discharge: HOME | End: 2025-03-31

## 2025-03-31 ENCOUNTER — OFFICE VISIT (OUTPATIENT)
Dept: FAMILY MEDICINE CLINIC | Age: 75
End: 2025-03-31

## 2025-03-31 VITALS
HEIGHT: 70 IN | BODY MASS INDEX: 33.79 KG/M2 | HEART RATE: 65 BPM | WEIGHT: 236 LBS | OXYGEN SATURATION: 95 % | SYSTOLIC BLOOD PRESSURE: 134 MMHG | DIASTOLIC BLOOD PRESSURE: 82 MMHG

## 2025-03-31 DIAGNOSIS — N30.01 ACUTE CYSTITIS WITH HEMATURIA: Primary | ICD-10-CM

## 2025-03-31 DIAGNOSIS — N30.01 ACUTE CYSTITIS WITH HEMATURIA: ICD-10-CM

## 2025-03-31 DIAGNOSIS — R30.0 DYSURIA: ICD-10-CM

## 2025-03-31 LAB
BILIRUBIN, POC: NORMAL
BLOOD URINE, POC: NORMAL
CLARITY, POC: NORMAL
COLOR, POC: YELLOW
GLUCOSE URINE, POC: NORMAL MG/DL
KETONES, POC: NORMAL MG/DL
LEUKOCYTE EST, POC: NORMAL
NITRITE, POC: NORMAL
PH, POC: 6
PROTEIN, POC: 100 MG/DL
SPECIFIC GRAVITY, POC: 1.02
UROBILINOGEN, POC: 0.2 MG/DL

## 2025-03-31 RX ORDER — SULFAMETHOXAZOLE AND TRIMETHOPRIM 800; 160 MG/1; MG/1
1 TABLET ORAL 2 TIMES DAILY
Qty: 14 TABLET | Refills: 0 | Status: SHIPPED | OUTPATIENT
Start: 2025-03-31 | End: 2025-04-07

## 2025-03-31 NOTE — PROGRESS NOTES
[facility] PHYSICIANS [facility] WALK-IN  [address]  [address]  [place] [address]  Dept: [phone]     Casper Cummins is a 74 y.o. male Established patient, who presents to the walk-in clinic today with conditions/complaints as noted below:    Chief Complaint   Patient presents with    Urinary Pain     X 2-3 weeks, no other symptoms          HPI:     HPI  Pt presented to the walk in clinic today with c/o dysuria. This is a new problem. The current episode started 2-3 weekss ago. Associated symptoms include: frequency, urgency .  Pertinent negatives include: No fever, chills, back pain, ,abdominal pain, n/v.  Pt has not tried anything for symptoms. No hx of recurrent UTI - has been many years since his last UTI. Does have hx of enlarged prostate but does not bother him. Sees urology.      Past Medical History:   Diagnosis Date    Arthritis     Coronary artery disease (CAD) excluded     CABG x 4 in 1993, 5 stents after between 2000 and 2005    Enlarged prostate     Hyperlipidemia     Hypertension     Obesity     Positive cardiac stress test 2021    SOB (shortness of breath)     Wears partial dentures     upper       Current Outpatient Medications   Medication Sig Dispense Refill    sulfamethoxazole-trimethoprim (BACTRIM DS) 800-160 MG per tablet Take 1 tablet by mouth 2 times daily for 7 days 14 tablet 0    rosuvastatin (CRESTOR) 20 MG tablet Take 1 tablet by mouth daily 90 tablet 1    bumetanide (BUMEX) 2 MG tablet Take 1 tablet by mouth daily 90 tablet 3    clopidogrel (PLAVIX) 75 MG tablet TAKE 1 TABLET BY MOUTH DAILY 90 tablet 0    midodrine (PROAMATINE) 5 MG tablet Take 1 tablet by mouth 3 times daily as needed (for SBP <90) 90 tablet 3    tamsulosin (FLOMAX) 0.4 MG capsule Take 1 capsule by mouth daily       No current facility-administered medications for this visit.       No Known Allergies    Review of Systems:     Review of Systems See

## 2025-04-01 ENCOUNTER — RESULTS FOLLOW-UP (OUTPATIENT)
Dept: FAMILY MEDICINE CLINIC | Age: 75
End: 2025-04-01

## 2025-04-01 LAB
MICROORGANISM SPEC CULT: NORMAL
SERVICE CMNT-IMP: NORMAL
SPECIMEN DESCRIPTION: NORMAL

## (undated) DEVICE — FORCEPS BX L240CM WRK CHN 2.8MM STD CAP W/ NDL MIC MESH

## (undated) DEVICE — FORCEPS BX L240CM JAW DIA22MM ORNG STD CAP W NDL RAD JAW 4

## (undated) DEVICE — TRAP SPEC POLYP REM STRNR CLN DSGN MAGNIFYING WIND DISP

## (undated) DEVICE — CLIP LIG L235CM RESOL 360 BX/20

## (undated) DEVICE — STAZ ENDO KIT: Brand: MEDLINE INDUSTRIES, INC.

## (undated) DEVICE — SNARE ENDOSCP L240CM LOOP W27MM SHTH DIA2.4MM WRK CHN 2.8MM

## (undated) DEVICE — SNARE ENDOSCP M L240CM LOOP W27MM SHTH DIA2.4MM OVL FLX

## (undated) DEVICE — SNARE POLYP SM W13MMXL240CM SHTH DIA2.4MM OVL FLX DISP